# Patient Record
Sex: FEMALE | Race: WHITE | Employment: FULL TIME | URBAN - NONMETROPOLITAN AREA
[De-identification: names, ages, dates, MRNs, and addresses within clinical notes are randomized per-mention and may not be internally consistent; named-entity substitution may affect disease eponyms.]

---

## 2016-12-01 LAB
BUN BLDV-MCNC: NORMAL MG/DL
CALCIUM SERPL-MCNC: NORMAL MG/DL
CHLORIDE BLD-SCNC: NORMAL MMOL/L
CHOLESTEROL, TOTAL: 270 MG/DL
CHOLESTEROL/HDL RATIO: 5.1
CO2: NORMAL MMOL/L
CREAT SERPL-MCNC: NORMAL MG/DL
GFR CALCULATED: NORMAL
GLUCOSE BLD-MCNC: 182 MG/DL
HDLC SERPL-MCNC: 53 MG/DL (ref 35–70)
LDL CHOLESTEROL CALCULATED: 165.2 MG/DL (ref 0–160)
POTASSIUM SERPL-SCNC: NORMAL MMOL/L
SODIUM BLD-SCNC: NORMAL MMOL/L
TRIGL SERPL-MCNC: 259 MG/DL
VLDLC SERPL CALC-MCNC: 52 MG/DL

## 2016-12-07 LAB
AVERAGE GLUCOSE: NORMAL
HBA1C MFR BLD: 7.2 %

## 2017-07-13 ENCOUNTER — OFFICE VISIT (OUTPATIENT)
Dept: FAMILY MEDICINE CLINIC | Age: 52
End: 2017-07-13
Payer: COMMERCIAL

## 2017-07-13 VITALS — WEIGHT: 275 LBS | HEART RATE: 68 BPM | SYSTOLIC BLOOD PRESSURE: 110 MMHG | DIASTOLIC BLOOD PRESSURE: 80 MMHG

## 2017-07-13 DIAGNOSIS — L03.011 PARONYCHIA, RIGHT: ICD-10-CM

## 2017-07-13 DIAGNOSIS — L60.0 INGROWN TOENAIL: Primary | ICD-10-CM

## 2017-07-13 PROCEDURE — 99213 OFFICE O/P EST LOW 20 MIN: CPT | Performed by: FAMILY MEDICINE

## 2017-07-13 RX ORDER — CEPHALEXIN 500 MG/1
1000 CAPSULE ORAL 2 TIMES DAILY
Qty: 28 CAPSULE | Refills: 0 | Status: SHIPPED | OUTPATIENT
Start: 2017-07-13 | End: 2018-02-28 | Stop reason: ALTCHOICE

## 2017-07-13 RX ORDER — LISINOPRIL 20 MG/1
20 TABLET ORAL DAILY
COMMUNITY
End: 2017-09-14 | Stop reason: SDUPTHER

## 2017-09-14 RX ORDER — LISINOPRIL 20 MG/1
20 TABLET ORAL DAILY
Qty: 30 TABLET | Refills: 5 | Status: SHIPPED | OUTPATIENT
Start: 2017-09-14 | End: 2018-03-23 | Stop reason: SDUPTHER

## 2018-02-27 VITALS
HEART RATE: 82 BPM | WEIGHT: 272 LBS | BODY MASS INDEX: 45.32 KG/M2 | SYSTOLIC BLOOD PRESSURE: 132 MMHG | HEIGHT: 65 IN | DIASTOLIC BLOOD PRESSURE: 84 MMHG

## 2018-02-27 DIAGNOSIS — N95.0 POSTMENOPAUSAL BLEEDING: ICD-10-CM

## 2018-02-27 PROBLEM — I10 HYPERTENSION: Status: ACTIVE | Noted: 2018-02-27

## 2018-02-27 PROBLEM — E78.5 HYPERLIPIDEMIA: Status: ACTIVE | Noted: 2018-02-27

## 2018-02-28 ENCOUNTER — OFFICE VISIT (OUTPATIENT)
Dept: FAMILY MEDICINE CLINIC | Age: 53
End: 2018-02-28
Payer: COMMERCIAL

## 2018-02-28 ENCOUNTER — TELEPHONE (OUTPATIENT)
Dept: FAMILY MEDICINE CLINIC | Age: 53
End: 2018-02-28

## 2018-02-28 VITALS
HEIGHT: 64 IN | HEART RATE: 82 BPM | BODY MASS INDEX: 44.05 KG/M2 | WEIGHT: 258 LBS | SYSTOLIC BLOOD PRESSURE: 132 MMHG | DIASTOLIC BLOOD PRESSURE: 82 MMHG

## 2018-02-28 DIAGNOSIS — I10 ESSENTIAL HYPERTENSION: ICD-10-CM

## 2018-02-28 DIAGNOSIS — E11.65 TYPE 2 DIABETES MELLITUS WITH HYPERGLYCEMIA, WITHOUT LONG-TERM CURRENT USE OF INSULIN (HCC): Primary | ICD-10-CM

## 2018-02-28 DIAGNOSIS — R73.01 IMPAIRED FASTING GLUCOSE: ICD-10-CM

## 2018-02-28 LAB — HBA1C MFR BLD: 13.5 %

## 2018-02-28 PROCEDURE — 99215 OFFICE O/P EST HI 40 MIN: CPT | Performed by: FAMILY MEDICINE

## 2018-02-28 PROCEDURE — 83036 HEMOGLOBIN GLYCOSYLATED A1C: CPT | Performed by: FAMILY MEDICINE

## 2018-02-28 RX ORDER — LANCETS 30 GAUGE
EACH MISCELLANEOUS
Qty: 100 EACH | Refills: 3 | Status: SHIPPED | OUTPATIENT
Start: 2018-02-28

## 2018-02-28 ASSESSMENT — ENCOUNTER SYMPTOMS
CONSTIPATION: 0
STRIDOR: 0
WHEEZING: 0
DIARRHEA: 0
COUGH: 0
NAUSEA: 0
ABDOMINAL PAIN: 0
SHORTNESS OF BREATH: 0
ABDOMINAL DISTENTION: 0

## 2018-02-28 ASSESSMENT — PATIENT HEALTH QUESTIONNAIRE - PHQ9
SUM OF ALL RESPONSES TO PHQ QUESTIONS 1-9: 0
SUM OF ALL RESPONSES TO PHQ9 QUESTIONS 1 & 2: 0
2. FEELING DOWN, DEPRESSED OR HOPELESS: 0
1. LITTLE INTEREST OR PLEASURE IN DOING THINGS: 0

## 2018-02-28 NOTE — PATIENT INSTRUCTIONS
Start with one tab daily and then increase to 2 daily of the metformin XR in 2 weeks. Patient Education        Learning About Diabetes Food Guidelines  Your Care Instructions    Meal planning is important to manage diabetes. It helps keep your blood sugar at a target level (which you set with your doctor). You don't have to eat special foods. You can eat what your family eats, including sweets once in a while. But you do have to pay attention to how often you eat and how much you eat of certain foods. You may want to work with a dietitian or a certified diabetes educator (CDE) to help you plan meals and snacks. A dietitian or CDE can also help you lose weight if that is one of your goals. What should you know about eating carbs? Managing the amount of carbohydrate (carbs) you eat is an important part of healthy meals when you have diabetes. Carbohydrate is found in many foods. · Learn which foods have carbs. And learn the amounts of carbs in different foods. ¨ Bread, cereal, pasta, and rice have about 15 grams of carbs in a serving. A serving is 1 slice of bread (1 ounce), ½ cup of cooked cereal, or 1/3 cup of cooked pasta or rice. ¨ Fruits have 15 grams of carbs in a serving. A serving is 1 small fresh fruit, such as an apple or orange; ½ of a banana; ½ cup of cooked or canned fruit; ½ cup of fruit juice; 1 cup of melon or raspberries; or 2 tablespoons of dried fruit. ¨ Milk and no-sugar-added yogurt have 15 grams of carbs in a serving. A serving is 1 cup of milk or 2/3 cup of no-sugar-added yogurt. ¨ Starchy vegetables have 15 grams of carbs in a serving. A serving is ½ cup of mashed potatoes or sweet potato; 1 cup winter squash; ½ of a small baked potato; ½ cup of cooked beans; or ½ cup cooked corn or green peas. · Learn how much carbs to eat each day and at each meal. A dietitian or CDE can teach you how to keep track of the amount of carbs you eat. This is called carbohydrate counting.   · If you are answer your questions about meal planning. This health professional can also help you reach a healthy weight if that is one of your goals. What plan is right for you? Your dietitian or diabetes educator may suggest that you start with the plate format or carbohydrate counting. The plate format  The plate format is a simple way to help you manage how you eat. You plan meals by learning how much space each food should take on a plate. Using the plate format helps you spread carbohydrate throughout the day. It can make it easier to keep your blood sugar level within your target range. It also helps you see if you're eating healthy portion sizes. To use the plate format, you put non-starchy vegetables on half your plate. Add meat or meat substitutes on one-quarter of the plate. Put a grain or starchy vegetable (such as brown rice or a potato) on the final quarter of the plate. You can add a small piece of fruit and some low-fat or fat-free milk or yogurt, depending on your carbohydrate goal for each meal.  Here are some tips for using the plate format:  · Make sure that you are not using an oversized plate. A 9-inch plate is best. Many restaurants use larger plates. · Get used to using the plate format at home. Then you can use it when you eat out. · Write down your questions about using the plate format. Talk to your doctor, a dietitian, or a diabetes educator about your concerns. Carbohydrate counting  With carbohydrate counting, you plan meals based on the amount of carbohydrate in each food. Carbohydrate raises blood sugar higher and more quickly than any other nutrient. It is found in desserts, breads and cereals, and fruit. It's also found in starchy vegetables such as potatoes and corn, grains such as rice and pasta, and milk and yogurt. Spreading carbohydrate throughout the day helps keep your blood sugar levels within your target range.   Your daily amount depends on several things, including your weight,

## 2018-02-28 NOTE — TELEPHONE ENCOUNTER
I gave her a paper prescription/ order in her hand to take to the pharmacy as I could not get this to electronically send as it is in as DME. If the pharmacy has a specific brand name her insurance will cover we can try again to electronically send in.

## 2018-02-28 NOTE — PROGRESS NOTES
[address] [address] [address]  Dept: [phone]  Dept Fax: [phone]    Jacinto Valero is a 47 y.o. female who presents today for her medical conditions/complaints as noted below. Jacinto Valero is c/o of Blurred Vision (soes wear glasses but the past few weeks have been more blurry) and Fatigue (worn out and tired, ongoing for about 2-3 weeks now)      HPI:     HPI  Has been basically feeling worn down and vision is getting worse. Not sure if needed a check up, has been a bit. Also really thirsty and urinating a lot. Is getting a night to urinate, only once. Gums have bleeding for the last few weeks when she brushes her teeth and had a good check up then also. Occasional hot sweats but not changed. Occasionally slightly dizzy. Does not last long, better if she sits down and gets something to eat. No palpitations. No chest pain, no SOB, no palpitations. Has had weight loss and has not been trying but does not weigh herself    [place] out a lot, does not usually cook. Does not drink pop or sugared beverages, admits eats little fruits and beverages and likes potatoes.     BP Readings from Last 3 Encounters:   02/28/18 132/82   01/05/17 132/84   07/13/17 110/80          (goal 120/80)    Wt Readings from Last 3 Encounters:   02/28/18 258 lb (117 kg)   01/05/17 272 lb (123.4 kg)   07/13/17 275 lb (124.7 kg)        Past Medical History:   Diagnosis Date    Hyperlipidemia     Hypertension       Past Surgical History:   Procedure Laterality Date    ANKLE SURGERY      scope    CERVICAL POLYP REMOVAL  01/25/2017    CHOLECYSTECTOMY      DILATION AND CURETTAGE OF UTERUS  01/2017    due to menorrhagia    TUBAL LIGATION         Family History   Problem Relation Age of Onset    Arthritis Mother     Cancer Mother      endometrial       Social History   Substance Use Topics    Smoking status: Never Smoker    Smokeless tobacco: Never Used   [facility]

## 2018-03-02 ENCOUNTER — TELEPHONE (OUTPATIENT)
Dept: FAMILY MEDICINE CLINIC | Age: 53
End: 2018-03-02

## 2018-03-05 ENCOUNTER — TELEPHONE (OUTPATIENT)
Dept: FAMILY MEDICINE CLINIC | Age: 53
End: 2018-03-05

## 2018-03-06 ENCOUNTER — TELEPHONE (OUTPATIENT)
Dept: FAMILY MEDICINE CLINIC | Age: 53
End: 2018-03-06

## 2018-03-06 NOTE — TELEPHONE ENCOUNTER
[address] Transitions Initial Follow Up Call    Call within 2 business days of discharge: Yes    Patient: Germaine Overton Patient : 1965   MRN: [id number]  Reason for Admission: elevated blood sugars Discharge Date:   RARS: Gerory No Data Recorded     Spoke with: Shlomo Ho, stated that she is not doing bad but the medication is upsetting her stomach (janumet), is aware of coupons for this ect.     Facility: [facility]    Non-face-to-face services provided:  Scheduled appointment with PCP-Dr. Octavio Mendez    Follow Up  Future Appointments  Date Time Provider Paulino Olivares   3/14/2018 3:00 PM Micky Tello MD [facility]   2018 3:15 PM Micky Tello MD [address], LPN

## 2018-03-09 ENCOUNTER — TELEPHONE (OUTPATIENT)
Dept: FAMILY MEDICINE CLINIC | Age: 53
End: 2018-03-09

## 2018-03-09 NOTE — TELEPHONE ENCOUNTER
What does not feeling well mean? Is she taking the janumet 2 pills daily? Make sure trying to stay active and trying to watch diet and has the dietician follow up schedled. THis will take time to improve also.

## 2018-03-09 NOTE — TELEPHONE ENCOUNTER
Called and stated that her Blood sugars ae still running in the 300's,still not feeling welll and was wondering if there was anything else she should be doing? ?

## 2018-03-14 ENCOUNTER — OFFICE VISIT (OUTPATIENT)
Dept: FAMILY MEDICINE CLINIC | Age: 53
End: 2018-03-14
Payer: COMMERCIAL

## 2018-03-14 VITALS
SYSTOLIC BLOOD PRESSURE: 132 MMHG | WEIGHT: 256 LBS | DIASTOLIC BLOOD PRESSURE: 82 MMHG | HEART RATE: 86 BPM | BODY MASS INDEX: 43.94 KG/M2

## 2018-03-14 DIAGNOSIS — R53.83 FATIGUE, UNSPECIFIED TYPE: ICD-10-CM

## 2018-03-14 DIAGNOSIS — E11.65 TYPE 2 DIABETES MELLITUS WITH HYPERGLYCEMIA, WITHOUT LONG-TERM CURRENT USE OF INSULIN (HCC): ICD-10-CM

## 2018-03-14 PROCEDURE — 99213 OFFICE O/P EST LOW 20 MIN: CPT | Performed by: FAMILY MEDICINE

## 2018-03-14 PROCEDURE — 1111F DSCHRG MED/CURRENT MED MERGE: CPT | Performed by: FAMILY MEDICINE

## 2018-03-14 RX ORDER — GLIMEPIRIDE 4 MG/1
4 TABLET ORAL EVERY MORNING
Qty: 30 TABLET | Refills: 3 | Status: SHIPPED | OUTPATIENT
Start: 2018-03-14 | End: 2018-07-23 | Stop reason: DRUGHIGH

## 2018-03-14 NOTE — PATIENT INSTRUCTIONS
first main meal of the day. Follow your doctor's instructions. Take glimepiride with a full glass of water. Your blood sugar will need to be checked often, and you may need other blood tests at your doctor's office. Low blood sugar (hypoglycemia) can happen to everyone who has diabetes. Symptoms include headache, hunger, sweating, pale skin, irritability, dizziness, feeling shaky, or trouble concentrating. Always keep a source of sugar with you in case you have low blood sugar. Sugar sources include fruit juice, hard candy, crackers, raisins, and non-diet soda. Be sure your family and close friends know how to help you in an emergency. If you have severe hypoglycemia and cannot eat or drink, use a glucagon injection. Your doctor can prescribe a glucagon emergency injection kit and tell you how to use it. Check your blood sugar carefully during times of stress, travel, illness, surgery or medical emergency, vigorous exercise, or if you drink alcohol or skip meals. These things can affect your glucose levels and your dose needs may also change. Do not change your medication dose or schedule without your doctor's advice. Glimepiride is only part of a treatment program that may also include diet, exercise, weight control, blood sugar testing, and special medical care. Follow your doctor's instructions very closely. Store at room temperature away from moisture and heat. What happens if I miss a dose? Take the missed dose as soon as you remember. Skip the missed dose if it is almost time for your next scheduled dose. Do not  take extra medicine to make up the missed dose. What happens if I overdose? Seek emergency medical attention or call the Poison Help line at 1-979.133.5771. A glimepiride overdose can cause life-threatening hypoglycemia.   Symptoms of severe hypoglycemia include extreme weakness, confusion, tremors, sweating, fast heart rate, trouble speaking, nausea, vomiting, rapid breathing, fainting, and seizure (convulsions). What should I avoid while taking glimepiride? If you also take colesevelam, avoid taking it within 4 hours after you take glimepiride. Avoid drinking alcohol. It lowers blood sugar and may interfere with your diabetes treatment. Avoid exposure to sunlight or tanning beds. Glimepiride can make you sunburn more easily. Wear protective clothing and use sunscreen (SPF 30 or higher) when you are outdoors. What are the possible side effects of glimepiride? Get emergency medical help if you have signs of an allergic reaction: hives; difficulty breathing; swelling of your face, lips, tongue, or throat. Call your doctor at once if you have:  · pale or yellowed skin, dark colored urine, fever, confusion or weakness; or  · severe skin reaction --fever, sore throat, swelling in your face or tongue, burning in your eyes, skin pain, followed by a red or purple skin rash that spreads (especially in the face or upper body) and causes blistering and peeling. Common side effects may include:  · headache;  · dizziness, weakness;  · nausea; or  · flu symptoms. This is not a complete list of side effects and others may occur. Call your doctor for medical advice about side effects. You may report side effects to FDA at 4-918-FDA-4589. What other drugs will affect glimepiride? You may be more likely to have hypoglycemia (low blood sugar) if you take glimepiride with other drugs that can lower blood sugar, such as:  · exenatide (Byetta);  · probenecid (Benemid);  · aspirin or other salicylates (including Pepto Bismol);  · a blood thinner (warfarin, Coumadin, Jantoven);  · sulfa drugs (Bactrim, SMZ-TMP, and others);  · a monoamine oxidase inhibitor (MAOI); or  · insulin or other oral diabetes medications. This list is not complete, and many other medicines can increase or decrease the effects of glimepiride on lowering your blood sugar.  This includes prescription and over-the-counter medicines, vitamins, and herbal products. Tell your doctor about all medicines you use, and those you start or stop using during your treatment with glimepiride. Not all possible interactions are listed in this medication guide. Where can I get more information? Your pharmacist can provide more information about glimepiride. Remember, keep this and all other medicines out of the reach of children, never share your medicines with others, and use this medication only for the indication prescribed. Every effort has been made to ensure that the information provided by [address] is accurate, up-to-date, and complete, but no guarantee is made to that effect. Drug information contained herein may be time sensitive. [facility] information has been compiled for use by healthcare practitioners and consumers in the [place] and therefore [facility] does not warrant that uses outside of the [place] are appropriate, unless specifically indicated otherwise. [facility]'s drug information does not endorse drugs, diagnose patients or recommend therapy. [facility] drug information is an informational resource designed to assist licensed healthcare practitioners in caring for their patients and/or to serve consumers viewing this service as a supplement to, and not a substitute for, the expertise, skill, knowledge and judgment of healthcare practitioners. The absence of a warning for a given drug or drug combination in no way should be construed to indicate that the drug or drug combination is safe, effective or appropriate for any given patient. [facility] does not assume any responsibility for any aspect of healthcare administered with the aid of information [facility] provides. The information contained herein is not intended to cover all possible uses, directions, precautions, warnings, drug interactions, allergic reactions, or adverse effects.  If you have questions about the drugs you are taking, check with your doctor, nurse or pharmacist.  Copyright 9803-1395 Christian Purdy. Version: 10.03. Revision date: 12/2/2015. Care instructions adapted under license by [facility] ([facility]). If you have questions about a medical condition or this instruction, always ask your healthcare professional. [address] any warranty or liability for your use of this information.

## 2018-03-14 NOTE — PROGRESS NOTES
lightheaded even before starting the medication     History of Present illness - Follow up of Hospital diagnosis(es):admitted to the hospital for    Non face to face  following discharge, date last encounter closed (first attempt may have been earlier): 3/6/2018  2:40 PM 3/6/2018  2:40 PM    Call initiated 2 business days of discharge: Yes     Interval history/Current status: Blood sugars have still been in the 200-low 300 range at times    Feels alittle \"queasy\" at times. Worse after she takes the pill. A weird feeling. No diarrhea. No gas. Feels like this is getting better than when she started it. Feels really full. Physical Exam:  Physical Exam   Constitutional: She is oriented to person, place, and time. She appears well-developed and well-nourished. HENT:   Head: Normocephalic and atraumatic. Eyes: Conjunctivae and EOM are normal.   Neck: Normal range of motion. Neck supple. No JVD present. No thyromegaly present. Cardiovascular: Normal rate, regular rhythm and intact distal pulses. Exam reveals no gallop and no friction rub. No murmur heard. Pulmonary/Chest: Effort normal and breath sounds normal. No respiratory distress. Abdominal: Soft. Musculoskeletal: She exhibits no edema. Lymphadenopathy:     She has no cervical adenopathy. Neurological: She is alert and oriented to person, place, and time. Skin: Skin is warm. Nursing note and vitals reviewed. Assessment/Plan:  Laine Ashley was seen today for follow-up from hospital.    Diagnoses and all orders for this visit:    Uncontrolled type 2 diabetes mellitus without complication, without long-term current use of insulin ([address]  -     SD DISCHARGE MEDS RECONCILED W/ CURRENT OUTPATIENT MED LIST  -     glimepiride (AMARYL) 4 MG tablet; Take 1 tablet by mouth every morning  -     Discontinue: SITagliptin-metFORMIN (JANUMET XR)  MG TB24 per extended release tablet;  Take 2 tablets by mouth daily  -     Microalbumin, Ur; Future  -     Lipid Panel; Future  Discussed continuing on the metformin and will let us know if she does not improve with the GI tolerance- believes it is improving at this point. Will continue with the diet and exercise as well. Type 2 diabetes mellitus with hyperglycemia, without long-term current use of insulin (HCC)    Fatigue, unspecified type  -     TSH without Reflex;  Future        Medical Decision Making: low complexity

## 2018-03-15 ENCOUNTER — TELEPHONE (OUTPATIENT)
Dept: FAMILY MEDICINE CLINIC | Age: 53
End: 2018-03-15

## 2018-03-15 DIAGNOSIS — E11.9 TYPE 2 DIABETES MELLITUS WITHOUT COMPLICATION, WITHOUT LONG-TERM CURRENT USE OF INSULIN (HCC): Primary | ICD-10-CM

## 2018-03-15 RX ORDER — METFORMIN HYDROCHLORIDE 500 MG/1
1000 TABLET, EXTENDED RELEASE ORAL
Qty: 60 TABLET | Refills: 3 | Status: SHIPPED | OUTPATIENT
Start: 2018-03-15 | End: 2018-07-14 | Stop reason: SDUPTHER

## 2018-03-15 NOTE — TELEPHONE ENCOUNTER
Stop the janumet. Start the Metformin ER which I sent to [facility]. Watch the diet closely. Will see how she is doing with the sugars at the next appt and go from there.

## 2018-03-23 RX ORDER — LISINOPRIL 20 MG/1
TABLET ORAL
Qty: 30 TABLET | Refills: 5 | Status: SHIPPED | OUTPATIENT
Start: 2018-03-23 | End: 2018-09-24 | Stop reason: SDUPTHER

## 2018-04-16 LAB
CHOLESTEROL/HDL RATIO: 4.9 RATIO
CHOLESTEROL: 249 MG/DL
CREATININE, RANDOM: 136.7 MG/DL
HDL, DIRECT: 51 MG/DL
LDL CHOLESTEROL CALCULATED: 164.4 MG/DL
MICROALBUMIN UR-MCNC: 1.1 MG/DL
MICROALBUMIN/CREAT UR-RTO: 8 MCG/MG CR
TRIGL SERPL-MCNC: 168 MG/DL
TSH SERPL DL<=0.05 MIU/L-ACNC: 1.79 MIU/ML
VLDLC SERPL CALC-MCNC: 34 MG/DL

## 2018-04-23 ENCOUNTER — OFFICE VISIT (OUTPATIENT)
Dept: FAMILY MEDICINE CLINIC | Age: 53
End: 2018-04-23
Payer: COMMERCIAL

## 2018-04-23 VITALS
DIASTOLIC BLOOD PRESSURE: 80 MMHG | SYSTOLIC BLOOD PRESSURE: 122 MMHG | HEART RATE: 64 BPM | WEIGHT: 248 LBS | BODY MASS INDEX: 42.57 KG/M2

## 2018-04-23 DIAGNOSIS — I10 ESSENTIAL HYPERTENSION: Primary | ICD-10-CM

## 2018-04-23 DIAGNOSIS — E78.2 MIXED HYPERLIPIDEMIA: ICD-10-CM

## 2018-04-23 PROCEDURE — 99214 OFFICE O/P EST MOD 30 MIN: CPT | Performed by: FAMILY MEDICINE

## 2018-04-24 ASSESSMENT — ENCOUNTER SYMPTOMS
BLURRED VISION: 1
VISUAL CHANGE: 0

## 2018-06-06 ENCOUNTER — OFFICE VISIT (OUTPATIENT)
Dept: FAMILY MEDICINE CLINIC | Age: 53
End: 2018-06-06
Payer: COMMERCIAL

## 2018-06-06 VITALS
DIASTOLIC BLOOD PRESSURE: 70 MMHG | HEART RATE: 72 BPM | BODY MASS INDEX: 42.4 KG/M2 | SYSTOLIC BLOOD PRESSURE: 110 MMHG | WEIGHT: 247 LBS

## 2018-06-06 DIAGNOSIS — G62.9 NEUROPATHY: Primary | ICD-10-CM

## 2018-06-06 PROCEDURE — 99213 OFFICE O/P EST LOW 20 MIN: CPT | Performed by: NURSE PRACTITIONER

## 2018-06-06 RX ORDER — GABAPENTIN 100 MG/1
100 CAPSULE ORAL 3 TIMES DAILY
Qty: 90 CAPSULE | Refills: 0 | Status: SHIPPED | OUTPATIENT
Start: 2018-06-06 | End: 2019-02-05 | Stop reason: ALTCHOICE

## 2018-07-14 DIAGNOSIS — E11.9 TYPE 2 DIABETES MELLITUS WITHOUT COMPLICATION, WITHOUT LONG-TERM CURRENT USE OF INSULIN (HCC): ICD-10-CM

## 2018-07-14 RX ORDER — METFORMIN HYDROCHLORIDE 500 MG/1
TABLET, EXTENDED RELEASE ORAL
Qty: 60 TABLET | Refills: 3 | Status: SHIPPED | OUTPATIENT
Start: 2018-07-14 | End: 2018-11-12 | Stop reason: SDUPTHER

## 2018-07-23 ENCOUNTER — OFFICE VISIT (OUTPATIENT)
Dept: FAMILY MEDICINE CLINIC | Age: 53
End: 2018-07-23
Payer: COMMERCIAL

## 2018-07-23 VITALS
DIASTOLIC BLOOD PRESSURE: 82 MMHG | SYSTOLIC BLOOD PRESSURE: 124 MMHG | OXYGEN SATURATION: 98 % | BODY MASS INDEX: 42.78 KG/M2 | HEART RATE: 107 BPM | WEIGHT: 249.25 LBS

## 2018-07-23 DIAGNOSIS — I10 ESSENTIAL HYPERTENSION: Primary | ICD-10-CM

## 2018-07-23 DIAGNOSIS — Z12.39 SCREENING BREAST EXAMINATION: ICD-10-CM

## 2018-07-23 DIAGNOSIS — E11.9 TYPE 2 DIABETES MELLITUS WITHOUT COMPLICATION, WITHOUT LONG-TERM CURRENT USE OF INSULIN (HCC): ICD-10-CM

## 2018-07-23 DIAGNOSIS — F41.9 ANXIETY: ICD-10-CM

## 2018-07-23 DIAGNOSIS — E78.2 MIXED HYPERLIPIDEMIA: ICD-10-CM

## 2018-07-23 LAB — HBA1C MFR BLD: 6.4 %

## 2018-07-23 PROCEDURE — 99214 OFFICE O/P EST MOD 30 MIN: CPT | Performed by: FAMILY MEDICINE

## 2018-07-23 PROCEDURE — 83036 HEMOGLOBIN GLYCOSYLATED A1C: CPT | Performed by: FAMILY MEDICINE

## 2018-07-23 RX ORDER — FLUOXETINE HYDROCHLORIDE 20 MG/1
20 CAPSULE ORAL DAILY
Qty: 30 CAPSULE | Refills: 3 | Status: SHIPPED | OUTPATIENT
Start: 2018-07-23 | End: 2018-11-12 | Stop reason: SDUPTHER

## 2018-07-23 RX ORDER — GLIMEPIRIDE 2 MG/1
2 TABLET ORAL
Qty: 30 TABLET | Refills: 5 | Status: SHIPPED | OUTPATIENT
Start: 2018-07-23 | End: 2018-10-19 | Stop reason: SDUPTHER

## 2018-07-23 ASSESSMENT — ENCOUNTER SYMPTOMS
SHORTNESS OF BREATH: 0
ABDOMINAL PAIN: 0
COUGH: 0
DIARRHEA: 0
CONSTIPATION: 0
WHEEZING: 0

## 2018-07-29 PROBLEM — E11.9 TYPE 2 DIABETES MELLITUS WITHOUT COMPLICATION, WITHOUT LONG-TERM CURRENT USE OF INSULIN (HCC): Status: ACTIVE | Noted: 2018-07-29

## 2018-09-24 RX ORDER — LISINOPRIL 20 MG/1
TABLET ORAL
Qty: 30 TABLET | Refills: 5 | Status: SHIPPED | OUTPATIENT
Start: 2018-09-24 | End: 2019-03-27 | Stop reason: SDUPTHER

## 2018-10-18 ENCOUNTER — TELEPHONE (OUTPATIENT)
Dept: FAMILY MEDICINE CLINIC | Age: 53
End: 2018-10-18

## 2018-10-19 DIAGNOSIS — E11.9 TYPE 2 DIABETES MELLITUS WITHOUT COMPLICATION, WITHOUT LONG-TERM CURRENT USE OF INSULIN (HCC): ICD-10-CM

## 2018-10-19 RX ORDER — GLIMEPIRIDE 2 MG/1
1 TABLET ORAL
Qty: 30 TABLET | Refills: 5
Start: 2018-10-19 | End: 2018-10-25 | Stop reason: ALTCHOICE

## 2018-10-25 ENCOUNTER — OFFICE VISIT (OUTPATIENT)
Dept: FAMILY MEDICINE CLINIC | Age: 53
End: 2018-10-25
Payer: COMMERCIAL

## 2018-10-25 VITALS
BODY MASS INDEX: 43.43 KG/M2 | OXYGEN SATURATION: 97 % | HEART RATE: 75 BPM | TEMPERATURE: 96.1 F | SYSTOLIC BLOOD PRESSURE: 110 MMHG | DIASTOLIC BLOOD PRESSURE: 78 MMHG | WEIGHT: 253 LBS

## 2018-10-25 DIAGNOSIS — E16.2 HYPOGLYCEMIA: ICD-10-CM

## 2018-10-25 DIAGNOSIS — Z23 NEED FOR PROPHYLACTIC VACCINATION AND INOCULATION AGAINST INFLUENZA: ICD-10-CM

## 2018-10-25 DIAGNOSIS — E11.9 TYPE 2 DIABETES MELLITUS WITHOUT COMPLICATION, WITHOUT LONG-TERM CURRENT USE OF INSULIN (HCC): Primary | ICD-10-CM

## 2018-10-25 LAB — HBA1C MFR BLD: 6.2 %

## 2018-10-25 PROCEDURE — 90686 IIV4 VACC NO PRSV 0.5 ML IM: CPT | Performed by: FAMILY MEDICINE

## 2018-10-25 PROCEDURE — 99213 OFFICE O/P EST LOW 20 MIN: CPT | Performed by: FAMILY MEDICINE

## 2018-10-25 PROCEDURE — 83036 HEMOGLOBIN GLYCOSYLATED A1C: CPT | Performed by: FAMILY MEDICINE

## 2018-10-25 PROCEDURE — 90471 IMMUNIZATION ADMIN: CPT | Performed by: FAMILY MEDICINE

## 2018-10-25 ASSESSMENT — ENCOUNTER SYMPTOMS
BLOOD IN STOOL: 0
DIARRHEA: 0
SHORTNESS OF BREATH: 0
CONSTIPATION: 0
ABDOMINAL PAIN: 0

## 2018-10-25 NOTE — PROGRESS NOTES
patientinstructions. Discussed use, benefit, and side effects of prescribed medications. All patient questions answered. Pt voiced understanding. Reviewed health maintenance. Instructed to continue current medications, diet andexercise. Patient agreed with treatment plan. Follow up as directed.      Electronically signed by Jarad Platt MD on 10/25/2018

## 2018-11-12 ENCOUNTER — OFFICE VISIT (OUTPATIENT)
Dept: FAMILY MEDICINE CLINIC | Age: 53
End: 2018-11-12
Payer: COMMERCIAL

## 2018-11-12 VITALS
DIASTOLIC BLOOD PRESSURE: 80 MMHG | WEIGHT: 254 LBS | HEART RATE: 84 BPM | SYSTOLIC BLOOD PRESSURE: 130 MMHG | BODY MASS INDEX: 43.6 KG/M2

## 2018-11-12 DIAGNOSIS — E11.9 TYPE 2 DIABETES MELLITUS WITHOUT COMPLICATION, WITHOUT LONG-TERM CURRENT USE OF INSULIN (HCC): ICD-10-CM

## 2018-11-12 DIAGNOSIS — Z11.59 NEED FOR HEPATITIS C SCREENING TEST: ICD-10-CM

## 2018-11-12 DIAGNOSIS — I10 ESSENTIAL HYPERTENSION: ICD-10-CM

## 2018-11-12 DIAGNOSIS — R41.840 LACK OF CONCENTRATION: ICD-10-CM

## 2018-11-12 DIAGNOSIS — Z12.11 SCREEN FOR COLON CANCER: ICD-10-CM

## 2018-11-12 DIAGNOSIS — F41.9 ANXIETY: ICD-10-CM

## 2018-11-12 DIAGNOSIS — Z23 NEED FOR PROPHYLACTIC VACCINATION AND INOCULATION AGAINST VARICELLA: ICD-10-CM

## 2018-11-12 DIAGNOSIS — Z00.00 WELL ADULT EXAM: Primary | ICD-10-CM

## 2018-11-12 DIAGNOSIS — Z12.31 ENCOUNTER FOR SCREENING MAMMOGRAM FOR BREAST CANCER: ICD-10-CM

## 2018-11-12 DIAGNOSIS — E78.2 MIXED HYPERLIPIDEMIA: ICD-10-CM

## 2018-11-12 DIAGNOSIS — Z23 NEED FOR PROPHYLACTIC VACCINATION AGAINST STREPTOCOCCUS PNEUMONIAE (PNEUMOCOCCUS): ICD-10-CM

## 2018-11-12 PROCEDURE — 90732 PPSV23 VACC 2 YRS+ SUBQ/IM: CPT | Performed by: FAMILY MEDICINE

## 2018-11-12 PROCEDURE — 99396 PREV VISIT EST AGE 40-64: CPT | Performed by: FAMILY MEDICINE

## 2018-11-12 PROCEDURE — 90471 IMMUNIZATION ADMIN: CPT | Performed by: FAMILY MEDICINE

## 2018-11-12 ASSESSMENT — ENCOUNTER SYMPTOMS
ABDOMINAL PAIN: 0
COLOR CHANGE: 0

## 2018-11-12 NOTE — PROGRESS NOTES
Review of Systems   Constitutional: Negative for fatigue. Gastrointestinal: Negative for abdominal pain. Genitourinary: Negative for frequency, vaginal bleeding and vaginal discharge. Skin: Negative for color change. Psychiatric/Behavioral: Negative for sleep disturbance.

## 2018-11-13 RX ORDER — FLUOXETINE HYDROCHLORIDE 20 MG/1
CAPSULE ORAL
Qty: 30 CAPSULE | Refills: 3 | Status: SHIPPED | OUTPATIENT
Start: 2018-11-13 | End: 2019-03-11 | Stop reason: SDUPTHER

## 2018-11-13 RX ORDER — METFORMIN HYDROCHLORIDE 500 MG/1
TABLET, EXTENDED RELEASE ORAL
Qty: 60 TABLET | Refills: 3 | Status: SHIPPED | OUTPATIENT
Start: 2018-11-13 | End: 2019-03-11 | Stop reason: SDUPTHER

## 2019-01-09 ENCOUNTER — TELEPHONE (OUTPATIENT)
Dept: FAMILY MEDICINE CLINIC | Age: 54
End: 2019-01-09

## 2019-01-09 DIAGNOSIS — E11.9 TYPE 2 DIABETES MELLITUS WITHOUT COMPLICATION, WITHOUT LONG-TERM CURRENT USE OF INSULIN (HCC): Primary | ICD-10-CM

## 2019-02-05 ENCOUNTER — OFFICE VISIT (OUTPATIENT)
Dept: FAMILY MEDICINE CLINIC | Age: 54
End: 2019-02-05
Payer: COMMERCIAL

## 2019-02-05 ENCOUNTER — EVALUATION (OUTPATIENT)
Dept: PHYSICAL THERAPY | Facility: CLINIC | Age: 54
End: 2019-02-05
Payer: COMMERCIAL

## 2019-02-05 VITALS
DIASTOLIC BLOOD PRESSURE: 64 MMHG | HEART RATE: 68 BPM | SYSTOLIC BLOOD PRESSURE: 122 MMHG | BODY MASS INDEX: 44.29 KG/M2 | WEIGHT: 258 LBS

## 2019-02-05 DIAGNOSIS — E66.01 CLASS 3 SEVERE OBESITY DUE TO EXCESS CALORIES WITH SERIOUS COMORBIDITY AND BODY MASS INDEX (BMI) OF 40.0 TO 44.9 IN ADULT (HCC): ICD-10-CM

## 2019-02-05 DIAGNOSIS — Z98.890 HISTORY OF ARTHROSCOPIC PROCEDURE ON SHOULDER: ICD-10-CM

## 2019-02-05 DIAGNOSIS — I10 ESSENTIAL HYPERTENSION: ICD-10-CM

## 2019-02-05 DIAGNOSIS — E11.9 TYPE 2 DIABETES MELLITUS WITHOUT COMPLICATION, WITHOUT LONG-TERM CURRENT USE OF INSULIN (HCC): Primary | ICD-10-CM

## 2019-02-05 DIAGNOSIS — E78.2 MIXED HYPERLIPIDEMIA: ICD-10-CM

## 2019-02-05 DIAGNOSIS — M12.812 ROTATOR CUFF ARTHROPATHY OF LEFT SHOULDER: ICD-10-CM

## 2019-02-05 DIAGNOSIS — Z71.89 DIABETES EDUCATION, ENCOUNTER FOR: ICD-10-CM

## 2019-02-05 DIAGNOSIS — M25.512 ACUTE PAIN OF LEFT SHOULDER: ICD-10-CM

## 2019-02-05 DIAGNOSIS — Z51.89 AFTERCARE: Primary | ICD-10-CM

## 2019-02-05 LAB — HBA1C MFR BLD: 7.4 %

## 2019-02-05 PROCEDURE — 97162 PT EVAL MOD COMPLEX 30 MIN: CPT | Performed by: PHYSICAL THERAPIST

## 2019-02-05 PROCEDURE — 95251 CONT GLUC MNTR ANALYSIS I&R: CPT | Performed by: NURSE PRACTITIONER

## 2019-02-05 PROCEDURE — 97140 MANUAL THERAPY 1/> REGIONS: CPT | Performed by: PHYSICAL THERAPIST

## 2019-02-05 PROCEDURE — 99205 OFFICE O/P NEW HI 60 MIN: CPT | Performed by: NURSE PRACTITIONER

## 2019-02-05 PROCEDURE — 83036 HEMOGLOBIN GLYCOSYLATED A1C: CPT | Performed by: NURSE PRACTITIONER

## 2019-02-05 ASSESSMENT — ENCOUNTER SYMPTOMS
BLURRED VISION: 0
ABDOMINAL PAIN: 0
RESPIRATORY NEGATIVE: 1
SHORTNESS OF BREATH: 0
VISUAL CHANGE: 0
DIARRHEA: 0

## 2019-02-05 NOTE — LETTER
2019    Jennie Patel, [address] [place] [address] Agustin C[place]eda [address]    Patient: Abhishek Diamond   YOB: 1965   Date of Visit: 2019     Encounter Diagnosis     ICD-10-CM    1  Aftercare Z51 89    2  Acute pain of left shoulder M25 512    3  History of arthroscopic procedure on shoulder Z98 890    4  Rotator cuff arthropathy of left shoulder M12 812        Dear Dr Meenakshi Way:    Please review the attached Plan of Care from [facility] recent visit  Please verify that you agree therapy should continue by signing the attached document and sending it back to our office  If you have any questions or concerns, please don't hesitate to call  Sincerely,    Sharonda Grajeda PT      Referring Provider:      I certify that I have read the below Plan of Care and certify the need for these services furnished under this plan of treatment while under my care  Jennie Patel MD  [address] [place] [address] Agustin Castañeda [address]: [phone]          PT Evaluation     Today's date: 2019  Patient name: Abhishek Diamond  : 1965  MRN: [id number]  Referring provider: Kyra Sterling MD  Dx:   Encounter Diagnosis     ICD-10-CM    1  Aftercare Z51 89    2  Acute pain of left shoulder M25 512    3  History of arthroscopic procedure on shoulder Z98 890    4  Rotator cuff arthropathy of left shoulder M12 812      Assessment  Assessment details: Left shoulder arthroscopic surgery rehab  Impairments: abnormal or restricted ROM, activity intolerance, impaired physical strength and pain with function  Understanding of Dx/Px/POC: excellent  Goals  STG 2-4 weeks:   Increase UE strength by 3-6 lbs  Decrease pain by 1-2 levels on 1-10 pain scale  Increase UE PROM by 10-15 Degrees in all planes  Reduce C/O pain with lying on either side  Initiate HEP  LTG 6-8 weeks:   Increase UE strength 10-20 lbs  Demonstrate UE AROM WFL in all planes     Decrease pain to <2  Improve strength by 10-20 lbs  Return to lying on their right or left side with minimal difficulty  Improve sleep status limitations to none  D/C with HEP  Plan  Patient would benefit from: skilled physical therapy  Planned modality interventions: ultrasound and unattended electrical stimulation  Planned therapy interventions: joint mobilization, manual therapy, patient education, self care, strengthening, stretching, therapeutic activities, therapeutic exercise, therapeutic training, flexibility and coordination  Frequency: 3x week  Duration in weeks: 6  Treatment plan discussed with: patient      Subjective Evaluation    Pain  Quality: discomfort, pulling, sharp, radiating, tight, throbbing and knife-like  Relieving factors: medications, ice, change in position, relaxation, rest and support  Aggravating factors: overhead activity, sitting, keyboarding and lifting  Progression: improved    Treatments  Current treatment: physical therapy  Patient Goals  Patient goals for therapy: decreased pain, increased motion, return to work, return to [employer] activities, independence with ADLs/IADLs and increased strength        Objective    Date of onset:  12/15/2018    Date of Surgery:  01/31/2019    History of Present Episode: 2/5/2019  Shana Joyner states she was pulling on the covers in bed and went to adjust the covers  She pulled hard and slipped  She started to roll out of bed unto the floor  She put her left hand out quickly and stopped herself from falling  She felt immediate pain in her left shoulder  She went for surgery the end of January  Past Medical History:    2/5/2019  Shana Joyner reports some allergies / general mold  Hypertension  Hypothyroidism  Previous Level of Functional Ability:  2/5/2019  Shana Joyner states no limitations or issues  No pain levels  Inspection / Palpation:  Shoulder:  2/5/2019  Mesomorphic body type  No signs of infection  No signs of wounds    No signs of drainage  No signs of ecchymotic regions  No signs of erythremic regions  Moderate signs of muscle spasm  Moderate signs of muscle guarding  Moderate signs of tenderness reported to palpation  Mild to moderate signs of swelling  Positive signs of a surgery site  Current conditions appears consistent with recent surgery on her left shoulder  Chief Complaints:  2/5/2019  Pedro Encarnacion reports severe difficulty with bending her left shoulder  Pedro Encarnacion reports severe difficulty with movement of her left shoulder  Pedro Encarnacion reports severe difficulty with use of her left arm  Pedro Encarnacion reports severe difficulty with lifting / elevating her left arm  Pedro Encarnacion reports moderate difficulty with sleeping  Pedro Encarnacion reports severe difficulty with her strength and endurance  Pedro Encarnacion reports severe limitations with her left shoulder range of motion  Pedro Encarnacion reports severe difficulty lying on her left shoulder region  Patient sleeps currently in a recliner      SHOULDER PAIN Resting Palpation Moving Lifting Elevating   2/5/2019 Rt 0 0 0 0 0   2/5/2019 Lt  3-5 3-5 8-10 8-10 10+     SHOULDER PAIN Sleeping Throwing Pushing Pulling Twisting   2/5/2019 Rt 0 0 0 0 0   2/5/2019 Lt  3-5 NA NA 8-10 8-10     SHOULDER AROM Flexion Extension Abduction   2/5/2019 Rt 180° 65° 180°   2/5/2019 Lt 25° 10° 5°     SHOULDER AROM Hor Add Ext Rotation Int Rotation   2/5/2019 Rt 70° 95° 95°   2/5/2019 Lt 5° 5° 82°     SHLD MMT Flexion Extension Abduction   2/5/2019 Rt 0/10  28 lbs 0/10  29 lbs 0/10  27 lbs   2/5/2019 Lt 8/10  1 lbs 8/10  2 lbs 8/10  1 lbs     SHLD MMT Hor Add Ext Rotation Int Rotation   2/5/2019 Rt 0/10  31 lbs 0/10  30 lbs 0/10  32 lbs   2/5/2019 Lt 8/10  1 lbs 8/10  1 lbs 8/10  1 lbs     Precautions: Left Shoulder SX    Daily Treatment Log    Manual  2/5       MT, ROM 15       HEP        Exercise Log 2/5       [facility]        FW-Laila's        FW-Curls,Tri        Power Web        Digiflex        Finger Ladder Mando-BP,PD,Lats,Row        Trimax-BP        W/P-PNF,IR,ER,PU,PS,Throw-Top,Mid,Bot        W/P-OH        ME, PE                Modalities 2/5       MH&ES        US

## 2019-02-05 NOTE — PROGRESS NOTES
PT Evaluation     Today's date: 2019  Patient name: Maria R Ardon  : 1965  MRN: [id number]  Referring provider: Kirsty Perez MD  Dx:   Encounter Diagnosis     ICD-10-CM    1  Aftercare Z51 89    2  Acute pain of left shoulder M25 512    3  History of arthroscopic procedure on shoulder Z98 890    4  Rotator cuff arthropathy of left shoulder M12 812      Assessment  Assessment details: Left shoulder arthroscopic surgery rehab  Impairments: abnormal or restricted ROM, activity intolerance, impaired physical strength and pain with function  Understanding of Dx/Px/POC: excellent  Goals  STG 2-4 weeks:   Increase UE strength by 3-6 lbs  Decrease pain by 1-2 levels on 1-10 pain scale  Increase UE PROM by 10-15 Degrees in all planes  Reduce C/O pain with lying on either side  Initiate HEP  LTG 6-8 weeks:   Increase UE strength 10-20 lbs  Demonstrate UE AROM WFL in all planes  Decrease pain to <2  Improve strength by 10-20 lbs  Return to lying on their right or left side with minimal difficulty  Improve sleep status limitations to none  D/C with HEP      Plan  Patient would benefit from: skilled physical therapy  Planned modality interventions: ultrasound and unattended electrical stimulation  Planned therapy interventions: joint mobilization, manual therapy, patient education, self care, strengthening, stretching, therapeutic activities, therapeutic exercise, therapeutic training, flexibility and coordination  Frequency: 3x week  Duration in weeks: 6  Treatment plan discussed with: patient      Subjective Evaluation    Pain  Quality: discomfort, pulling, sharp, radiating, tight, throbbing and knife-like  Relieving factors: medications, ice, change in position, relaxation, rest and support  Aggravating factors: overhead activity, sitting, keyboarding and lifting  Progression: improved    Treatments  Current treatment: physical therapy  Patient Goals  Patient goals for therapy: decreased pain, increased motion, return to work, return to [employer] activities, independence with ADLs/IADLs and increased strength        Objective    Date of onset:  12/15/2018    Date of Surgery:  01/31/2019    History of Present Episode: 2/5/2019  Belkys Webbing states she was pulling on the covers in bed and went to adjust the covers  She pulled hard and slipped  She started to roll out of bed unto the floor  She put her left hand out quickly and stopped herself from falling  She felt immediate pain in her left shoulder  She went for surgery the end of January  Past Medical History:    2/5/2019  Belkys Hinton reports some allergies / general mold  Hypertension  Hypothyroidism  Previous Level of Functional Ability:  2/5/2019  Belkys Hinton states no limitations or issues  No pain levels  Inspection / Palpation:  Shoulder:  2/5/2019  Mesomorphic body type  No signs of infection  No signs of wounds  No signs of drainage  No signs of ecchymotic regions  No signs of erythremic regions  Moderate signs of muscle spasm  Moderate signs of muscle guarding  Moderate signs of tenderness reported to palpation  Mild to moderate signs of swelling  Positive signs of a surgery site  Current conditions appears consistent with recent surgery on her left shoulder  Chief Complaints:  2/5/2019  Belkys Hinton reports severe difficulty with bending her left shoulder  Belkys Hinton reports severe difficulty with movement of her left shoulder  Belkys Hinton reports severe difficulty with use of her left arm  Belkys Hinton reports severe difficulty with lifting / elevating her left arm  Belkys Hinton reports moderate difficulty with sleeping  Belkys Hinton reports severe difficulty with her strength and endurance  Belkys Hinton reports severe limitations with her left shoulder range of motion  Belkys Hinton reports severe difficulty lying on her left shoulder region  Patient sleeps currently in a recliner      SHOULDER PAIN Resting Palpation Moving Lifting Elevating   2/5/2019 Rt 0 0 0 0 0   2/5/2019 Lt  3-5 3-5 8-10 8-10 10+     SHOULDER PAIN Sleeping Throwing Pushing Pulling Twisting   2/5/2019 Rt 0 0 0 0 0   2/5/2019 Lt  3-5 NA NA 8-10 8-10     SHOULDER AROM Flexion Extension Abduction   2/5/2019 Rt 180° 65° 180°   2/5/2019 Lt 25° 10° 5°     SHOULDER AROM Hor Add Ext Rotation Int Rotation   2/5/2019 Rt 70° 95° 95°   2/5/2019 Lt 5° 5° 82°     SHLD MMT Flexion Extension Abduction   2/5/2019 Rt 0/10  28 lbs 0/10  29 lbs 0/10  27 lbs   2/5/2019 Lt 8/10  1 lbs 8/10  2 lbs 8/10  1 lbs     SHLD MMT Hor Add Ext Rotation Int Rotation   2/5/2019 Rt 0/10  31 lbs 0/10  30 lbs 0/10  32 lbs   2/5/2019 Lt 8/10  1 lbs 8/10  1 lbs 8/10  1 lbs     Precautions: Left Shoulder SX    Daily Treatment Log    Manual  2/5       MT, ROM 15       HEP        Exercise Log 2/5       UBC        FWC-Codman's        FWC-Curls,Tri        Power Web        Digiflex        Finger Ladder        Mando-BP,PD,Lats,Row        Trimax-BP        W/P-PNF,IR,ER,PU,PS,Throw-Top,Mid,Bot        W/P-OH        ME, PE                Modalities 2/5       &ES        US

## 2019-02-06 ENCOUNTER — OFFICE VISIT (OUTPATIENT)
Dept: PHYSICAL THERAPY | Facility: CLINIC | Age: 54
End: 2019-02-06
Payer: COMMERCIAL

## 2019-02-06 DIAGNOSIS — M12.812 ROTATOR CUFF ARTHROPATHY OF LEFT SHOULDER: ICD-10-CM

## 2019-02-06 DIAGNOSIS — M25.512 ACUTE PAIN OF LEFT SHOULDER: ICD-10-CM

## 2019-02-06 DIAGNOSIS — Z98.890 HISTORY OF ARTHROSCOPIC PROCEDURE ON SHOULDER: ICD-10-CM

## 2019-02-06 DIAGNOSIS — Z51.89 AFTERCARE: Primary | ICD-10-CM

## 2019-02-06 PROCEDURE — 97014 ELECTRIC STIMULATION THERAPY: CPT | Performed by: PHYSICAL THERAPIST

## 2019-02-06 PROCEDURE — 97110 THERAPEUTIC EXERCISES: CPT | Performed by: PHYSICAL THERAPIST

## 2019-02-06 PROCEDURE — 97140 MANUAL THERAPY 1/> REGIONS: CPT | Performed by: PHYSICAL THERAPIST

## 2019-02-06 NOTE — PROGRESS NOTES
Today's date: 2019  Patient name: Maria R Ardon  : 1965  MRN: [id number]  Referring provider: Kirsty Perez MD  Dx:   Encounter Diagnosis     ICD-10-CM    1  Aftercare Z51 89    2  Acute pain of left shoulder M25 512    3  History of arthroscopic procedure on shoulder Z98 890    4  Rotator cuff arthropathy of left shoulder M12 812      Subjective:  Linda Jacobsen states her left shoulder feels sore today but no major increase in pain levels  She tolerate her care well today  Objective: See treatment log below    Assessment: Tolerated treatment well  Patient exhibited good technique with therapeutic exercises and would benefit from continued PT    Plan: Continue per plan of care  Progress treatment as tolerated         Precautions: Left Shoulder SX    Daily Treatment Log    Manual        MT, ROM 15' 15'      HEP        Exercise Log       UBC  10'-ROM      FWC-Codman's        FWC-Curls,Tri        Power Web        Digiflex        Finger Ladder        Mando-BP,PD,Lats,Row        Trimax-BP        W/P-PNF,IR,ER,PU,PS,Throw-Top,Mid,Bot        W/P-OH  5'      ME, PE  15'              Modalities       MH&ES        US        CP&ES  20'

## 2019-02-08 ENCOUNTER — OFFICE VISIT (OUTPATIENT)
Dept: PHYSICAL THERAPY | Facility: CLINIC | Age: 54
End: 2019-02-08
Payer: COMMERCIAL

## 2019-02-08 DIAGNOSIS — Z98.890 HISTORY OF ARTHROSCOPIC PROCEDURE ON SHOULDER: ICD-10-CM

## 2019-02-08 DIAGNOSIS — M12.812 ROTATOR CUFF ARTHROPATHY OF LEFT SHOULDER: ICD-10-CM

## 2019-02-08 DIAGNOSIS — M25.512 ACUTE PAIN OF LEFT SHOULDER: Primary | ICD-10-CM

## 2019-02-08 DIAGNOSIS — Z51.89 AFTERCARE: ICD-10-CM

## 2019-02-08 PROCEDURE — 97140 MANUAL THERAPY 1/> REGIONS: CPT | Performed by: PHYSICAL THERAPIST

## 2019-02-08 PROCEDURE — 97110 THERAPEUTIC EXERCISES: CPT | Performed by: PHYSICAL THERAPIST

## 2019-02-08 NOTE — PROGRESS NOTES
Today's date: 2019  Patient name: Nadir Pool  : 1965  MRN: [id number]  Referring provider: Errol Rowe MD  Dx:   Encounter Diagnosis     ICD-10-CM    1  Acute pain of left shoulder M25 512    2  History of arthroscopic procedure on shoulder Z98 890    3  Rotator cuff arthropathy of left shoulder M12 812    4  Aftercare Z51 89      Subjective:  Abbey Rivera states her left shoulder feels a little better  Objective: See treatment log below    Assessment: Tolerated treatment well  Patient exhibited good technique with therapeutic exercises and would benefit from continued PT    Plan: Continue per plan of care  Progress treatment as tolerated         Precautions: Left Shoulder SX    Daily Treatment Log    Manual       MT, ROM 15' 15' 15'     HEP        Exercise Log      UBC  10'-ROM 10'-ROM     FWC-Codman's        FWC-Curls,Tri        Power Web        Digiflex        Finger Ladder        Mando-BP,PD,Lats,Row        Trimax-BP        W/P-PNF,IR,ER,PU,PS,Throw-Top,Mid,Bot        W/P-OH  5' 5'     ME, PE  13' 15'             Modalities      MH&ES        US        CP&ES  20'

## 2019-02-11 ENCOUNTER — TRANSCRIBE ORDERS (OUTPATIENT)
Dept: PHYSICAL THERAPY | Facility: CLINIC | Age: 54
End: 2019-02-11

## 2019-02-11 ENCOUNTER — OFFICE VISIT (OUTPATIENT)
Dept: PHYSICAL THERAPY | Facility: CLINIC | Age: 54
End: 2019-02-11
Payer: COMMERCIAL

## 2019-02-11 DIAGNOSIS — M12.812 ROTATOR CUFF ARTHROPATHY OF LEFT SHOULDER: ICD-10-CM

## 2019-02-11 DIAGNOSIS — M25.512 ACUTE PAIN OF LEFT SHOULDER: ICD-10-CM

## 2019-02-11 DIAGNOSIS — M25.512 ACUTE PAIN OF LEFT SHOULDER: Primary | ICD-10-CM

## 2019-02-11 DIAGNOSIS — Z51.89 AFTERCARE: Primary | ICD-10-CM

## 2019-02-11 DIAGNOSIS — Z98.890 HISTORY OF ARTHROSCOPIC PROCEDURE ON SHOULDER: ICD-10-CM

## 2019-02-11 DIAGNOSIS — Z51.89 AFTERCARE: ICD-10-CM

## 2019-02-11 PROCEDURE — 97110 THERAPEUTIC EXERCISES: CPT | Performed by: PHYSICAL THERAPIST

## 2019-02-11 PROCEDURE — 97140 MANUAL THERAPY 1/> REGIONS: CPT | Performed by: PHYSICAL THERAPIST

## 2019-02-11 NOTE — PROGRESS NOTES
Today's date: 2019  Patient name: Chava Feng  : 1965  MRN: [id number]  Referring provider: Shantal Cheney MD  Dx:   Encounter Diagnosis     ICD-10-CM    1  Acute pain of left shoulder M25 512    2  History of arthroscopic procedure on shoulder Z98 890    3  Rotator cuff arthropathy of left shoulder M12 812    4  Aftercare Z51 89      Subjective:  Karyle Malta states her left shoulder is still sore but she is getting better  Objective: See treatment log below    Assessment: Tolerated treatment well  Patient exhibited good technique with therapeutic exercises and would benefit from continued PT    Plan: Continue per plan of care  Progress treatment as tolerated         Precautions: Left Shoulder SX    Daily Treatment Log    Manual      MT, ROM 15' 15' 15' 15'    HEP        Exercise Log     UBC  10'-ROM 10'-ROM 10'-ROM    FWC-Codman's        FWC-Curls,Tri        Power Web        Digiflex        Finger Ladder        Mando-BP,PD,Lats,Row        Trimax-BP        W/P-PNF,IR,ER,PU,PS,Throw-Top,Mid,Bot        W/P-OH  5' 5' 10'    ME, PE  13' 15' 15'            Modalities     MH&ES    20'            CP&ES  20'

## 2019-02-13 ENCOUNTER — APPOINTMENT (OUTPATIENT)
Dept: PHYSICAL THERAPY | Facility: CLINIC | Age: 54
End: 2019-02-13
Payer: COMMERCIAL

## 2019-02-14 ENCOUNTER — OFFICE VISIT (OUTPATIENT)
Dept: PHYSICAL THERAPY | Facility: CLINIC | Age: 54
End: 2019-02-14
Payer: COMMERCIAL

## 2019-02-14 DIAGNOSIS — Z51.89 AFTERCARE: ICD-10-CM

## 2019-02-14 DIAGNOSIS — Z98.890 HISTORY OF ARTHROSCOPIC PROCEDURE ON SHOULDER: ICD-10-CM

## 2019-02-14 DIAGNOSIS — M12.812 ROTATOR CUFF ARTHROPATHY OF LEFT SHOULDER: ICD-10-CM

## 2019-02-14 DIAGNOSIS — M25.512 ACUTE PAIN OF LEFT SHOULDER: Primary | ICD-10-CM

## 2019-02-14 PROCEDURE — 97014 ELECTRIC STIMULATION THERAPY: CPT | Performed by: PHYSICAL THERAPIST

## 2019-02-14 PROCEDURE — 97140 MANUAL THERAPY 1/> REGIONS: CPT | Performed by: PHYSICAL THERAPIST

## 2019-02-14 PROCEDURE — 97110 THERAPEUTIC EXERCISES: CPT | Performed by: PHYSICAL THERAPIST

## 2019-02-14 NOTE — PROGRESS NOTES
Today's date: 2019  Patient name: Ramila Morales  : 1965  MRN: [id number]  Referring provider: Laila Valencia MD  Dx:   Encounter Diagnosis     ICD-10-CM    1  Acute pain of left shoulder M25 512    2  History of arthroscopic procedure on shoulder Z98 890    3  Rotator cuff arthropathy of left shoulder M12 812    4  Aftercare Z51 89      Subjective:  Emilia Ramirez states her left shoulder is feeling better and better  She is improving with range of motion  Objective: See treatment log below    Assessment: Tolerated treatment well  Patient exhibited good technique with therapeutic exercises and would benefit from continued PT    Plan: Continue per plan of care  Progress treatment as tolerated         Precautions: Left Shoulder SX    Daily Treatment Log    Manual     MT, ROM 15' 15' 15' 15' 15'   HEP        Exercise Log    UBC  10'-ROM 10'-ROM 10'-ROM 10'-ROM   FWC-Codman's        FWC-Curls,Tri        Power Web        Digiflex        Finger Ladder        Mando-BP,PD,Lats,Row        Trimax-BP        W/P-PNF,IR,ER,PU,PS,Throw-Top,Mid,Bot        W/P-OH  5' 5' 10' 10'   ME, PE  15' 15' 15' 15'           Modalities    MH&ES    20' 20'   US        CP&ES  20'

## 2019-02-15 ENCOUNTER — OFFICE VISIT (OUTPATIENT)
Dept: PHYSICAL THERAPY | Facility: CLINIC | Age: 54
End: 2019-02-15
Payer: COMMERCIAL

## 2019-02-15 DIAGNOSIS — M12.812 ROTATOR CUFF ARTHROPATHY OF LEFT SHOULDER: ICD-10-CM

## 2019-02-15 DIAGNOSIS — Z51.89 AFTERCARE: ICD-10-CM

## 2019-02-15 DIAGNOSIS — Z98.890 HISTORY OF ARTHROSCOPIC PROCEDURE ON SHOULDER: Primary | ICD-10-CM

## 2019-02-15 DIAGNOSIS — M25.512 ACUTE PAIN OF LEFT SHOULDER: ICD-10-CM

## 2019-02-15 PROCEDURE — 97140 MANUAL THERAPY 1/> REGIONS: CPT | Performed by: PHYSICAL THERAPIST

## 2019-02-15 PROCEDURE — 97014 ELECTRIC STIMULATION THERAPY: CPT | Performed by: PHYSICAL THERAPIST

## 2019-02-15 PROCEDURE — 97110 THERAPEUTIC EXERCISES: CPT | Performed by: PHYSICAL THERAPIST

## 2019-02-15 NOTE — PROGRESS NOTES
Today's date: 2/15/2019  Patient name: Efrem Berger  : 1965  MRN: [id number]  Referring provider: Hang Holder MD  Dx:   Encounter Diagnosis     ICD-10-CM    1  History of arthroscopic procedure on shoulder Z98 890    2  Rotator cuff arthropathy of left shoulder M12 812    3  Aftercare Z51 89    4  Acute pain of left shoulder M25 512      Subjective:  Xavier Roque states her left shoulder is feeling better  Objective: See treatment log below    Assessment: Tolerated treatment well  Patient exhibited good technique with therapeutic exercises and would benefit from continued PT    Plan: Continue per plan of care  Progress treatment as tolerated         Precautions: Left Shoulder SX    Daily Treatment Log    Manual  2/15       MT, ROM 15'       HEP        Exercise Log 2/15       UBC 10"-ROM       FWC-Codman's 30x-No ABD       FWC-Curls,Tri        Power Web Yellow-30x       Digiflex Yellow-30x       Finger Ladder        Mando-BP,PD,Lats,Row        Trimax-BP        W/P-PNF,IR,ER,PU,PS,Throw-Top,Mid,Bot        W/P-OH 10'       ME, PE 15'               Modalities 2/15       MH&ES 20'       US        CP&ES

## 2019-02-18 ENCOUNTER — OFFICE VISIT (OUTPATIENT)
Dept: PHYSICAL THERAPY | Facility: CLINIC | Age: 54
End: 2019-02-18
Payer: COMMERCIAL

## 2019-02-18 DIAGNOSIS — M25.512 ACUTE PAIN OF LEFT SHOULDER: ICD-10-CM

## 2019-02-18 DIAGNOSIS — M12.812 ROTATOR CUFF ARTHROPATHY OF LEFT SHOULDER: ICD-10-CM

## 2019-02-18 DIAGNOSIS — Z51.89 AFTERCARE: ICD-10-CM

## 2019-02-18 DIAGNOSIS — Z98.890 HISTORY OF ARTHROSCOPIC PROCEDURE ON SHOULDER: Primary | ICD-10-CM

## 2019-02-18 PROCEDURE — 97140 MANUAL THERAPY 1/> REGIONS: CPT | Performed by: PHYSICAL THERAPIST

## 2019-02-18 PROCEDURE — 97110 THERAPEUTIC EXERCISES: CPT | Performed by: PHYSICAL THERAPIST

## 2019-02-18 PROCEDURE — 97014 ELECTRIC STIMULATION THERAPY: CPT | Performed by: PHYSICAL THERAPIST

## 2019-02-18 NOTE — PROGRESS NOTES
Today's date: 2019  Patient name: Chava Feng  : 1965  MRN: [id number]  Referring provider: Shantal Cheney MD  Dx:   Encounter Diagnosis     ICD-10-CM    1  History of arthroscopic procedure on shoulder Z98 890    2  Rotator cuff arthropathy of left shoulder M12 812    3  Aftercare Z51 89    4  Acute pain of left shoulder M25 512      Subjective:  Karyle Malta states her left shoiudler is feeling better but was sore over the week end  She feels OK today  Objective: See treatment log below    Assessment: Tolerated treatment well  Patient exhibited good technique with therapeutic exercises and would benefit from continued PT    Plan: Continue per plan of care  Progress treatment as tolerated         Precautions: Left Shoulder SX    Daily Treatment Log    Manual  2/15 2/18      MT, ROM 15' 15'      HEP        Exercise Log 2/15 2/18      UBC 10"-ROM 10'ROM      FWC-Codman's 30x-No ABD 30x      FWC-Curls,Tri        Power Web Yellow-30x       Digiflex Yellow-30x       Finger Ladder        Mando-BP,PD,Lats,Row        Trimax-BP        W/P-PNF,IR,ER,PU,PS,Throw-Top,Mid,Bot        W/P-OH 10' 10'      ME, PE 13' 15'              Modalities 2/15 2/18      MH&ES 20' 20'      US        CP&ES

## 2019-02-20 ENCOUNTER — OFFICE VISIT (OUTPATIENT)
Dept: PHYSICAL THERAPY | Facility: CLINIC | Age: 54
End: 2019-02-20
Payer: COMMERCIAL

## 2019-02-20 DIAGNOSIS — Z98.890 HISTORY OF ARTHROSCOPIC PROCEDURE ON SHOULDER: Primary | ICD-10-CM

## 2019-02-20 DIAGNOSIS — M12.812 ROTATOR CUFF ARTHROPATHY OF LEFT SHOULDER: ICD-10-CM

## 2019-02-20 DIAGNOSIS — Z51.89 AFTERCARE: ICD-10-CM

## 2019-02-20 DIAGNOSIS — M25.512 ACUTE PAIN OF LEFT SHOULDER: ICD-10-CM

## 2019-02-20 PROCEDURE — 97110 THERAPEUTIC EXERCISES: CPT | Performed by: PHYSICAL THERAPIST

## 2019-02-20 PROCEDURE — 97035 APP MDLTY 1+ULTRASOUND EA 15: CPT | Performed by: PHYSICAL THERAPIST

## 2019-02-20 PROCEDURE — 97140 MANUAL THERAPY 1/> REGIONS: CPT | Performed by: PHYSICAL THERAPIST

## 2019-02-20 PROCEDURE — 97014 ELECTRIC STIMULATION THERAPY: CPT | Performed by: PHYSICAL THERAPIST

## 2019-02-20 NOTE — PROGRESS NOTES
Today's date: 2019  Patient name: Ramlia Morales  : 1965  MRN: [id number]  Referring provider: Laila Valencia MD  Dx:   Encounter Diagnosis     ICD-10-CM    1  History of arthroscopic procedure on shoulder Z98 890    2  Rotator cuff arthropathy of left shoulder M12 812    3  Aftercare Z51 89    4  Acute pain of left shoulder M25 512      Subjective:  Emilia Ramirez states her left shoulder is slowly feeling better  Objective: See treatment log below    Assessment: Tolerated treatment well  Patient exhibited good technique with therapeutic exercises and would benefit from continued PT    Plan: Continue per plan of care  Progress treatment as tolerated         Precautions: Left Shoulder SX    Daily Treatment Log    Manual  2/15 2/18 2/20     MT, ROM 15' 15' 15'     HEP        Exercise Log 2/15 2/18 2/20     UBC 10"-ROM 10'-ROM 10'-ROM     FWC-Codman's 30x-No ABD 30x 30x     FWC-Curls,Tri        Power Web Yellow-30x  Yellow-30x     Digiflex Yellow-30x  Yellow-30x     Finger Ladder        Mando-BP,PD,Lats,Row        Trimax-BP        W/P-PNF,IR,ER,PU,PS,Throw-Top,Mid,Bot        W/P-OH 10' 10' 10'     ME, PE 15' 15' 15'             Modalities 2/15 2/18 2/20     MH&ES 20' 20' 20'     US   10'     CP&ES

## 2019-02-21 ENCOUNTER — OFFICE VISIT (OUTPATIENT)
Dept: PHYSICAL THERAPY | Facility: CLINIC | Age: 54
End: 2019-02-21
Payer: COMMERCIAL

## 2019-02-21 DIAGNOSIS — Z51.89 AFTERCARE: ICD-10-CM

## 2019-02-21 DIAGNOSIS — M25.512 ACUTE PAIN OF LEFT SHOULDER: ICD-10-CM

## 2019-02-21 DIAGNOSIS — Z98.890 HISTORY OF ARTHROSCOPIC PROCEDURE ON SHOULDER: Primary | ICD-10-CM

## 2019-02-21 DIAGNOSIS — M12.812 ROTATOR CUFF ARTHROPATHY OF LEFT SHOULDER: ICD-10-CM

## 2019-02-21 PROCEDURE — 97035 APP MDLTY 1+ULTRASOUND EA 15: CPT | Performed by: PHYSICAL THERAPIST

## 2019-02-21 PROCEDURE — 97014 ELECTRIC STIMULATION THERAPY: CPT | Performed by: PHYSICAL THERAPIST

## 2019-02-21 PROCEDURE — 97110 THERAPEUTIC EXERCISES: CPT | Performed by: PHYSICAL THERAPIST

## 2019-02-21 PROCEDURE — 97140 MANUAL THERAPY 1/> REGIONS: CPT | Performed by: PHYSICAL THERAPIST

## 2019-02-21 NOTE — PROGRESS NOTES
Today's date: 2019  Patient name: Tico Moyer  : 1965  MRN: [id number]  Referring provider: Yo Lemon MD  Dx:   Encounter Diagnosis     ICD-10-CM    1  History of arthroscopic procedure on shoulder Z98 890    2  Rotator cuff arthropathy of left shoulder M12 812    3  Aftercare Z51 89    4  Acute pain of left shoulder M25 512      Subjective:  Noah Camargo states her left shoulder is slowly feeling better  Patient did state that if she moves her left shoulder "wrong" she can get a sharp pain with her left shoulder region  She is usually pain free  Patient still cannot sleep in her own bed and uses a recliner  She tried the bed three days ago and that lasted about two hours and than moved to the recliner  Objective: See treatment log below    Assessment: Tolerated treatment well  Patient exhibited good technique with therapeutic exercises and would benefit from continued PT    Plan: Continue per plan of care  Progress treatment as tolerated         Precautions: Left Shoulder SX    Daily Treatment Log    Manual  2/15 2/18 2/20 2/21    MT, ROM 15' 15' 15' 15'    HEP        Exercise Log 2/15 2/18 2/20 2/21    UBC 10"-ROM 10'-ROM 10'-ROM 10'-ROM    FWC-Codman's 30x-No ABD 30x 30x 30x    FWC-Curls,Tri        Power Web Yellow-30x  Yellow-30x Yellow-30x    Digiflex Yellow-30x  Yellow-30x Yellow-30x    Finger Ladder        Mando-BP,PD,Lats,Row        Trimax-BP        W/P-PNF,IR,ER,PU,PS,Throw-Top,Mid,Bot        W/P-OH 10' 10' 10' 10'    ME, PE 15' 15' 15' 15'            Modalities 2/15 2/18 2/20 2/21    MH&ES 20' 20' 20' '    US   10' 10'    CP&ES

## 2019-02-25 ENCOUNTER — APPOINTMENT (OUTPATIENT)
Dept: PHYSICAL THERAPY | Facility: CLINIC | Age: 54
End: 2019-02-25
Payer: COMMERCIAL

## 2019-02-26 ENCOUNTER — OFFICE VISIT (OUTPATIENT)
Dept: PHYSICAL THERAPY | Facility: CLINIC | Age: 54
End: 2019-02-26
Payer: COMMERCIAL

## 2019-02-26 DIAGNOSIS — Z51.89 AFTERCARE: Primary | ICD-10-CM

## 2019-02-26 DIAGNOSIS — M25.512 ACUTE PAIN OF LEFT SHOULDER: ICD-10-CM

## 2019-02-26 DIAGNOSIS — M12.812 ROTATOR CUFF ARTHROPATHY OF LEFT SHOULDER: ICD-10-CM

## 2019-02-26 DIAGNOSIS — Z98.890 HISTORY OF ARTHROSCOPIC PROCEDURE ON SHOULDER: ICD-10-CM

## 2019-02-26 PROCEDURE — 97110 THERAPEUTIC EXERCISES: CPT | Performed by: PHYSICAL THERAPIST

## 2019-02-26 PROCEDURE — 97035 APP MDLTY 1+ULTRASOUND EA 15: CPT | Performed by: PHYSICAL THERAPIST

## 2019-02-26 PROCEDURE — 97014 ELECTRIC STIMULATION THERAPY: CPT | Performed by: PHYSICAL THERAPIST

## 2019-02-26 PROCEDURE — 97140 MANUAL THERAPY 1/> REGIONS: CPT | Performed by: PHYSICAL THERAPIST

## 2019-02-26 NOTE — PROGRESS NOTES
Today's date: 2019  Patient name: Chava Feng  : 1965  MRN: [id number]  Referring provider: Shantal Cheney MD  Dx:   Encounter Diagnosis     ICD-10-CM    1  Aftercare Z51 89    2  Acute pain of left shoulder M25 512    3  Rotator cuff arthropathy of left shoulder M12 812    4  History of arthroscopic procedure on shoulder Z98 890      Subjective:  Karyle Malta states her left shoulder is feeling OK  She saw the doctor and is not allowed to abduct her left shoulder  Gentle ROM is OK but no active Abduction  Objective: See treatment log below    Assessment: Tolerated treatment well  Patient exhibited good technique with therapeutic exercises and would benefit from continued PT    Plan: Continue per plan of care  Progress treatment as tolerated         Precautions: Left Shoulder SX    Daily Treatment Log    Manual  2/15 2/18 2/20 2/21 2/26   MT, ROM 15' 15' 15' 15' 15'   HEP        Exercise Log 2/15 2/18 2/20 2/21 2/26   UBC 10"-ROM 10'-ROM 10'-ROM 10'-ROM 10'-ROM   FWC-Codman's 30x-No ABD 0#-30x 0#-30x 0#-30x 0#-30x   FWC-Curls,Tri        Power Web Yellow-30x  Yellow-30x Yellow-30x Yellow-30x   Digiflex Yellow-30x  Yellow-30x Yellow-30x Yellow-30x   Finger Ladder        Mando-BP,PD,Lats,Row        Trimax-BP        W/P-PNF,IR,ER,PU,PS,Throw-Top,Mid,Bot        W/P-OH 10' 10' 10' 10' 10'   ME, PE 15' 15' 15' 15' 15'           Modalities 2/15 2/18 2/20 2/21 2/26   MH&ES 20' 20' 20' 20' 20'   US    10' 10'   CP&ES

## 2019-02-27 ENCOUNTER — APPOINTMENT (OUTPATIENT)
Dept: PHYSICAL THERAPY | Facility: CLINIC | Age: 54
End: 2019-02-27
Payer: COMMERCIAL

## 2019-02-28 ENCOUNTER — OFFICE VISIT (OUTPATIENT)
Dept: PHYSICAL THERAPY | Facility: CLINIC | Age: 54
End: 2019-02-28
Payer: COMMERCIAL

## 2019-02-28 DIAGNOSIS — M12.812 ROTATOR CUFF ARTHROPATHY OF LEFT SHOULDER: ICD-10-CM

## 2019-02-28 DIAGNOSIS — M25.512 ACUTE PAIN OF LEFT SHOULDER: ICD-10-CM

## 2019-02-28 DIAGNOSIS — Z51.89 AFTERCARE: Primary | ICD-10-CM

## 2019-02-28 DIAGNOSIS — Z98.890 HISTORY OF ARTHROSCOPIC PROCEDURE ON SHOULDER: ICD-10-CM

## 2019-02-28 PROCEDURE — 97110 THERAPEUTIC EXERCISES: CPT | Performed by: PHYSICAL THERAPIST

## 2019-02-28 PROCEDURE — 97014 ELECTRIC STIMULATION THERAPY: CPT | Performed by: PHYSICAL THERAPIST

## 2019-02-28 PROCEDURE — 97140 MANUAL THERAPY 1/> REGIONS: CPT | Performed by: PHYSICAL THERAPIST

## 2019-02-28 NOTE — PROGRESS NOTES
Today's date: 2019  Patient name: Chava Feng  : 1965  MRN: [id number]  Referring provider: Shantal Cheney MD  Dx:   Encounter Diagnosis     ICD-10-CM    1  Aftercare Z51 89    2  Acute pain of left shoulder M25 512    3  Rotator cuff arthropathy of left shoulder M12 812    4  History of arthroscopic procedure on shoulder Z98 890      Subjective:  Karyle Malta states her left shoulder feels a little better today  Objective: See treatment log below    Assessment: Tolerated treatment well  Patient exhibited good technique with therapeutic exercises and would benefit from continued PT    Plan: Continue per plan of care  Progress treatment as tolerated         Precautions: Left Shoulder SX    Daily Treatment Log    Manual     MT, ROM 15' 15' 15' 15' 15'   HEP        Exercise Log    UBC 10"-ROM 10'-ROM 10'-ROM 10'-ROM 10'-ROM   FWC-Codman's 30x-No ABD 0#-30x 0#-30x 0#-30x 0#-30x   FWC-Curls,Tri        Power Web Yellow-30x  Yellow-30x Yellow-30x Yellow-30x   Digiflex Yellow-30x  Yellow-30x Yellow-30x Yellow-30x   Finger Ladder        Mando-BP,PD,Lats,Row        Trimax-BP        W/P-PNF,IR,ER,PU,PS,Throw-Top,Mid,Bot        W/P-OH 10' 10' 10' 10' 10'   ME, PE 15' 15' 15' 15' 15'           Modalities    MH&ES 20' 20' 20' 20' 20'   US    10' 10'   CP&ES

## 2019-03-04 ENCOUNTER — APPOINTMENT (OUTPATIENT)
Dept: PHYSICAL THERAPY | Facility: CLINIC | Age: 54
End: 2019-03-04
Payer: COMMERCIAL

## 2019-03-05 ENCOUNTER — TRANSCRIBE ORDERS (OUTPATIENT)
Dept: PHYSICAL THERAPY | Facility: CLINIC | Age: 54
End: 2019-03-05

## 2019-03-05 ENCOUNTER — OFFICE VISIT (OUTPATIENT)
Dept: FAMILY MEDICINE CLINIC | Age: 54
End: 2019-03-05
Payer: COMMERCIAL

## 2019-03-05 ENCOUNTER — OFFICE VISIT (OUTPATIENT)
Dept: PHYSICAL THERAPY | Facility: CLINIC | Age: 54
End: 2019-03-05
Payer: COMMERCIAL

## 2019-03-05 VITALS
BODY MASS INDEX: 41.83 KG/M2 | WEIGHT: 243.7 LBS | DIASTOLIC BLOOD PRESSURE: 86 MMHG | SYSTOLIC BLOOD PRESSURE: 130 MMHG | RESPIRATION RATE: 16 BRPM | HEART RATE: 82 BPM

## 2019-03-05 DIAGNOSIS — M12.812 ROTATOR CUFF ARTHROPATHY OF LEFT SHOULDER: ICD-10-CM

## 2019-03-05 DIAGNOSIS — Z51.89 AFTERCARE: Primary | ICD-10-CM

## 2019-03-05 DIAGNOSIS — Z71.89 DIABETES EDUCATION, ENCOUNTER FOR: ICD-10-CM

## 2019-03-05 DIAGNOSIS — Z98.890 HISTORY OF ARTHROSCOPIC PROCEDURE ON SHOULDER: ICD-10-CM

## 2019-03-05 DIAGNOSIS — I10 ESSENTIAL HYPERTENSION: ICD-10-CM

## 2019-03-05 DIAGNOSIS — M25.512 ACUTE PAIN OF LEFT SHOULDER: ICD-10-CM

## 2019-03-05 DIAGNOSIS — E78.2 MIXED HYPERLIPIDEMIA: ICD-10-CM

## 2019-03-05 DIAGNOSIS — E11.9 TYPE 2 DIABETES MELLITUS WITHOUT COMPLICATION, WITHOUT LONG-TERM CURRENT USE OF INSULIN (HCC): Primary | ICD-10-CM

## 2019-03-05 PROCEDURE — 99214 OFFICE O/P EST MOD 30 MIN: CPT | Performed by: NURSE PRACTITIONER

## 2019-03-05 PROCEDURE — 97035 APP MDLTY 1+ULTRASOUND EA 15: CPT | Performed by: PHYSICAL THERAPIST

## 2019-03-05 PROCEDURE — 97014 ELECTRIC STIMULATION THERAPY: CPT | Performed by: PHYSICAL THERAPIST

## 2019-03-05 PROCEDURE — 97140 MANUAL THERAPY 1/> REGIONS: CPT | Performed by: PHYSICAL THERAPIST

## 2019-03-05 PROCEDURE — 97110 THERAPEUTIC EXERCISES: CPT | Performed by: PHYSICAL THERAPIST

## 2019-03-05 PROCEDURE — 97164 PT RE-EVAL EST PLAN CARE: CPT | Performed by: PHYSICAL THERAPIST

## 2019-03-05 PROCEDURE — 95251 CONT GLUC MNTR ANALYSIS I&R: CPT | Performed by: NURSE PRACTITIONER

## 2019-03-05 ASSESSMENT — ENCOUNTER SYMPTOMS
RESPIRATORY NEGATIVE: 1
SHORTNESS OF BREATH: 0
BLURRED VISION: 0
VISUAL CHANGE: 0
DIARRHEA: 0
ABDOMINAL PAIN: 0

## 2019-03-05 NOTE — PROGRESS NOTES
PT Re-Evaluation     Today's date: 3/5/2019  Patient name: Ramila Morales  : 1965  MRN: [id number]  Referring provider: Laila Valencia MD  Dx:   Encounter Diagnosis     ICD-10-CM    1  Aftercare Z51 89    2  Acute pain of left shoulder M25 512    3  Rotator cuff arthropathy of left shoulder M12 812    4  History of arthroscopic procedure on shoulder Z98 890      Assessment  Assessment details: Patient states she still feels weak  Patient states she still is not abducting or elevating her left shoulder  Impairments: abnormal or restricted ROM, abnormal movement, activity intolerance, impaired physical strength, pain with function, safety issue and scapular dyskinesis  Understanding of Dx/Px/POC: excellent  Goals  STG 2-4 weeks:   Increase UE strength by 3-6 lbs  Decrease pain by 1-2 levels on 1-10 pain scale  Increase UE PROM by 10-15 Degrees in all planes  Reduce C/O pain with lying on either side  Initiate HEP  LTG 6-8 weeks:   Increase UE strength 10-20 lbs  Demonstrate UE AROM WFL in all planes  Decrease pain to <2  Improve strength by 10-20 lbs  Return to lying on their right or left side with minimal difficulty  Improve sleep status limitations to none  D/C with HEP      Plan  Patient would benefit from: skilled physical therapy  Planned modality interventions: ultrasound and unattended electrical stimulation  Planned therapy interventions: joint mobilization, manual therapy, patient education, postural training, self care, strengthening, stretching, therapeutic activities, therapeutic exercise, flexibility, coordination and home exercise program  Frequency: 3x week  Duration in weeks: 6  Treatment plan discussed with: patient      Subjective Evaluation    Pain  Quality: discomfort, pulling, sharp, radiating, tight and squeezing  Relieving factors: heat, medications, change in position, ice, relaxation, rest and support  Aggravating factors: lifting and overhead activity  Progression: improved    Treatments  Current treatment: physical therapy  Patient Goals  Patient goals for therapy: decreased pain, increased motion, return to work, return to [employer] activities, independence with ADLs/IADLs and increased strength        Objective     Date of onset:  12/15/2018    Date of Surgery:  01/31/2019    History of Present Episode: 2/5/2019  Spencer states she was pulling on the covers in bed and went to adjust the covers  She pulled hard and slipped  She started to roll out of bed unto the floor  She put her left hand out quickly and stopped herself from falling  She felt immediate pain in her left shoulder  She went for surgery the end of January  Past Medical History:    2/5/2019  Spencer reports some allergies / general mold  Hypertension  Hypothyroidism  Previous Level of Functional Ability:  2/5/2019  Spencer states no limitations or issues  No pain levels  Inspection / Palpation:  Shoulder:  2/5/2019  Mesomorphic body type  No signs of infection  No signs of wounds  No signs of drainage  No signs of ecchymotic regions  No signs of erythremic regions  Moderate signs of muscle spasm  Moderate signs of muscle guarding  Moderate signs of tenderness reported to palpation  Mild to moderate signs of swelling  Positive signs of a surgery site  Current conditions appears consistent with recent surgery on her left shoulder  Chief Complaints:  2/5/2019  Spencer reports severe difficulty with bending her left shoulder  Spencer reports severe difficulty with movement of her left shoulder  Spencer reports severe difficulty with use of her left arm  Spencer reports severe difficulty with lifting / elevating her left arm  Spenecr reports moderate difficulty with sleeping  Spencer reports severe difficulty with her strength and endurance  Spencer reports severe limitations with her left shoulder range of motion    Spencer reports severe difficulty lying on her left shoulder region  Patient sleeps currently in a recliner      SHOULDER PAIN Resting Palpation Moving Lifting Elevating   2/5/2019 Rt 0 0 0 0 0   2/5/2019 Lt  3-5 3-5 8-10 8-10 10+   3/5/19 Lt 1-3 1-3 4-6 4-6 5-9     SHOULDER PAIN Sleeping Throwing Pushing Pulling Twisting   2/5/2019 Rt 0 0 0 0 0   2/5/2019 Lt  3-5 NA NA 8-10 8-10   3/5/19 Lt 1-3 NA 3-6 4-6 4-6     SHOULDER AROM Flexion Extension Abduction   2/5/2019 Rt 180° 65° 180°   2/5/2019 Lt 25° 10° 5°   3/5/19 Lt 125° 32° 35°     SHOULDER AROM Hor Add Ext Rotation Int Rotation   2/5/2019 Rt 70° 95° 95°   2/5/2019 Lt 5° 5° 82°   3/5/19 Lt 35° 26° 90°     SHLD MMT Flexion Extension Abduction   2/5/2019 Rt 0/10  28 lbs 0/10  29 lbs 0/10  27 lbs   2/5/2019 Lt 8/10  1 lbs 8/10  2 lbs 8/10  1 lbs   NT   3/5/19 Lt 4/10  6 lbs 4/10  7 lbs 8/10  1 lbs  NT     SHLD MMT Hor Add Ext Rotation Int Rotation   2/5/2019 Rt 0/10  31 lbs 0/10  30 lbs 0/10  32 lbs   2/5/2019 Lt 8/10  1 lbs 8/10  1 lbs  NT 8/10  1 lbs   3/5/19 Lt 4/10  6 lbs 4/10  1 lbs  NT 4/10  6 lbs     Precautions: Left Shoulder SX

## 2019-03-05 NOTE — LETTER
2019    Rowan Pineda, [address] [address] [place] [address]    Patient: Alicia Camara   YOB: 1965   Date of Visit: 3/5/2019     Encounter Diagnosis     ICD-10-CM    1  Aftercare Z51 89    2  Acute pain of left shoulder M25 512    3  Rotator cuff arthropathy of left shoulder M12 812    4  History of arthroscopic procedure on shoulder Z98 890        Dear Dr Simental Skill:    Please review the attached Plan of Care from [facility] recent visit  Please verify that you agree therapy should continue by signing the attached document and sending it back to our office  If you have any questions or concerns, please don't hesitate to call  Sincerely,    Hoa Chung, PT      Referring Provider:      I certify that I have read the below Plan of Care and certify the need for these services furnished under this plan of treatment while under my care  Rowan Pineda, [address] [address] [place] [address] [facility] [address] [phone]          Today's date: 3/5/2019  Patient name: Alicia Camara  : 1965  MRN: [id number]  Referring provider: Allie Leigh MD  Dx:   Encounter Diagnosis     ICD-10-CM    1  Aftercare Z51 89    2  Acute pain of left shoulder M25 512    3  Rotator cuff arthropathy of left shoulder M12 812    4  History of arthroscopic procedure on shoulder Z98 890      Subjective:  Gilmer Panda states her left shoulder is slowly feeling better  Her left shoulder is still very stiff and limited in movement  She feels her strength is also very low / limited  Objective: See treatment log below    Assessment: Tolerated treatment well  Patient exhibited good technique with therapeutic exercises and would benefit from continued PT    Plan: Continue per plan of care  Progress treatment as tolerated         Precautions: Left Shoulder SX    Daily Treatment Log    Manual  2/28 3/5      MT, ROM 15' 15'      HEP        Exercise Log  3/5      UBC 10'-ROM 10'-ROM      FWC-Codman's 30x-No ABD 30x-No ABD      FWC-Curls,Tri        Power Web Yellow-30x       Digiflex Yellow-30x       Finger Ladder        Mando-BP,PD,Lats,Row        Trimax-BP        W/P-PNF,IR,ER,PU,PS,Throw-Top,Mid,Bot  2 5#-30x  No ABD      W/P-OH 10' 10'      ME, PE 15' 15'              Modalities 2/28 3/5      MH&ES 20' 20'      US  10'      CP&ES            PT Re-Evaluation     Today's date: 3/5/2019  Patient name: Nolberto Lares  : 1965  MRN: [id number]  Referring provider: Caitlin Callejas MD  Dx:   Encounter Diagnosis     ICD-10-CM    1  Aftercare Z51 89    2  Acute pain of left shoulder M25 512    3  Rotator cuff arthropathy of left shoulder M12 812    4  History of arthroscopic procedure on shoulder Z98 890      Assessment  Assessment details: Patient states she still feels weak  Patient states she still is not abducting or elevating her left shoulder  Impairments: abnormal or restricted ROM, abnormal movement, activity intolerance, impaired physical strength, pain with function, safety issue and scapular dyskinesis  Understanding of Dx/Px/POC: excellent  Goals  STG 2-4 weeks:   Increase UE strength by 3-6 lbs  Decrease pain by 1-2 levels on 1-10 pain scale  Increase UE PROM by 10-15 Degrees in all planes  Reduce C/O pain with lying on either side  Initiate HEP  LTG 6-8 weeks:   Increase UE strength 10-20 lbs  Demonstrate UE AROM WFL in all planes  Decrease pain to <2  Improve strength by 10-20 lbs  Return to lying on their right or left side with minimal difficulty  Improve sleep status limitations to none  D/C with HEP      Plan  Patient would benefit from: skilled physical therapy  Planned modality interventions: ultrasound and unattended electrical stimulation  Planned therapy interventions: joint mobilization, manual therapy, patient education, postural training, self care, strengthening, stretching, therapeutic activities, therapeutic exercise, flexibility, coordination and home exercise program  Frequency: 3x week  Duration in weeks: 6  Treatment plan discussed with: patient      Subjective Evaluation    Pain  Quality: discomfort, pulling, sharp, radiating, tight and squeezing  Relieving factors: heat, medications, change in position, ice, relaxation, rest and support  Aggravating factors: lifting and overhead activity  Progression: improved    Treatments  Current treatment: physical therapy  Patient Goals  Patient goals for therapy: decreased pain, increased motion, return to work, return to [employer] activities, independence with ADLs/IADLs and increased strength        Objective     Date of onset:  12/15/2018    Date of Surgery:  01/31/2019    History of Present Episode: 2/5/2019  Keara Benitez states she was pulling on the covers in bed and went to adjust the covers  She pulled hard and slipped  She started to roll out of bed unto the floor  She put her left hand out quickly and stopped herself from falling  She felt immediate pain in her left shoulder  She went for surgery the end of January  Past Medical History:    2/5/2019  Keara Benitez reports some allergies / general mold  Hypertension  Hypothyroidism  Previous Level of Functional Ability:  2/5/2019  Keara Benitez states no limitations or issues  No pain levels  Inspection / Palpation:  Shoulder:  2/5/2019  Mesomorphic body type  No signs of infection  No signs of wounds  No signs of drainage  No signs of ecchymotic regions  No signs of erythremic regions  Moderate signs of muscle spasm  Moderate signs of muscle guarding  Moderate signs of tenderness reported to palpation  Mild to moderate signs of swelling  Positive signs of a surgery site  Current conditions appears consistent with recent surgery on her left shoulder  Chief Complaints:  2/5/2019  Keara Benitez reports severe difficulty with bending her left shoulder    Keara Benitez reports severe difficulty with movement of her left shoulder  Jakob Back reports severe difficulty with use of her left arm  Jakob Back reports severe difficulty with lifting / elevating her left arm  Jakob Back reports moderate difficulty with sleeping  Jakob Back reports severe difficulty with her strength and endurance  Jakob Back reports severe limitations with her left shoulder range of motion  Jakob Back reports severe difficulty lying on her left shoulder region  Patient sleeps currently in a recliner      SHOULDER PAIN Resting Palpation Moving Lifting Elevating   2/5/2019 Rt 0 0 0 0 0   2/5/2019 Lt  3-5 3-5 8-10 8-10 10+   3/5/19 Lt 1-3 1-3 4-6 4-6 5-9     SHOULDER PAIN Sleeping Throwing Pushing Pulling Twisting   2/5/2019 Rt 0 0 0 0 0   2/5/2019 Lt  3-5 NA NA 8-10 8-10   3/5/19 Lt 1-3 NA 3-6 4-6 4-6     SHOULDER AROM Flexion Extension Abduction   2/5/2019 Rt 180° 65° 180°   2/5/2019 Lt 25° 10° 5°   3/5/19 Lt 125° 32° 35°     SHOULDER AROM Hor Add Ext Rotation Int Rotation   2/5/2019 Rt 70° 95° 95°   2/5/2019 Lt 5° 5° 82°   3/5/19 Lt 35° 26° 90°     SHLD MMT Flexion Extension Abduction   2/5/2019 Rt 0/10  28 lbs 0/10  29 lbs 0/10  27 lbs   2/5/2019 Lt 8/10  1 lbs 8/10  2 lbs 8/10  1 lbs   NT   3/5/19 Lt 4/10  6 lbs 4/10  7 lbs 8/10  1 lbs  NT     SHLD MMT Hor Add Ext Rotation Int Rotation   2/5/2019 Rt 0/10  31 lbs 0/10  30 lbs 0/10  32 lbs   2/5/2019 Lt 8/10  1 lbs 8/10  1 lbs  NT 8/10  1 lbs   3/5/19 Lt 4/10  6 lbs 4/10  1 lbs  NT 4/10  6 lbs     Precautions: Left Shoulder SX

## 2019-03-05 NOTE — PROGRESS NOTES
Today's date: 3/5/2019  Patient name: Rosmery Proctor  : 1965  MRN: [id number]  Referring provider: Tino Duran MD  Dx:   Encounter Diagnosis     ICD-10-CM    1  Aftercare Z51 89    2  Acute pain of left shoulder M25 512    3  Rotator cuff arthropathy of left shoulder M12 812    4  History of arthroscopic procedure on shoulder Z98 890      Subjective:  Javier James states her left shoulder is slowly feeling better  Her left shoulder is still very stiff and limited in movement  She feels her strength is also very low / limited  Objective: See treatment log below    Assessment: Tolerated treatment well  Patient exhibited good technique with therapeutic exercises and would benefit from continued PT    Plan: Continue per plan of care  Progress treatment as tolerated         Precautions: Left Shoulder SX    Daily Treatment Log    Manual  2/28 3/5      MT, ROM 15' 15'      HEP        Exercise Log 2/28 3/5      UBC 10'-ROM 10'-ROM      FWC-Codman's 30x-No ABD 30x-No ABD      FWC-Curls,Tri        Power Web Yellow-30x       Digiflex Yellow-30x       Finger Ladder        Mando-BP,PD,Lats,Row        Trimax-BP        W/P-PNF,IR,ER,PU,PS,Throw-Top,Mid,Bot  2 5#-30x  No ABD      W/P-OH 10' 10'      ME, PE 13' 15'              Modalities 2/28 3/5      MH&ES 20' 20'      US  10'      CP&ES

## 2019-03-06 ENCOUNTER — OFFICE VISIT (OUTPATIENT)
Dept: PHYSICAL THERAPY | Facility: CLINIC | Age: 54
End: 2019-03-06
Payer: COMMERCIAL

## 2019-03-06 DIAGNOSIS — Z98.890 HISTORY OF ARTHROSCOPIC PROCEDURE ON SHOULDER: ICD-10-CM

## 2019-03-06 DIAGNOSIS — Z51.89 AFTERCARE: Primary | ICD-10-CM

## 2019-03-06 DIAGNOSIS — M25.512 ACUTE PAIN OF LEFT SHOULDER: ICD-10-CM

## 2019-03-06 DIAGNOSIS — M12.812 ROTATOR CUFF ARTHROPATHY OF LEFT SHOULDER: ICD-10-CM

## 2019-03-06 PROCEDURE — 97140 MANUAL THERAPY 1/> REGIONS: CPT | Performed by: PHYSICAL THERAPIST

## 2019-03-06 PROCEDURE — 97110 THERAPEUTIC EXERCISES: CPT | Performed by: PHYSICAL THERAPIST

## 2019-03-06 PROCEDURE — 97035 APP MDLTY 1+ULTRASOUND EA 15: CPT | Performed by: PHYSICAL THERAPIST

## 2019-03-06 PROCEDURE — 97014 ELECTRIC STIMULATION THERAPY: CPT | Performed by: PHYSICAL THERAPIST

## 2019-03-06 NOTE — PROGRESS NOTES
Today's date: 3/6/2019  Patient name: Amol Woods  : 1965  MRN: [id number]  Referring provider: Gui Fountain MD  Dx:   Encounter Diagnosis     ICD-10-CM    1  Aftercare Z51 89    2  Acute pain of left shoulder M25 512    3  Rotator cuff arthropathy of left shoulder M12 812    4  History of arthroscopic procedure on shoulder Z98 890      Subjective:  Unknown Flirt states her left shoulder is sore today  She thinks she may have used it too much the past two days  Objective: See treatment log below    Assessment: Tolerated treatment well  Patient exhibited good technique with therapeutic exercises and would benefit from continued PT    Plan: Continue per plan of care  Progress treatment as tolerated         Precautions: Left Shoulder SX    Daily Treatment Log    Manual  2/28 3/5 3/6     MT, ROM 15' 15' 15'     HEP        Exercise Log 2/28 3/5 3/6     UBC 10'-ROM 10'-ROM 10'-ROM     FWC-Codman's 30x-No ABD 30x-No ABD 30x-No ABD     FWC-Curls,Tri        Power Web Yellow-30x       Digiflex Yellow-30x       Finger Ladder        Mando-BP,PD,Lats,Row        Trimax-BP        W/P-PNF,IR,ER,PU,PS,Throw-Top,Mid,Bot  2 5#-30x  No ABD 2 5#-30x No ABD     W/P-OH 10' 10' 10'     ME, PE 15' 15' 15'             Modalities 2/28 3/5 3/6     MH&ES 20' 20' 20'     US  10' 10'     CP&ES

## 2019-03-07 ENCOUNTER — APPOINTMENT (OUTPATIENT)
Dept: PHYSICAL THERAPY | Facility: CLINIC | Age: 54
End: 2019-03-07
Payer: COMMERCIAL

## 2019-03-11 ENCOUNTER — APPOINTMENT (OUTPATIENT)
Dept: PHYSICAL THERAPY | Facility: CLINIC | Age: 54
End: 2019-03-11
Payer: COMMERCIAL

## 2019-03-11 DIAGNOSIS — F41.9 ANXIETY: ICD-10-CM

## 2019-03-11 DIAGNOSIS — E11.9 TYPE 2 DIABETES MELLITUS WITHOUT COMPLICATION, WITHOUT LONG-TERM CURRENT USE OF INSULIN (HCC): ICD-10-CM

## 2019-03-12 ENCOUNTER — OFFICE VISIT (OUTPATIENT)
Dept: PHYSICAL THERAPY | Facility: CLINIC | Age: 54
End: 2019-03-12
Payer: COMMERCIAL

## 2019-03-12 DIAGNOSIS — M12.812 ROTATOR CUFF ARTHROPATHY OF LEFT SHOULDER: ICD-10-CM

## 2019-03-12 DIAGNOSIS — M25.512 ACUTE PAIN OF LEFT SHOULDER: ICD-10-CM

## 2019-03-12 DIAGNOSIS — Z98.890 HISTORY OF ARTHROSCOPIC PROCEDURE ON SHOULDER: ICD-10-CM

## 2019-03-12 DIAGNOSIS — Z51.89 AFTERCARE: Primary | ICD-10-CM

## 2019-03-12 PROCEDURE — 97014 ELECTRIC STIMULATION THERAPY: CPT | Performed by: PHYSICAL THERAPIST

## 2019-03-12 PROCEDURE — 97035 APP MDLTY 1+ULTRASOUND EA 15: CPT | Performed by: PHYSICAL THERAPIST

## 2019-03-12 PROCEDURE — 97110 THERAPEUTIC EXERCISES: CPT | Performed by: PHYSICAL THERAPIST

## 2019-03-12 PROCEDURE — 97140 MANUAL THERAPY 1/> REGIONS: CPT | Performed by: PHYSICAL THERAPIST

## 2019-03-12 NOTE — PROGRESS NOTES
Today's date: 3/12/2019  Patient name: Ramila Morales  : 1965  MRN: [id number]  Referring provider: Laila Valencia MD  Dx:   Encounter Diagnosis     ICD-10-CM    1  Aftercare Z51 89    2  Acute pain of left shoulder M25 512    3  Rotator cuff arthropathy of left shoulder M12 812    4  History of arthroscopic procedure on shoulder Z98 890      Subjective:  Emilia Ramirez states her left shoulder is feeling better  Patient thinks her superficial stitch has finally come out but she has a small hole now in her arm and it is tender and red  Objective: See treatment log below    Assessment: Tolerated treatment well  Patient exhibited good technique with therapeutic exercises and would benefit from continued PT    Plan: Continue per plan of care  Progress treatment as tolerated         Precautions: Left Shoulder SX    Daily Treatment Log  Manual  2/28 3/5 3/6 3/12    MT, ROM 15' 15' 15' 15'    HEP        Exercise Log 2/28 3/5 3/6 3/12    UBC 10'-ROM 10'-ROM 10'-ROM 10'-ROM    FWC-Codman's 30x-No ABD 30x-No ABD 30x-No ABD 30x-No Abd    FWC-Curls,Tri        Power Web Yellow-30x       Digiflex Yellow-30x       Finger Ladder        Mando-BP,PD,Lats,Row        Trimax-BP        W/P-PNF,IR,ER,PU,PS,Throw-Top,Mid,Bot  2 5#-30x  No ABD 2 5#-30x No ABD 2 5#-30x No ABD    W/P-OH 10' 10' 10' 10'    ME, PE 15' 15' 15' 15'            Modalities 2/28 3/5 3/6 3/12    MH&ES 20' 20' 20' 20'    US  10' 10' 10'    CP&ES

## 2019-03-13 ENCOUNTER — OFFICE VISIT (OUTPATIENT)
Dept: PHYSICAL THERAPY | Facility: CLINIC | Age: 54
End: 2019-03-13
Payer: COMMERCIAL

## 2019-03-13 DIAGNOSIS — Z51.89 AFTERCARE: Primary | ICD-10-CM

## 2019-03-13 DIAGNOSIS — M25.512 ACUTE PAIN OF LEFT SHOULDER: ICD-10-CM

## 2019-03-13 DIAGNOSIS — Z98.890 HISTORY OF ARTHROSCOPIC PROCEDURE ON SHOULDER: ICD-10-CM

## 2019-03-13 DIAGNOSIS — M12.812 ROTATOR CUFF ARTHROPATHY OF LEFT SHOULDER: ICD-10-CM

## 2019-03-13 DIAGNOSIS — E11.9 TYPE 2 DIABETES MELLITUS WITHOUT COMPLICATION, WITHOUT LONG-TERM CURRENT USE OF INSULIN (HCC): ICD-10-CM

## 2019-03-13 DIAGNOSIS — F41.9 ANXIETY: ICD-10-CM

## 2019-03-13 PROCEDURE — 97110 THERAPEUTIC EXERCISES: CPT | Performed by: PHYSICAL THERAPIST

## 2019-03-13 PROCEDURE — 97140 MANUAL THERAPY 1/> REGIONS: CPT | Performed by: PHYSICAL THERAPIST

## 2019-03-13 PROCEDURE — 97014 ELECTRIC STIMULATION THERAPY: CPT | Performed by: PHYSICAL THERAPIST

## 2019-03-13 PROCEDURE — 97035 APP MDLTY 1+ULTRASOUND EA 15: CPT | Performed by: PHYSICAL THERAPIST

## 2019-03-13 RX ORDER — FLUOXETINE HYDROCHLORIDE 20 MG/1
CAPSULE ORAL
Qty: 30 CAPSULE | Refills: 3 | Status: SHIPPED | OUTPATIENT
Start: 2019-03-13 | End: 2019-03-13 | Stop reason: SDUPTHER

## 2019-03-13 RX ORDER — METFORMIN HYDROCHLORIDE 500 MG/1
1000 TABLET, EXTENDED RELEASE ORAL
Qty: 180 TABLET | Refills: 3 | Status: SHIPPED | OUTPATIENT
Start: 2019-03-13 | End: 2020-04-15

## 2019-03-13 RX ORDER — FLUOXETINE HYDROCHLORIDE 20 MG/1
20 CAPSULE ORAL DAILY
Qty: 90 CAPSULE | Refills: 3 | Status: SHIPPED | OUTPATIENT
Start: 2019-03-13 | End: 2020-03-30

## 2019-03-13 RX ORDER — METFORMIN HYDROCHLORIDE 500 MG/1
TABLET, EXTENDED RELEASE ORAL
Qty: 60 TABLET | Refills: 3 | Status: SHIPPED | OUTPATIENT
Start: 2019-03-13 | End: 2019-03-13 | Stop reason: SDUPTHER

## 2019-03-13 NOTE — PROGRESS NOTES
Today's date: 3/13/2019  Patient name: Shari Viveros  : 1965  MRN: [id number]  Referring provider: Wolfgang Hernandez MD  Dx:   Encounter Diagnosis     ICD-10-CM    1  Aftercare Z51 89    2  Acute pain of left shoulder M25 512    3  Rotator cuff arthropathy of left shoulder M12 812    4  History of arthroscopic procedure on shoulder Z98 890      Subjective:  Etta Allen states her left shoulder is slowly feeling better  She is still tight and is limited in motion but she is gaining range of motion  Objective: See treatment log below    Assessment: Tolerated treatment well  Patient exhibited good technique with therapeutic exercises and would benefit from continued PT    Plan: Continue per plan of care  Progress treatment as tolerated         Precautions: Left Shoulder SX    Daily Treatment Log  Manual  2/28 3/5 3/6 3/12 3/13   MT, ROM 15' 15' 15' 15' 15'   HEP        Exercise Log 2/28 3/5 3/6 3/12 3/13   UBC 10'-ROM 10'-ROM 10'-ROM 10'-ROM 10'-ROM   FWC-Codman's 30x-No ABD 30x-No ABD 30x-No ABD 30x-No Abd 30x-No Abd   FWC-Curls,Tri        Power Web Yellow-30x       Digiflex Yellow-30x       Finger Ladder        Mando-BP,PD,Lats,Row        Trimax-BP        W/P-PNF,IR,ER,PU,PS,Throw-Top,Mid,Bot  2 5#-30x  No ABD 2 5#-30x No ABD 2 5#-30x No ABD 2 5#-30x No Abd   W/P-OH 10' 10' 10' 10' 10'   ME, PE 15' 15' 15' 15' 15'           Modalities 2/28 3/5 3/6 3/12 3/13   MH&ES 20' 20' 20' 20' 20'   US  10' 10' 10' 10'   CP&ES

## 2019-03-15 ENCOUNTER — OFFICE VISIT (OUTPATIENT)
Dept: PHYSICAL THERAPY | Facility: CLINIC | Age: 54
End: 2019-03-15
Payer: COMMERCIAL

## 2019-03-15 DIAGNOSIS — M25.512 ACUTE PAIN OF LEFT SHOULDER: ICD-10-CM

## 2019-03-15 DIAGNOSIS — M12.812 ROTATOR CUFF ARTHROPATHY OF LEFT SHOULDER: ICD-10-CM

## 2019-03-15 DIAGNOSIS — Z51.89 AFTERCARE: Primary | ICD-10-CM

## 2019-03-15 DIAGNOSIS — Z98.890 HISTORY OF ARTHROSCOPIC PROCEDURE ON SHOULDER: ICD-10-CM

## 2019-03-15 PROCEDURE — 97140 MANUAL THERAPY 1/> REGIONS: CPT | Performed by: PHYSICAL THERAPIST

## 2019-03-15 PROCEDURE — 97110 THERAPEUTIC EXERCISES: CPT | Performed by: PHYSICAL THERAPIST

## 2019-03-15 PROCEDURE — 97014 ELECTRIC STIMULATION THERAPY: CPT | Performed by: PHYSICAL THERAPIST

## 2019-03-15 PROCEDURE — 97035 APP MDLTY 1+ULTRASOUND EA 15: CPT | Performed by: PHYSICAL THERAPIST

## 2019-03-15 NOTE — PROGRESS NOTES
Today's date: 3/15/2019  Patient name: Gracy Starkey  : 1965  MRN: [id number]  Referring provider: Kelsey Simpson MD  Dx:   Encounter Diagnosis     ICD-10-CM    1  Aftercare Z51 89    2  Acute pain of left shoulder M25 512    3  Rotator cuff arthropathy of left shoulder M12 812    4  History of arthroscopic procedure on shoulder Z98 890      Subjective:  Kailey Elias states her left shoulder is slowly feeling better  Still hs some days worse than others but in general she is progressing  Objective: See treatment log below    Assessment: Tolerated treatment well  Patient exhibited good technique with therapeutic exercises and would benefit from continued PT    Plan: Continue per plan of care  Progress treatment as tolerated         Precautions: Left Shoulder SX    Daily Treatment Log  Manual  3/15       MT, ROM 15'       HEP        Exercise Log 3/15       UBC 10'-ROM       FWC-Codman's 30x-No ABD       FWC-Curls,Tri        Power Web        Digiflex        Finger Ladder        Mando-BP,PD,Lats,Row        Trimax-BP        W/P-PNF,IR,ER,PU,PS,Throw-Top,Mid,Bot 2 5#-30x No Abd       W/P-OH 10'       ME, PE 15'               Modalities 3/15       MH&ES 20'       US 10'       CP&ES

## 2019-03-19 ENCOUNTER — OFFICE VISIT (OUTPATIENT)
Dept: PHYSICAL THERAPY | Facility: CLINIC | Age: 54
End: 2019-03-19
Payer: COMMERCIAL

## 2019-03-19 DIAGNOSIS — M12.812 ROTATOR CUFF ARTHROPATHY OF LEFT SHOULDER: ICD-10-CM

## 2019-03-19 DIAGNOSIS — Z98.890 HISTORY OF ARTHROSCOPIC PROCEDURE ON SHOULDER: ICD-10-CM

## 2019-03-19 DIAGNOSIS — Z51.89 AFTERCARE: Primary | ICD-10-CM

## 2019-03-19 DIAGNOSIS — M25.512 ACUTE PAIN OF LEFT SHOULDER: ICD-10-CM

## 2019-03-19 PROCEDURE — 97110 THERAPEUTIC EXERCISES: CPT | Performed by: PHYSICAL THERAPIST

## 2019-03-19 PROCEDURE — 97014 ELECTRIC STIMULATION THERAPY: CPT | Performed by: PHYSICAL THERAPIST

## 2019-03-19 PROCEDURE — 97035 APP MDLTY 1+ULTRASOUND EA 15: CPT | Performed by: PHYSICAL THERAPIST

## 2019-03-19 PROCEDURE — 97140 MANUAL THERAPY 1/> REGIONS: CPT | Performed by: PHYSICAL THERAPIST

## 2019-03-19 NOTE — PROGRESS NOTES
Today's date: 3/19/2019  Patient name: Nadir Pool  : 1965  MRN: [id number]  Referring provider: Errol Rowe MD  Dx:   Encounter Diagnosis     ICD-10-CM    1  Aftercare Z51 89    2  Acute pain of left shoulder M25 512    3  Rotator cuff arthropathy of left shoulder M12 812    4  History of arthroscopic procedure on shoulder Z98 890      Subjective:  Abbey Rivera states her left shoulder is feeling better  Objective: See treatment log below    Assessment: Tolerated treatment well  Patient exhibited good technique with therapeutic exercises and would benefit from continued PT    Plan: Continue per plan of care  Progress treatment as tolerated         Precautions: Left Shoulder SX    Daily Treatment Log  Manual  3/15 3/19      MT, ROM 15' 15'      HEP        Exercise Log 3/15 3/19      UBC 10'-ROM 10'-ROM      FWC-Codman's 30x-No ABD 30x No Abd      FWC-Curls,Tri        Power Web        Digiflex        Finger Ladder        Mando-BP,PD,Lats,Row        Trimax-BP        W/P-PNF,IR,ER,PU,PS,Throw-Top,Mid,Bot 2 5#-30x No Abd 2 5# - 30x No Abd      W/P-OH 10' 10'      ME, PE 15' 15'              Modalities 3/15 3/19      MH&ES 20' 20'      US 10' 10'      CP&ES

## 2019-03-20 ENCOUNTER — OFFICE VISIT (OUTPATIENT)
Dept: PHYSICAL THERAPY | Facility: CLINIC | Age: 54
End: 2019-03-20
Payer: COMMERCIAL

## 2019-03-20 DIAGNOSIS — M25.512 ACUTE PAIN OF LEFT SHOULDER: ICD-10-CM

## 2019-03-20 DIAGNOSIS — Z51.89 AFTERCARE: Primary | ICD-10-CM

## 2019-03-20 DIAGNOSIS — M12.812 ROTATOR CUFF ARTHROPATHY OF LEFT SHOULDER: ICD-10-CM

## 2019-03-20 DIAGNOSIS — Z98.890 HISTORY OF ARTHROSCOPIC PROCEDURE ON SHOULDER: ICD-10-CM

## 2019-03-20 PROCEDURE — 97014 ELECTRIC STIMULATION THERAPY: CPT

## 2019-03-20 PROCEDURE — 97110 THERAPEUTIC EXERCISES: CPT

## 2019-03-20 PROCEDURE — 97140 MANUAL THERAPY 1/> REGIONS: CPT

## 2019-03-20 NOTE — PROGRESS NOTES
Today's date: 3/20/2019  Patient name: Inder Dunn  : 1965  MRN: [id number]  Referring provider: Marcelina Gan MD  Dx:   Encounter Diagnosis     ICD-10-CM    1  Aftercare Z51 89    2  Acute pain of left shoulder M25 512    3  Rotator cuff arthropathy of left shoulder M12 812    4  History of arthroscopic procedure on shoulder Z98 890      Subjective:  No new c/o pain today  Objective: See treatment log below    Assessment: Tolerated treatment well  Patient exhibited good technique with therapeutic exercises and would benefit from continued PT to increase L shoulder strength/ROM and endurance  Plan: Continue per plan of care  Progress treatment as tolerated         Manual  3/15 3/19 3/20     MT, ROM 15' 15' 30'     HEP        Exercise Log 3/15 3/19 3/20     UBC 10'-ROM 10'-ROM 10'     FWC-Codman's 30x-No ABD 30x No Abd 30x No ABD     FWC-Curls,Tri        Power Web        Digiflex        Finger Ladder        Mando-BP,PD,Lats,Row        Trimax-BP        W/P-PNF,IR,ER,PU,PS,Throw-Top,Mid,Bot 2 5#-30x No Abd 2 5# - 30x No Abd 2 5# 30x  No ABD     W/P-OH 10' 10' 10'     ME, PE 15' 15' 15'             Modalities 3/15 3/19 3/20     MH&ES 20' 20' 20'     US 10' 10' NT     CP&ES

## 2019-03-21 ENCOUNTER — APPOINTMENT (OUTPATIENT)
Dept: PHYSICAL THERAPY | Facility: CLINIC | Age: 54
End: 2019-03-21
Payer: COMMERCIAL

## 2019-03-23 ENCOUNTER — OFFICE VISIT (OUTPATIENT)
Dept: URGENT CARE | Facility: CLINIC | Age: 54
End: 2019-03-23
Payer: COMMERCIAL

## 2019-03-23 VITALS
SYSTOLIC BLOOD PRESSURE: 134 MMHG | WEIGHT: 178 LBS | TEMPERATURE: 98.6 F | HEIGHT: 64 IN | DIASTOLIC BLOOD PRESSURE: 83 MMHG | OXYGEN SATURATION: 97 % | HEART RATE: 81 BPM | RESPIRATION RATE: 16 BRPM | BODY MASS INDEX: 30.39 KG/M2

## 2019-03-23 DIAGNOSIS — N39.0 URINARY TRACT INFECTION WITHOUT HEMATURIA, SITE UNSPECIFIED: Primary | ICD-10-CM

## 2019-03-23 LAB
SL AMB  POCT GLUCOSE, UA: ABNORMAL
SL AMB LEUKOCYTE ESTERASE,UA: ABNORMAL
SL AMB POCT BILIRUBIN,UA: ABNORMAL
SL AMB POCT BLOOD,UA: ABNORMAL
SL AMB POCT CLARITY,UA: ABNORMAL
SL AMB POCT COLOR,UA: YELLOW
SL AMB POCT KETONES,UA: ABNORMAL
SL AMB POCT NITRITE,UA: ABNORMAL
SL AMB POCT PH,UA: 5
SL AMB POCT SPECIFIC GRAVITY,UA: 1
SL AMB POCT URINE PROTEIN: ABNORMAL
SL AMB POCT UROBILINOGEN: ABNORMAL

## 2019-03-23 PROCEDURE — G0382 LEV 3 HOSP TYPE B ED VISIT: HCPCS | Performed by: PHYSICIAN ASSISTANT

## 2019-03-23 PROCEDURE — 87086 URINE CULTURE/COLONY COUNT: CPT | Performed by: PHYSICIAN ASSISTANT

## 2019-03-23 PROCEDURE — 81002 URINALYSIS NONAUTO W/O SCOPE: CPT | Performed by: PHYSICIAN ASSISTANT

## 2019-03-23 RX ORDER — CIPROFLOXACIN 500 MG/1
500 TABLET, FILM COATED ORAL EVERY 12 HOURS SCHEDULED
Qty: 10 TABLET | Refills: 0 | Status: SHIPPED | OUTPATIENT
Start: 2019-03-23 | End: 2019-03-28

## 2019-03-23 RX ORDER — LEVOTHYROXINE SODIUM 0.2 MG/1
225 TABLET ORAL
COMMUNITY

## 2019-03-23 RX ORDER — MONTELUKAST SODIUM 10 MG/1
TABLET ORAL
COMMUNITY

## 2019-03-23 RX ORDER — CHLORTHALIDONE 25 MG/1
25 TABLET ORAL
COMMUNITY

## 2019-03-23 NOTE — PROGRESS NOTES
[facility]        NAME: Rosmery Proctor is a 46 y o  female  : 1965    MRN: [id number]  DATE: 2019  TIME: 12:26 PM    Assessment and Plan   Urinary tract infection without hematuria, site unspecified [N39 0]  1  Urinary tract infection without hematuria, site unspecified  POCT urine dip    ciprofloxacin (CIPRO) 500 mg tablet    Urine culture         Patient Instructions     Take medicine as prescribed  Follow up with PCP in 3-5 days  Proceed to  ER if symptoms worsen  Chief Complaint     Chief Complaint   Patient presents with    Possible UTI     symptoms began 2 days ago         History of Present Illness       Urinary Tract Infection    This is a new problem  The current episode started yesterday  The problem occurs every urination  The problem has been gradually worsening  The pain is moderate  There has been no fever  Associated symptoms include frequency and urgency  Pertinent negatives include no chills, discharge, flank pain, hematuria, hesitancy, nausea, possible pregnancy, sweats or vomiting  She has tried increased fluids for the symptoms  The treatment provided no relief  There is no history of catheterization, kidney stones, recurrent UTIs, a single kidney, urinary stasis or a urological procedure  Review of Systems   Review of Systems   Constitutional: Negative for chills  Gastrointestinal: Negative for nausea and vomiting  Genitourinary: Positive for frequency and urgency  Negative for flank pain, hematuria and hesitancy           Current Medications       Current Outpatient Medications:     chlorthalidone 25 mg tablet, Take 25 mg by mouth, Disp: , Rfl:     levothyroxine 200 mcg tablet, Take 200 mcg by mouth, Disp: , Rfl:     montelukast (SINGULAIR) 10 mg tablet, Take by mouth, Disp: , Rfl:     ciprofloxacin (CIPRO) 500 mg tablet, Take 1 tablet (500 mg total) by mouth every 12 (twelve) hours for 5 days, Disp: 10 tablet, Rfl: 0    Current Allergies Allergies as of 03/23/2019 - Reviewed 03/23/2019   Allergen Reaction Noted    Molds & smuts  02/05/2019    Sulfa antibiotics Rash 03/23/2019            The following portions of the patient's history were reviewed and updated as appropriate: allergies, current medications, past family history, past medical history, past social history, past surgical history and problem list      Past Medical History:   Diagnosis Date    Hypertension     Hypothyroid        Past Surgical History:   Procedure Laterality Date    ENDOMETRIAL ABLATION      FOOT SURGERY Bilateral     KNEE SURGERY Left     ROTATOR CUFF REPAIR Left        No family history on file  Medications have been verified  Objective   /83   Pulse 81   Temp 98 6 °F (37 °C) (Tympanic)   Resp 16   Ht 5' 4" (1 626 m)   Wt 80 7 kg (178 lb)   SpO2 97%   BMI 30 55 kg/m²        Physical Exam     Physical Exam   Constitutional: She appears well-developed and well-nourished  Cardiovascular: Normal rate, regular rhythm and normal heart sounds  Exam reveals no gallop and no friction rub  No murmur heard  Pulmonary/Chest: Effort normal and breath sounds normal  No stridor  No respiratory distress  She has no wheezes  She has no rales  Abdominal: Soft  Bowel sounds are normal  She exhibits no distension and no mass  There is tenderness in the suprapubic area  There is no rigidity, no rebound, no guarding and no CVA tenderness

## 2019-03-25 ENCOUNTER — OFFICE VISIT (OUTPATIENT)
Dept: PHYSICAL THERAPY | Facility: CLINIC | Age: 54
End: 2019-03-25
Payer: COMMERCIAL

## 2019-03-25 DIAGNOSIS — Z51.89 AFTERCARE: Primary | ICD-10-CM

## 2019-03-25 DIAGNOSIS — Z98.890 HISTORY OF ARTHROSCOPIC PROCEDURE ON SHOULDER: ICD-10-CM

## 2019-03-25 DIAGNOSIS — M25.512 ACUTE PAIN OF LEFT SHOULDER: ICD-10-CM

## 2019-03-25 DIAGNOSIS — M12.812 ROTATOR CUFF ARTHROPATHY OF LEFT SHOULDER: ICD-10-CM

## 2019-03-25 LAB — BACTERIA UR CULT: NORMAL

## 2019-03-25 PROCEDURE — 97110 THERAPEUTIC EXERCISES: CPT | Performed by: PHYSICAL THERAPIST

## 2019-03-25 PROCEDURE — 97014 ELECTRIC STIMULATION THERAPY: CPT | Performed by: PHYSICAL THERAPIST

## 2019-03-25 PROCEDURE — 97140 MANUAL THERAPY 1/> REGIONS: CPT | Performed by: PHYSICAL THERAPIST

## 2019-03-25 PROCEDURE — 97035 APP MDLTY 1+ULTRASOUND EA 15: CPT | Performed by: PHYSICAL THERAPIST

## 2019-03-25 NOTE — PROGRESS NOTES
Today's date: 3/25/2019  Patient name: Maria R Lua  : 1965  MRN: [id number]  Referring provider: Hola Reyes MD  Dx:   Encounter Diagnosis     ICD-10-CM    1  Aftercare Z51 89    2  Acute pain of left shoulder M25 512    3  Rotator cuff arthropathy of left shoulder M12 812    4  History of arthroscopic procedure on shoulder Z98 890      Subjective:  Jospsonal [place] states her left shoulder is slowly feeling better  She is having pressure from work to not receive therapy but I supported her on her needs and hopefully her work situation will improve  Objective: See treatment log below    Assessment: Tolerated treatment well  Patient exhibited good technique with therapeutic exercises and would benefit from continued PT    Plan: Continue per plan of care  Progress treatment as tolerated         Manual  3/15 3/19 3/20 3/25    MT, ROM 15' 15' 30' 15'    HEP        Exercise Log 3/15 3/19 3/20 3/25    UBC 10'-ROM 10'-ROM 10' 10'    FWC-Codman's 30x-No ABD 30x No Abd 30x No ABD 30x-No abd    FWC-Curls,Tri        Power Web        Digiflex        Finger Ladder        Mando-BP,PD,Lats,Row        Trimax-BP        W/P-PNF,IR,ER,PU,PS,Throw-Top,Mid,Bot 2 5#-30x No Abd 2 5# - 30x No Abd 2 5# 30x  No ABD 2 5#-30x No abd    W/P-OH 10' 10' 10' 00'    ME, PE 15' 15' 15' 15'            Modalities 3/15 3/19 3/20 3/25    MH&ES 20' 20' 20' 20'    US 10' 10' NT 10'    CP&ES

## 2019-03-27 ENCOUNTER — OFFICE VISIT (OUTPATIENT)
Dept: PHYSICAL THERAPY | Facility: CLINIC | Age: 54
End: 2019-03-27
Payer: COMMERCIAL

## 2019-03-27 DIAGNOSIS — M12.812 ROTATOR CUFF ARTHROPATHY OF LEFT SHOULDER: ICD-10-CM

## 2019-03-27 DIAGNOSIS — M25.512 ACUTE PAIN OF LEFT SHOULDER: ICD-10-CM

## 2019-03-27 DIAGNOSIS — Z98.890 HISTORY OF ARTHROSCOPIC PROCEDURE ON SHOULDER: ICD-10-CM

## 2019-03-27 DIAGNOSIS — Z51.89 AFTERCARE: Primary | ICD-10-CM

## 2019-03-27 PROCEDURE — 97110 THERAPEUTIC EXERCISES: CPT | Performed by: PHYSICAL THERAPIST

## 2019-03-27 PROCEDURE — 97014 ELECTRIC STIMULATION THERAPY: CPT | Performed by: PHYSICAL THERAPIST

## 2019-03-27 PROCEDURE — 97140 MANUAL THERAPY 1/> REGIONS: CPT | Performed by: PHYSICAL THERAPIST

## 2019-03-27 RX ORDER — LISINOPRIL 20 MG/1
TABLET ORAL
Qty: 30 TABLET | Refills: 5 | Status: SHIPPED | OUTPATIENT
Start: 2019-03-27 | End: 2019-09-25 | Stop reason: SDUPTHER

## 2019-03-27 NOTE — PROGRESS NOTES
Today's date: 3/27/2019  Patient name: Shari Viveros  : 1965  MRN: [id number]  Referring provider: Wolfgang Hernandez MD  Dx:   Encounter Diagnosis     ICD-10-CM    1  Aftercare Z51 89    2  Acute pain of left shoulder M25 512    3  Rotator cuff arthropathy of left shoulder M12 812    4  History of arthroscopic procedure on shoulder Z98 890      Subjective:  Etta Allen states her left shoulder is  Little more sore today  She is still careful to not lift / abduct her shoulder  Objective: See treatment log below    Assessment: Tolerated treatment well  Patient exhibited good technique with therapeutic exercises and would benefit from continued PT    Plan: Continue per plan of care  Progress treatment as tolerated         Manual  3/15 3/19 3/20 3/25 3/27   MT, ROM 15' 15' 30' 15' 15'   HEP        Exercise Log 3/15 3/19 3/20 3/25 3/27   UBC 10'-ROM 10'-ROM 10' 10'    FWC-Codman's 30x-No ABD 30x No Abd 30x No ABD 30x-No abd 30x-30x-No abd   FWC-Curls,Tri        Power Web        Digiflex        Finger Ladder        Mando-BP,PD,Lats,Row        Trimax-BP        W/P-PNF,IR,ER,PU,PS,Throw-Top,Mid,Bot 2 5#-30x No Abd 2 5# - 30x No Abd 2 5# 30x  No ABD 2 5#-30x No abd 2 5#-30x No abd   W/P-OH 10' 10' 10' 10' 10'   ME, PE 15' 15' 15' 15' 15'           Modalities 3/15 3/19 3/20 3/25 3/27   MH&ES 20' 20' 20' 20' 20'   US 10' 10' NT 10'    CP&ES

## 2019-03-28 ENCOUNTER — APPOINTMENT (OUTPATIENT)
Dept: PHYSICAL THERAPY | Facility: CLINIC | Age: 54
End: 2019-03-28
Payer: COMMERCIAL

## 2019-04-01 ENCOUNTER — OFFICE VISIT (OUTPATIENT)
Dept: PHYSICAL THERAPY | Facility: CLINIC | Age: 54
End: 2019-04-01
Payer: COMMERCIAL

## 2019-04-01 DIAGNOSIS — M12.812 ROTATOR CUFF ARTHROPATHY OF LEFT SHOULDER: ICD-10-CM

## 2019-04-01 DIAGNOSIS — M25.512 ACUTE PAIN OF LEFT SHOULDER: ICD-10-CM

## 2019-04-01 DIAGNOSIS — Z98.890 HISTORY OF ARTHROSCOPIC PROCEDURE ON SHOULDER: ICD-10-CM

## 2019-04-01 DIAGNOSIS — Z51.89 AFTERCARE: Primary | ICD-10-CM

## 2019-04-01 LAB
AGE FOR GFR: 54
ALT SERPL-CCNC: 24 UNITS/L (ref 9–52)
ANION GAP SERPL CALCULATED.3IONS-SCNC: 10 MMOL/L
AST SERPL-CCNC: 20 UNITS/L (ref 14–36)
BUN BLDV-MCNC: 11 MG/DL (ref 7–17)
CALCIUM SERPL-MCNC: 9.5 MG/DL (ref 8.4–10.2)
CHLORIDE BLD-SCNC: 105 MMOL/L (ref 98–120)
CHOLESTEROL/HDL RATIO: 4.2 RATIO (ref 0–4.5)
CHOLESTEROL: 225 MG/DL (ref 50–200)
CO2: 30 MMOL/L (ref 22–31)
CREAT SERPL-MCNC: 0.6 MG/DL (ref 0.5–1)
EGFR BF: 126 ML/MIN/1.73 M2
EGFR BM: 170 ML/MIN/1.73 M2
EGFR WF: 104 ML/MIN/1.73 M2
EGFR WM: 140 ML/MIN/1.73 M2
GLUCOSE: 108 MG/DL (ref 65–105)
HDL, DIRECT: 54 MG/DL (ref 36–68)
HEPATITIS C IGG: NORMAL
LDL CHOLESTEROL CALCULATED: 150.2 MG/DL (ref 0–160)
POTASSIUM SERPL-SCNC: 4.2 MMOL/L (ref 3.6–5)
SIGNAL/CUTOFF: NORMAL
SODIUM BLD-SCNC: 141 MMOL/L (ref 135–145)
TRIGL SERPL-MCNC: 104 MG/DL (ref 10–250)
VLDLC SERPL CALC-MCNC: 21 MG/DL (ref 0–40)

## 2019-04-01 PROCEDURE — 97164 PT RE-EVAL EST PLAN CARE: CPT | Performed by: PHYSICAL THERAPIST

## 2019-04-01 PROCEDURE — 97140 MANUAL THERAPY 1/> REGIONS: CPT | Performed by: PHYSICAL THERAPIST

## 2019-04-01 PROCEDURE — 97110 THERAPEUTIC EXERCISES: CPT | Performed by: PHYSICAL THERAPIST

## 2019-04-01 PROCEDURE — 97014 ELECTRIC STIMULATION THERAPY: CPT | Performed by: PHYSICAL THERAPIST

## 2019-04-02 ENCOUNTER — APPOINTMENT (OUTPATIENT)
Dept: PHYSICAL THERAPY | Facility: CLINIC | Age: 54
End: 2019-04-02
Payer: COMMERCIAL

## 2019-04-03 ENCOUNTER — APPOINTMENT (OUTPATIENT)
Dept: PHYSICAL THERAPY | Facility: CLINIC | Age: 54
End: 2019-04-03
Payer: COMMERCIAL

## 2019-04-08 ENCOUNTER — TRANSCRIBE ORDERS (OUTPATIENT)
Dept: PHYSICAL THERAPY | Facility: CLINIC | Age: 54
End: 2019-04-08

## 2019-04-08 DIAGNOSIS — M12.812 ROTATOR CUFF ARTHROPATHY OF LEFT SHOULDER: ICD-10-CM

## 2019-04-08 DIAGNOSIS — Z98.890 HISTORY OF ARTHROSCOPIC PROCEDURE ON SHOULDER: ICD-10-CM

## 2019-04-08 DIAGNOSIS — Z51.89 AFTERCARE: Primary | ICD-10-CM

## 2019-04-08 DIAGNOSIS — M25.512 ACUTE PAIN OF LEFT SHOULDER: ICD-10-CM

## 2019-04-10 ENCOUNTER — OFFICE VISIT (OUTPATIENT)
Dept: FAMILY MEDICINE CLINIC | Age: 54
End: 2019-04-10
Payer: COMMERCIAL

## 2019-04-10 VITALS
WEIGHT: 236 LBS | BODY MASS INDEX: 40.51 KG/M2 | HEART RATE: 87 BPM | DIASTOLIC BLOOD PRESSURE: 84 MMHG | OXYGEN SATURATION: 96 % | SYSTOLIC BLOOD PRESSURE: 118 MMHG

## 2019-04-10 DIAGNOSIS — I10 ESSENTIAL HYPERTENSION: ICD-10-CM

## 2019-04-10 DIAGNOSIS — E11.9 TYPE 2 DIABETES MELLITUS WITHOUT COMPLICATION, WITHOUT LONG-TERM CURRENT USE OF INSULIN (HCC): ICD-10-CM

## 2019-04-10 DIAGNOSIS — E78.2 MIXED HYPERLIPIDEMIA: Primary | ICD-10-CM

## 2019-04-10 PROCEDURE — 99214 OFFICE O/P EST MOD 30 MIN: CPT | Performed by: FAMILY MEDICINE

## 2019-04-10 RX ORDER — ATORVASTATIN CALCIUM 20 MG/1
20 TABLET, FILM COATED ORAL DAILY
Qty: 30 TABLET | Refills: 5 | Status: SHIPPED | OUTPATIENT
Start: 2019-04-10 | End: 2019-10-09 | Stop reason: SDUPTHER

## 2019-04-10 ASSESSMENT — PATIENT HEALTH QUESTIONNAIRE - PHQ9
1. LITTLE INTEREST OR PLEASURE IN DOING THINGS: 0
SUM OF ALL RESPONSES TO PHQ9 QUESTIONS 1 & 2: 0
SUM OF ALL RESPONSES TO PHQ QUESTIONS 1-9: 0
2. FEELING DOWN, DEPRESSED OR HOPELESS: 0
SUM OF ALL RESPONSES TO PHQ QUESTIONS 1-9: 0

## 2019-04-10 ASSESSMENT — ENCOUNTER SYMPTOMS
SHORTNESS OF BREATH: 0
COUGH: 0
WHEEZING: 0
CONSTIPATION: 0
DIARRHEA: 0
ABDOMINAL PAIN: 0

## 2019-04-10 NOTE — PROGRESS NOTES
Review of Systems   Respiratory: Negative for cough, shortness of breath and wheezing. Cardiovascular: Negative for chest pain, palpitations and leg swelling. Gastrointestinal: Negative for abdominal pain, constipation and diarrhea. Endocrine: Negative for polydipsia. Genitourinary: Negative for frequency and urgency. Neurological: Negative for dizziness and headaches. Denied wanting the tetanus shot.

## 2019-04-10 NOTE — PROGRESS NOTES
[address] [place] [address] [facility] [place]  [address] [place] [address]  Dept: [phone]  Dept [facility]:[phone]    Dmitriy Dhillon is a 46 y.o. female who presents today for her medical conditions/complaints as notedbelow. Dmitriy Dhillon is c/o of Diabetes (had labs done on 4/1)      HPI:     HPI  Has been walking outside with the dog in the morning. Is usually walking about 1.5 miles. Does take the metformin in morning both tabs, does usually have 1 episode of diarrhea daily in the morning usually. Has been watching her diet. Did meet with Priscila Gagnon diabetic educator and this has been helpful. Has been watching her diet more regularly. BS has been just above 100 most days. No issues with the BP medications. Tolerating the lisinopril fine, no CP, no SOB, no N/V. No upset stomach. Has not been on cholesterol meds as of yet.   Did have her HgA1c 2 months ago and this was much improved, not yet at goal but almost at 7.4      BP Readings from Last 3 Encounters:   04/10/19 118/84   03/05/19 130/86   02/05/19 122/64          (goal 120/80)    Wt Readings from Last 3 Encounters:   04/10/19 236 lb (107 kg)   03/05/19 243 lb 11.2 oz (110.5 kg)   02/05/19 258 lb (117 kg)         Past Medical History:   Diagnosis Date    History of bulimia     in 20 and 30's    Hyperlipidemia     Hypertension     Type 2 diabetes mellitus without complication, without long-term current use of insulin ([facility] [address] 7/29/2018      Past Surgical History:   Procedure Laterality Date    ANKLE SURGERY      scope    CERVICAL POLYP REMOVAL  01/25/2017    CHOLECYSTECTOMY      DILATION AND CURETTAGE OF UTERUS  01/2017    due to menorrhagia    TUBAL LIGATION         Family History   Problem Relation Age of Onset    Arthritis Mother     Cancer Mother         endometrial       Social History     Tobacco Use    Smoking status: Never Smoker    Smokeless tobacco: Never Used   Substance Use Topics    Alcohol use: No      Current Outpatient Medications   Medication Sig Dispense Refill    atorvastatin (LIPITOR) 20 MG tablet Take 1 tablet by mouth daily 30 tablet 5    lisinopril (PRINIVIL;ZESTRIL) 20 MG tablet TAKE 1 TABLET BY MOUTH ONCE DAILY 30 tablet 5    FLUoxetine (PROZAC) 20 MG capsule Take 1 capsule by mouth daily 90 capsule 3    metFORMIN (GLUCOPHAGE-XR) 500 MG extended release tablet Take 2 tablets by mouth daily (with breakfast) 180 tablet 3    glucose blood VI test strips (ASCENSIA AUTODISC VI;ONE TOUCH ULTRA TEST VI) strip 1 each by In Vitro route daily As needed. 100 each 3    Lancets MISC Check sugars daily 100 each 3     No current facility-administered medications for this visit. No Known Allergies    Health Maintenance   Topic Date Due    HIV screen  12/31/1979    Hepatitis B Vaccine (1 of 3 - Risk 3-dose series) 12/31/1983    DTaP/Tdap/Td vaccine (1 - Tdap) 12/31/1983    Shingles Vaccine (1 of 2) 12/31/2014    Colon cancer screen colonoscopy  12/31/2014    Breast cancer screen  11/12/2015    Diabetic microalbuminuria test  04/16/2019    Diabetic foot exam  11/12/2019    Cervical cancer screen  01/25/2020    A1C test (Diabetic or Prediabetic)  02/05/2020    Diabetic retinal exam  02/05/2020    Lipid screen  04/01/2020    Potassium monitoring  04/01/2020    Creatinine monitoring  04/01/2020    Flu vaccine  Completed    Pneumococcal 0-64 years Vaccine  Completed    Hepatitis C screen  Completed       Subjective:      Review of Systems    Objective:     /84   Pulse 87   Wt 236 lb (107 kg)   SpO2 96%   BMI 40.51 kg/m²     Physical Exam   Constitutional: She is oriented to person, place, and time. She appears well-developed and well-nourished. HENT:   Head: Normocephalic and atraumatic. Eyes: Conjunctivae and EOM are normal.   Neck: Normal range of motion. Neck supple. No JVD present. No thyromegaly present.    Cardiovascular: Normal rate, regular rhythm and intact distal Discussed use, benefit, and side effects of prescribed medications. All patient questions answered. Pt voiced understanding. Reviewed health maintenance. Instructed to continue current medications, diet andexercise. Patient agreed with treatment plan. Follow up as directed.      Electronically signed by Rogerio Medeiros MD on 4/10/2019

## 2019-05-14 ENCOUNTER — TRANSCRIBE ORDERS (OUTPATIENT)
Dept: PHYSICAL THERAPY | Facility: CLINIC | Age: 54
End: 2019-05-14

## 2019-05-14 DIAGNOSIS — M25.512 ACUTE PAIN OF LEFT SHOULDER: ICD-10-CM

## 2019-05-14 DIAGNOSIS — Z51.89 AFTER CARE: Primary | ICD-10-CM

## 2019-05-14 DIAGNOSIS — M12.812 ROTATOR CUFF ARTHROPATHY OF LEFT SHOULDER: ICD-10-CM

## 2019-06-04 ENCOUNTER — OFFICE VISIT (OUTPATIENT)
Dept: FAMILY MEDICINE CLINIC | Age: 54
End: 2019-06-04
Payer: COMMERCIAL

## 2019-06-04 VITALS
WEIGHT: 219 LBS | SYSTOLIC BLOOD PRESSURE: 112 MMHG | RESPIRATION RATE: 12 BRPM | DIASTOLIC BLOOD PRESSURE: 60 MMHG | HEART RATE: 72 BPM | BODY MASS INDEX: 37.59 KG/M2

## 2019-06-04 DIAGNOSIS — I10 ESSENTIAL HYPERTENSION: ICD-10-CM

## 2019-06-04 DIAGNOSIS — Z71.89 DIABETES EDUCATION, ENCOUNTER FOR: ICD-10-CM

## 2019-06-04 DIAGNOSIS — E78.2 MIXED HYPERLIPIDEMIA: ICD-10-CM

## 2019-06-04 DIAGNOSIS — E66.01 CLASS 2 SEVERE OBESITY DUE TO EXCESS CALORIES WITH SERIOUS COMORBIDITY AND BODY MASS INDEX (BMI) OF 37.0 TO 37.9 IN ADULT (HCC): ICD-10-CM

## 2019-06-04 DIAGNOSIS — E11.9 TYPE 2 DIABETES MELLITUS WITHOUT COMPLICATION, WITHOUT LONG-TERM CURRENT USE OF INSULIN (HCC): Primary | ICD-10-CM

## 2019-06-04 LAB — HBA1C MFR BLD: 5.8 %

## 2019-06-04 PROCEDURE — 83036 HEMOGLOBIN GLYCOSYLATED A1C: CPT | Performed by: NURSE PRACTITIONER

## 2019-06-04 PROCEDURE — 99214 OFFICE O/P EST MOD 30 MIN: CPT | Performed by: NURSE PRACTITIONER

## 2019-06-04 ASSESSMENT — ENCOUNTER SYMPTOMS
SHORTNESS OF BREATH: 0
ABDOMINAL PAIN: 0
RESPIRATORY NEGATIVE: 1
DIARRHEA: 0
BLURRED VISION: 0

## 2019-06-04 NOTE — PROGRESS NOTES
[address] [facility] INTERNAL MED  [address] [facility]  [phone]        HISTORY:    Krishna Miller presents today for evaluation and management of:  Chief Complaint   Patient presents with    Diabetes       Diabetes   She presents for her follow-up diabetic visit. She has type 2 diabetes mellitus. No MedicAlert identification noted. Her disease course has been improving. There are no hypoglycemic associated symptoms. Pertinent negatives for hypoglycemia include no confusion, dizziness, headaches, seizures or tremors. Associated symptoms include fatigue and weight loss (related to diet). Pertinent negatives for diabetes include no blurred vision, no chest pain, no foot paresthesias, no foot ulcerations, no polydipsia, no polyphagia and no polyuria. There are no hypoglycemic complications. Symptoms are stable. Pertinent negatives for diabetic complications include no CVA, heart disease, nephropathy or peripheral neuropathy. Risk factors for coronary artery disease include diabetes mellitus, dyslipidemia, family history, hypertension and obesity. Current diabetic treatment includes oral agent (monotherapy). She is compliant with treatment all of the time. Her weight is decreasing steadily. She is following a diabetic diet. Meal planning includes carbohydrate counting (20 grams per day ). She has not had a previous visit with a dietitian. She participates in exercise daily (walking 45 minutes). She monitors blood glucose at home 1-2 x per day. Blood glucose monitoring compliance is good. Her home blood glucose trend is decreasing steadily. Her breakfast blood glucose is taken between 8-9 am. Her breakfast blood glucose range is generally 110-130 mg/dl. An ACE inhibitor/angiotensin II receptor blocker is being taken. She does not see a podiatrist.Eye exam is not current (appointment rebecca on june 25). She is here for follow up diabetic education.  She has cut out all MISC Check sugars daily 100 each 3     No current facility-administered medications for this visit. Review ofSymptoms:  Review of Systems   Constitutional: Positive for fatigue and weight loss (related to diet). Negative for unexpected weight change. Eyes: Negative for blurred vision and visual disturbance. Respiratory: Negative. Negative for shortness of breath. Cardiovascular: Negative for chest pain and leg swelling. Gastrointestinal: Negative for abdominal pain and diarrhea. Endocrine: Negative for polydipsia, polyphagia and polyuria. Genitourinary: Negative. Musculoskeletal: Negative. Skin: Negative for rash and wound. Neurological: Negative for dizziness, tremors, seizures and headaches. Psychiatric/Behavioral: Negative. Negative for confusion and decreased concentration. Theremainder of a complete 14-point review of systems is negative. Vital Signs: /60 (Site: Left Upper Arm, Position: Sitting)   Pulse 72   Resp 12   Wt 219 lb (99.3 kg)   BMI 37.59 kg/m²      Wt Readings from Last 3 Encounters:   06/04/19 219 lb (99.3 kg)   04/10/19 236 lb (107 kg)   03/05/19 243 lb 11.2 oz (110.5 kg)     Body mass index is 37.59 kg/m².   LABS:  Hemoglobin A1C   Date Value Ref Range Status   06/04/2019 5.8 % Final   02/05/2019 7.4 % Final     Lab Results   Component Value Date    LABMICR 1.1 04/16/2018     Lab Results   Component Value Date     04/01/2019    K 4.2 04/01/2019     04/01/2019    CO2 30 04/01/2019    BUN 11 04/01/2019    CREATININE 0.6 04/01/2019    GLUCOSE 108 (H) 04/01/2019    CALCIUM 9.5 04/01/2019    PROT 6.7 03/04/2018    LABALBU 3.8 03/04/2018    BILITOT neg 03/04/2018    ALKPHOS 68 03/04/2018    AST 20 04/01/2019    ALT 24 04/01/2019    AGRATIO 1.3 03/04/2018    GLOB 2.9 03/04/2018     Lab Results   Component Value Date    CHOL 225 (H) 04/01/2019    CHOL 249 (H) 04/16/2018    CHOL 270 12/01/2016     Lab Results   Component Value Date    TRIG 104 04/01/2019    TRIG 168 04/16/2018    TRIG 259 12/01/2016     Lab Results   Component Value Date    HDL 53 12/01/2016     Lab Results   Component Value Date    LDLCALC 150.2 04/01/2019    LDLCALC 164.4 (H) 04/16/2018    LDLCALC 165.2 (A) 12/01/2016     Lab Results   Component Value Date    VLDL 21 04/01/2019    VLDL 34 04/16/2018    VLDL 52 12/01/2016     Lab Results   Component Value Date    CHOLHDLRATIO 4.2 04/01/2019    CHOLHDLRATIO 4.9 (H) 04/16/2018    CHOLHDLRATIO 5.1 12/01/2016           Physical Exam   Constitutional: She is oriented to person, place, and time. She appears well-developed. Eyes: Pupils are equal, round, and reactive to light. Cardiovascular: Normal rate, regular rhythm, normal heart sounds and intact distal pulses. Pulmonary/Chest: Effort normal and breath sounds normal.   Abdominal: Soft. Bowel sounds are normal.   Musculoskeletal: She exhibits no edema (to lower extremites ). Neurological: She is alert and oriented to person, place, and time. Skin: Skin is warm and dry. Negative for open/nonhealing wounds. Negative for lipohypertrophy. Psychiatric: She has a normal mood and affect. ASSESSMENT/PLAN:     Diagnosis Orders   1. Type 2 diabetes mellitus without complication, without long-term current use of insulin (HCC)  POCT glycosylated hemoglobin (Hb A1C)    Microalbumin, Ur   2. Diabetes education, encounter for     3. Class 2 severe obesity due to excess calories with serious comorbidity and body mass index (BMI) of 37.0 to 37.9 in adult ([facility]     4. BMI 37.0-37.9, adult     5. Mixed hyperlipidemia     6. Essential hypertension       Orders Placed This Encounter   Procedures    Microalbumin, Ur    POCT glycosylated hemoglobin (Hb A1C)     No orders of the defined types were placed in this encounter. Requested Prescriptions      No prescriptions requested or ordered in this encounter       1.  Type 2 diabetes mellitus without complication, without long-term current use of insulin ([facility] [address]  2. Diabetes education, encounter for    - POCT glycosylated hemoglobin (Hb A1C)  - Microalbumin, Ur; Future  - Stable  HbA1C goal is less than 7% and blood sugars are improving.  - Encouraged patient to check BS 1 times per day. Fasting blood glucose goal is 70-130mg/dl and postprandial blood sugar goal is less than 180 mg/dl. -Diabetic foot exam reviewed and is normal and is current?: Yes.   -Diabetic retinal exam reviewed and is current?:No she has an appointment scheduled on June 25th.  - Labs reviewed includes: POCT A! C today was 5.8% significant improvement from previous results, crt 0.6 GFR>60. Repeat labs ordered Yes due in now microalbumin only.   -We discussed in great detail dietary modifications they can make to better improve their blood sugars. --Blood sugar report reviewed and scanned into media tab. -encouraged more fruits and vegetables  -continue to work hard at a well balanced diet but do not stress about eating carbs occasionally . Discussed signs and symptoms of hyper/hypoglycemia and how to treat. Encouraged 150 minutes of physical activity per week. Ernie Earl plans to engage in walking for 45 minutes a day 5 times a week. Follow a low carbohydrate diet consuming 30 grams of carbohydrates at breakfast, lunch and dinner with two separate snacks [id number] grams of carbohydrates. Encouraged at least 7 hours of sleep. The patient was informed of the goals of diabetes management. This can only be accomplished by watching their diet and exercise levels. We certainly use medicines to help attain these goals. The consequences of not controlling blood sugars were discussed. These include blindness, heart disease, stroke, kidney disease, and possibly need for dialysis. They were told to be careful with their foot care as diabetics often have nerve damage, infections and risk for limb amutations . They also need a dilated eye exam yearly.  We discussed the issues of diet, exercise, medication, complication avoidance, reviewed the signs and symptoms of diabetes, hypoglycemic episodes, significance of HbA1C.         3. Class 2 severe obesity due to excess calories with serious comorbidity and body mass index (BMI) of 37.0 to 37.9 in adult ([facility] [address]  4. BMI 37.0-37.9, adult  Reduce calories and increase physical activity to achieve a slow and steady weight loss to improve blood pressure, cholesterol and diabetes. 5. Mixed hyperlipidemia  stable, lipid panel reviewed,  Trig 104continue current medications. Diet and exercise      6. Essential hypertension   stable, continue current medications. Diet and exercise Seek emergent care if chest pain develops. Answered all patient questions. Agrees to follow plan of care and to follow up in 3 months, sooner if needed. Call office if unexplained blood sugars less than 70 occur or above 400. Call office or access [email or web address] with any further questions or concerns. Be sure to bring glucometer/food log at next appointment. Patient was seen with total face to face time of 30 minutes. More than 50%  of this visit was counseling and education regarding her diabetes.     Electronically signed by IRENE Childress CNP on 6/4/2019 at 2:40 PM      (Please note that portions of this note were completed with a voice-recognition program. Efforts were made to edit the dictation but occasionally words are mis-transcribed.)

## 2019-07-18 ENCOUNTER — OFFICE VISIT (OUTPATIENT)
Dept: FAMILY MEDICINE CLINIC | Age: 54
End: 2019-07-18
Payer: COMMERCIAL

## 2019-07-18 VITALS
SYSTOLIC BLOOD PRESSURE: 118 MMHG | WEIGHT: 207.1 LBS | HEART RATE: 75 BPM | TEMPERATURE: 96.7 F | BODY MASS INDEX: 35.55 KG/M2 | DIASTOLIC BLOOD PRESSURE: 78 MMHG

## 2019-07-18 DIAGNOSIS — J02.9 ACUTE VIRAL PHARYNGITIS: Primary | ICD-10-CM

## 2019-07-18 PROCEDURE — 99213 OFFICE O/P EST LOW 20 MIN: CPT | Performed by: NURSE PRACTITIONER

## 2019-07-18 ASSESSMENT — ENCOUNTER SYMPTOMS
NAUSEA: 1
SINUS PRESSURE: 0
ABDOMINAL PAIN: 0
SORE THROAT: 1
RHINORRHEA: 0
COUGH: 0
SHORTNESS OF BREATH: 0
WHEEZING: 0
VOICE CHANGE: 1

## 2019-08-20 LAB
CREATININE, RANDOM: 19.1 MG/DL (ref 20–370)
MICROALBUMIN UR-MCNC: <0.6 MG/DL (ref 0–1.7)

## 2019-09-03 ENCOUNTER — OFFICE VISIT (OUTPATIENT)
Dept: DIABETES SERVICES | Age: 54
End: 2019-09-03
Payer: COMMERCIAL

## 2019-09-03 VITALS
RESPIRATION RATE: 12 BRPM | BODY MASS INDEX: 33.99 KG/M2 | SYSTOLIC BLOOD PRESSURE: 112 MMHG | DIASTOLIC BLOOD PRESSURE: 60 MMHG | HEART RATE: 76 BPM | WEIGHT: 198 LBS

## 2019-09-03 DIAGNOSIS — E11.9 TYPE 2 DIABETES MELLITUS WITHOUT COMPLICATION, WITHOUT LONG-TERM CURRENT USE OF INSULIN (HCC): Primary | ICD-10-CM

## 2019-09-03 DIAGNOSIS — Z71.89 DIABETES EDUCATION, ENCOUNTER FOR: ICD-10-CM

## 2019-09-03 DIAGNOSIS — I10 ESSENTIAL HYPERTENSION: ICD-10-CM

## 2019-09-03 DIAGNOSIS — Z86.59 HISTORY OF BULIMIA: ICD-10-CM

## 2019-09-03 DIAGNOSIS — E78.2 MIXED HYPERLIPIDEMIA: ICD-10-CM

## 2019-09-03 PROCEDURE — 99214 OFFICE O/P EST MOD 30 MIN: CPT | Performed by: NURSE PRACTITIONER

## 2019-09-03 ASSESSMENT — ENCOUNTER SYMPTOMS
DIARRHEA: 0
BLURRED VISION: 0
ABDOMINAL PAIN: 0
RESPIRATORY NEGATIVE: 1
VISUAL CHANGE: 0
SHORTNESS OF BREATH: 0

## 2019-09-03 NOTE — Clinical Note
Regarding her history of eating disorder she states she is having some thoughts again and discussed a referral for counseling. Who would you recommend. I also asked her to address this again at her next nida with you.

## 2019-09-03 NOTE — PROGRESS NOTES
[address] [facility] INTERNAL MED  [address] [facility], [address]  [facility] [id number] [phone]        HISTORY:    Pearl Clark presents today for evaluation and management of:  Chief Complaint   Patient presents with    Diabetes       Diabetes   She presents for her follow-up diabetic visit. She has type 2 diabetes mellitus. Her disease course has been improving. There are no hypoglycemic associated symptoms. Pertinent negatives for hypoglycemia include no confusion, dizziness, headaches, seizures or tremors. Associated symptoms include fatigue and weight loss (do to diet). Pertinent negatives for diabetes include no blurred vision, no chest pain, no foot paresthesias, no foot ulcerations, no polydipsia, no polyphagia, no polyuria, no visual change and no weakness. There are no hypoglycemic complications. There are no diabetic complications. Risk factors for coronary artery disease include diabetes mellitus, dyslipidemia, family history, hypertension, obesity and stress. Current diabetic treatment includes oral agent (monotherapy). She is compliant with treatment all of the time. Her weight is decreasing steadily. She is following a diabetic diet. Meal planning includes carbohydrate counting. She has not had a previous visit with a dietitian. She participates in exercise daily (walks 45 minutes). She monitors blood glucose at home 3-4 x per week. Her breakfast blood glucose is taken between 7-8 am. An ACE inhibitor/angiotensin II receptor blocker is being taken. She does not see a podiatrist.Eye exam is current. She does have an extensive history of bulimia when she was younger. She states she is having some thoughts returning. She is constenlty thinking about food and nervous she will start gaining weight back.      She is counting carbs and calories     Current Diabetic Medications  Metformin 1000 mg BID    DKA episodes: 0      High cholesterol-  Takes lipitor and denies any adverse effects with its use. Watches diet and exercise. Hypertension-  Takes lisinopril and denies any adverse effects with their use. Watches diet and exercise. Denies any chest pain, dizziness or edema. Follows with cardiology:No.    Obesity- Working on weight loss. In the past month and a half she has lost about 9 lbs         Past Medical History:   Diagnosis Date    History of bulimia     in 20 and 30's    Hyperlipidemia     Hypertension     Type 2 diabetes mellitus without complication, without long-term current use of insulin ([facility] 7/29/2018     Family History   Problem Relation Age of Onset    Arthritis Mother     Cancer Mother         endometrial     Social History     Tobacco Use    Smoking status: Never Smoker    Smokeless tobacco: Never Used   Substance Use Topics    Alcohol use: No    Drug use: Not on file     No Known Allergies    MEDICATIONS:  Current Outpatient Medications   Medication Sig Dispense Refill    atorvastatin (LIPITOR) 20 MG tablet Take 1 tablet by mouth daily 30 tablet 5    lisinopril (PRINIVIL;ZESTRIL) 20 MG tablet TAKE 1 TABLET BY MOUTH ONCE DAILY 30 tablet 5    FLUoxetine (PROZAC) 20 MG capsule Take 1 capsule by mouth daily 90 capsule 3    metFORMIN (GLUCOPHAGE-XR) 500 MG extended release tablet Take 2 tablets by mouth daily (with breakfast) 180 tablet 3    glucose blood VI test strips (ASCENSIA AUTODISC VI;ONE TOUCH ULTRA TEST VI) strip 1 each by In Vitro route daily As needed. 100 each 3    Lancets MISC Check sugars daily 100 each 3     No current facility-administered medications for this visit. Review ofSymptoms:  Review of Systems   Constitutional: Positive for fatigue and weight loss (do to diet). Negative for unexpected weight change. Eyes: Negative for blurred vision and visual disturbance. Respiratory: Negative. Negative for shortness of breath. Cardiovascular: Negative for chest pain and leg swelling.    Gastrointestinal: Negative for abdominal pain and diarrhea. Endocrine: Negative for polydipsia, polyphagia and polyuria. Genitourinary: Negative. Musculoskeletal: Negative. Skin: Negative for rash and wound. Neurological: Negative for dizziness, tremors, seizures, weakness and headaches. Psychiatric/Behavioral: Negative. Negative for confusion and decreased concentration. Theremainder of a complete 14-point review of systems is negative. Vital Signs: /60   Pulse 76   Resp 12   Wt 198 lb (89.8 kg)   BMI 33.99 kg/m²      Wt Readings from Last 3 Encounters:   09/03/19 198 lb (89.8 kg)   07/18/19 207 lb 1.6 oz (93.9 kg)   06/04/19 219 lb (99.3 kg)     Body mass index is 33.99 kg/m².   LABS:  Hemoglobin A1C   Date Value Ref Range Status   06/04/2019 5.8 % Final   02/05/2019 7.4 % Final     Lab Results   Component Value Date    LABMICR <0.6 08/20/2019     Lab Results   Component Value Date     04/01/2019    K 4.2 04/01/2019     04/01/2019    CO2 30 04/01/2019    BUN 11 04/01/2019    CREATININE 0.6 04/01/2019    GLUCOSE 108 (H) 04/01/2019    CALCIUM 9.5 04/01/2019    PROT 6.7 03/04/2018    LABALBU 3.8 03/04/2018    BILITOT neg 03/04/2018    ALKPHOS 68 03/04/2018    AST 20 04/01/2019    ALT 24 04/01/2019    AGRATIO 1.3 03/04/2018    GLOB 2.9 03/04/2018     Lab Results   Component Value Date    CHOL 225 (H) 04/01/2019    CHOL 249 (H) 04/16/2018    CHOL 270 12/01/2016     Lab Results   Component Value Date    TRIG 104 04/01/2019    TRIG 168 04/16/2018    TRIG 259 12/01/2016     Lab Results   Component Value Date    HDL 53 12/01/2016     Lab Results   Component Value Date    LDLCALC 150.2 04/01/2019    LDLCALC 164.4 (H) 04/16/2018    LDLCALC 165.2 (A) 12/01/2016     Lab Results   Component Value Date    VLDL 21 04/01/2019    VLDL 34 04/16/2018    VLDL 52 12/01/2016     Lab Results   Component Value Date    CHOLHDLRATIO 4.2 04/01/2019    CHOLHDLRATIO 4.9 (H) 04/16/2018    CHOLHDLRATIO 5.1 12/01/2016           Physical Exam Efforts were made to edit the dictation but occasionally words are mis-transcribed.)

## 2019-09-25 RX ORDER — LISINOPRIL 20 MG/1
TABLET ORAL
Qty: 90 TABLET | Refills: 1 | Status: SHIPPED | OUTPATIENT
Start: 2019-09-25 | End: 2020-03-03 | Stop reason: SDUPTHER

## 2019-10-08 DIAGNOSIS — E78.2 MIXED HYPERLIPIDEMIA: ICD-10-CM

## 2019-10-09 ENCOUNTER — OFFICE VISIT (OUTPATIENT)
Dept: FAMILY MEDICINE CLINIC | Age: 54
End: 2019-10-09
Payer: COMMERCIAL

## 2019-10-09 VITALS
BODY MASS INDEX: 33.03 KG/M2 | SYSTOLIC BLOOD PRESSURE: 122 MMHG | WEIGHT: 192.4 LBS | OXYGEN SATURATION: 98 % | DIASTOLIC BLOOD PRESSURE: 74 MMHG | HEART RATE: 75 BPM

## 2019-10-09 DIAGNOSIS — I10 ESSENTIAL HYPERTENSION: ICD-10-CM

## 2019-10-09 DIAGNOSIS — E78.2 MIXED HYPERLIPIDEMIA: ICD-10-CM

## 2019-10-09 DIAGNOSIS — E11.9 TYPE 2 DIABETES MELLITUS WITHOUT COMPLICATION, WITHOUT LONG-TERM CURRENT USE OF INSULIN (HCC): Primary | ICD-10-CM

## 2019-10-09 PROBLEM — M25.559 PAIN IN JOINT, PELVIC REGION AND THIGH: Status: ACTIVE | Noted: 2019-10-09

## 2019-10-09 PROBLEM — M76.899 ENTHESOPATHY OF HIP REGION: Status: ACTIVE | Noted: 2019-10-09

## 2019-10-09 PROBLEM — M16.10 PRIMARY LOCALIZED OSTEOARTHRITIS OF PELVIC REGION AND THIGH: Status: ACTIVE | Noted: 2019-10-09

## 2019-10-09 LAB — HBA1C MFR BLD: 5.6 %

## 2019-10-09 PROCEDURE — 99214 OFFICE O/P EST MOD 30 MIN: CPT | Performed by: FAMILY MEDICINE

## 2019-10-09 PROCEDURE — 83036 HEMOGLOBIN GLYCOSYLATED A1C: CPT | Performed by: FAMILY MEDICINE

## 2019-10-09 RX ORDER — ATORVASTATIN CALCIUM 20 MG/1
20 TABLET, FILM COATED ORAL DAILY
Qty: 90 TABLET | Refills: 3 | Status: SHIPPED | OUTPATIENT
Start: 2019-10-09 | End: 2020-10-08

## 2019-10-09 RX ORDER — ATORVASTATIN CALCIUM 20 MG/1
TABLET, FILM COATED ORAL
Qty: 30 TABLET | Refills: 5 | OUTPATIENT
Start: 2019-10-09

## 2019-10-09 ASSESSMENT — ENCOUNTER SYMPTOMS
SHORTNESS OF BREATH: 0
DIARRHEA: 0
ABDOMINAL PAIN: 0
CONSTIPATION: 0
COUGH: 0
WHEEZING: 0

## 2019-11-04 ENCOUNTER — PROCEDURE VISIT (OUTPATIENT)
Dept: FAMILY MEDICINE CLINIC | Age: 54
End: 2019-11-04
Payer: COMMERCIAL

## 2019-11-04 VITALS
WEIGHT: 192.7 LBS | OXYGEN SATURATION: 96 % | DIASTOLIC BLOOD PRESSURE: 82 MMHG | SYSTOLIC BLOOD PRESSURE: 124 MMHG | HEART RATE: 63 BPM | BODY MASS INDEX: 33.08 KG/M2

## 2019-11-04 DIAGNOSIS — L72.0 EPIDERMAL CYST OF FACE: Primary | ICD-10-CM

## 2019-11-04 PROCEDURE — 11442 EXC FACE-MM B9+MARG 1.1-2 CM: CPT | Performed by: FAMILY MEDICINE

## 2019-11-11 ENCOUNTER — NURSE ONLY (OUTPATIENT)
Dept: FAMILY MEDICINE CLINIC | Age: 54
End: 2019-11-11
Payer: COMMERCIAL

## 2019-11-11 DIAGNOSIS — Z23 NEED FOR INFLUENZA VACCINATION: Primary | ICD-10-CM

## 2019-11-11 PROCEDURE — 90471 IMMUNIZATION ADMIN: CPT | Performed by: FAMILY MEDICINE

## 2019-11-11 PROCEDURE — 90686 IIV4 VACC NO PRSV 0.5 ML IM: CPT | Performed by: FAMILY MEDICINE

## 2019-12-03 ENCOUNTER — OFFICE VISIT (OUTPATIENT)
Dept: DIABETES SERVICES | Age: 54
End: 2019-12-03
Payer: COMMERCIAL

## 2019-12-03 VITALS
DIASTOLIC BLOOD PRESSURE: 62 MMHG | SYSTOLIC BLOOD PRESSURE: 112 MMHG | WEIGHT: 189 LBS | BODY MASS INDEX: 32.44 KG/M2 | RESPIRATION RATE: 16 BRPM | HEART RATE: 62 BPM

## 2019-12-03 DIAGNOSIS — E66.09 CLASS 1 OBESITY DUE TO EXCESS CALORIES WITH SERIOUS COMORBIDITY AND BODY MASS INDEX (BMI) OF 32.0 TO 32.9 IN ADULT: ICD-10-CM

## 2019-12-03 DIAGNOSIS — I10 ESSENTIAL HYPERTENSION: ICD-10-CM

## 2019-12-03 DIAGNOSIS — Z86.59 HISTORY OF BULIMIA: ICD-10-CM

## 2019-12-03 DIAGNOSIS — Z71.89 DIABETES EDUCATION, ENCOUNTER FOR: ICD-10-CM

## 2019-12-03 DIAGNOSIS — E11.9 TYPE 2 DIABETES MELLITUS WITHOUT COMPLICATION, WITHOUT LONG-TERM CURRENT USE OF INSULIN (HCC): Primary | ICD-10-CM

## 2019-12-03 DIAGNOSIS — E78.2 MIXED HYPERLIPIDEMIA: ICD-10-CM

## 2019-12-03 PROCEDURE — 99214 OFFICE O/P EST MOD 30 MIN: CPT | Performed by: NURSE PRACTITIONER

## 2019-12-03 ASSESSMENT — ENCOUNTER SYMPTOMS
VISUAL CHANGE: 0
BLURRED VISION: 0

## 2020-02-03 LAB
ALT SERPL-CCNC: 27 UNITS/L (ref 9–52)
ANION GAP SERPL CALCULATED.3IONS-SCNC: 8.9 MMOL/L
AST SERPL-CCNC: 26 UNITS/L (ref 14–36)
BUN BLDV-MCNC: 20 MG/DL (ref 7–17)
CALCIUM SERPL-MCNC: 9.6 MG/DL (ref 8.4–10.2)
CHLORIDE BLD-SCNC: 103 MMOL/L (ref 98–120)
CHOLESTEROL/HDL RATIO: 2.53 RATIO (ref 0–4.5)
CHOLESTEROL: 185 MG/DL (ref 50–200)
CO2: 27 MMOL/L (ref 22–31)
CREAT SERPL-MCNC: 0.7 MG/DL (ref 0.5–1)
CREATININE, RANDOM URINE: 57.4 MG/DL (ref 20–370)
GFR CALCULATED: > 60
GLUCOSE: 112 MG/DL (ref 65–105)
HDLC SERPL-MCNC: 73 MG/DL (ref 36–68)
LDL CHOLESTEROL CALCULATED: 98.4 MG/DL (ref 0–160)
MICROALBUMIN UR-MCNC: 1.6 MG/DL (ref 0–1.7)
POTASSIUM SERPL-SCNC: 4.7 MMOL/L (ref 3.6–5)
SODIUM BLD-SCNC: 139 MMOL/L (ref 135–145)
TRIGL SERPL-MCNC: 68 MG/DL (ref 10–250)
VLDLC SERPL CALC-MCNC: 14 MG/DL (ref 0–40)

## 2020-02-05 ENCOUNTER — OFFICE VISIT (OUTPATIENT)
Dept: FAMILY MEDICINE CLINIC | Age: 55
End: 2020-02-05
Payer: COMMERCIAL

## 2020-02-05 VITALS
HEIGHT: 64 IN | DIASTOLIC BLOOD PRESSURE: 78 MMHG | OXYGEN SATURATION: 100 % | BODY MASS INDEX: 30.56 KG/M2 | HEART RATE: 84 BPM | SYSTOLIC BLOOD PRESSURE: 132 MMHG | WEIGHT: 179 LBS

## 2020-02-05 PROCEDURE — 99213 OFFICE O/P EST LOW 20 MIN: CPT | Performed by: FAMILY MEDICINE

## 2020-02-05 ASSESSMENT — PATIENT HEALTH QUESTIONNAIRE - PHQ9
1. LITTLE INTEREST OR PLEASURE IN DOING THINGS: 0
SUM OF ALL RESPONSES TO PHQ QUESTIONS 1-9: 0
SUM OF ALL RESPONSES TO PHQ QUESTIONS 1-9: 0
SUM OF ALL RESPONSES TO PHQ9 QUESTIONS 1 & 2: 0
2. FEELING DOWN, DEPRESSED OR HOPELESS: 0

## 2020-02-05 NOTE — PROGRESS NOTES
[address] [address] [address]  Dept: [phone]  Dept [facility]:[phone]    Janel Leonard is a 53 y.o. female who presents today for her medical conditions/complaints as notedbelow. Janel Leonard is c/o of Constipation (only has very small bowel movements, last normal BM was 2 weeks ago) and Abdominal Pain (lower abdominal for the past week)      HPI:     HPI    Has not been eating much at all, working on losing weight. Usually eating only about 700-100 calories per day. Will eat a few eggs, yogurt or oatmeal. Fish or chicken for supper. No fruits or veggies at all. Is not really getting any fiber at all in her diet. The pain she has been having is in the lower abd, missael when she strains. No BM for the last 2 weeks. No nausea or vomiting. No blood in her stool at all. Did have some trouble in the past when she was on a restrictive diet with constipation but not otherwise. Has a history of the rectocele.      BP Readings from Last 3 Encounters:   02/05/20 132/78   12/03/19 112/62   11/04/19 124/82          (goal 120/80)    Wt Readings from Last 3 Encounters:   02/05/20 179 lb (81.2 kg)   12/03/19 189 lb (85.7 kg)   11/04/19 192 lb 11.2 oz (87.4 kg)        Past Medical History:   Diagnosis Date    History of bulimia     in 20 and 30's    Hyperlipidemia     Hypertension     Type 2 diabetes mellitus without complication, without long-term current use of insulin ([facility] [address] 7/29/2018      Past Surgical History:   Procedure Laterality Date    ANKLE SURGERY      scope    CERVICAL POLYP REMOVAL  01/25/2017    CHOLECYSTECTOMY      DILATION AND CURETTAGE OF UTERUS  01/2017    due to menorrhagia    TUBAL LIGATION         Family History   Problem Relation Age of Onset    Arthritis Mother     Cancer Mother         endometrial       Social History     Tobacco Use    Smoking status: Never Smoker    Smokeless tobacco: Never Used   Substance 02/03/2020     02/03/2020    CREATININE 0.7 02/03/2020    BUN 20 (H) 02/03/2020    CO2 27 02/03/2020    TSH 1.79 04/16/2018    LABA1C 5.6 10/09/2019    LABA1C 5.8 06/04/2019    LABA1C 7.4 02/05/2019    LABMICR 1.6 02/03/2020       Return for As scheduled. Patient given educational materials - see patientinstructions. Discussed use, benefit, and side effects of prescribed medications. All patient questions answered. Pt voiced understanding. Reviewed health maintenance. Instructed to continue current medications, diet andexercise. Patient agreed with treatment plan. Follow up as directed.      Electronically signed by Verónica Sandhu MD on 2/9/2020

## 2020-03-03 ENCOUNTER — OFFICE VISIT (OUTPATIENT)
Dept: DIABETES SERVICES | Age: 55
End: 2020-03-03
Payer: COMMERCIAL

## 2020-03-03 VITALS — BODY MASS INDEX: 30.81 KG/M2 | WEIGHT: 179.6 LBS

## 2020-03-03 LAB — HBA1C MFR BLD: 5.7 %

## 2020-03-03 PROCEDURE — 99214 OFFICE O/P EST MOD 30 MIN: CPT | Performed by: NURSE PRACTITIONER

## 2020-03-03 PROCEDURE — 83036 HEMOGLOBIN GLYCOSYLATED A1C: CPT | Performed by: NURSE PRACTITIONER

## 2020-03-03 RX ORDER — LISINOPRIL 20 MG/1
TABLET ORAL
Qty: 90 TABLET | Refills: 1
Start: 2020-03-03 | End: 2020-03-11 | Stop reason: DRUGHIGH

## 2020-03-03 ASSESSMENT — ENCOUNTER SYMPTOMS
VISUAL CHANGE: 0
BLURRED VISION: 0

## 2020-03-03 NOTE — PROGRESS NOTES
(PROZAC) 20 MG capsule Take 1 capsule by mouth daily 90 capsule 3    metFORMIN (GLUCOPHAGE-XR) 500 MG extended release tablet Take 2 tablets by mouth daily (with breakfast) 180 tablet 3    glucose blood VI test strips (ASCENSIA AUTODISC VI;ONE TOUCH ULTRA TEST VI) strip 1 each by In Vitro route daily As needed. 100 each 3    Lancets MISC Check sugars daily 100 each 3     No current facility-administered medications for this visit. Review ofSymptoms:  Review of Systems   Constitutional: Positive for fatigue and weight loss (do to diet). Negative for unexpected weight change. Eyes: Negative for blurred vision and visual disturbance. Respiratory: Negative. Negative for shortness of breath. Cardiovascular: Negative for chest pain and leg swelling. Gastrointestinal: Negative for abdominal pain and diarrhea. Endocrine: Negative for polydipsia, polyphagia and polyuria. Genitourinary: Negative. Musculoskeletal: Negative. Skin: Negative for rash and wound. Neurological: Negative for dizziness, tremors, seizures, weakness and headaches. Psychiatric/Behavioral: Negative. Negative for confusion and decreased concentration. Theremainder of a complete 14-point review of systems is negative. Vital Signs: Wt 179 lb 9.6 oz (81.5 kg)   BMI 30.81 kg/m²      Wt Readings from Last 3 Encounters:   03/03/20 179 lb 9.6 oz (81.5 kg)   02/05/20 179 lb (81.2 kg)   12/03/19 189 lb (85.7 kg)     Body mass index is 30.81 kg/m².   LABS:  Hemoglobin A1C   Date Value Ref Range Status   03/03/2020 5.7 % Final   10/09/2019 5.6 % Final     Lab Results   Component Value Date    LABMICR 1.6 02/03/2020     Lab Results   Component Value Date     02/03/2020    K 4.7 02/03/2020     02/03/2020    CO2 27 02/03/2020    BUN 20 (H) 02/03/2020    CREATININE 0.7 02/03/2020    GLUCOSE 112 (H) 02/03/2020    CALCIUM 9.6 02/03/2020    PROT 6.7 03/04/2018    LABALBU 3.8 03/04/2018    BILITOT neg 03/04/2018 Placed This Encounter   Procedures    POCT glycosylated hemoglobin (Hb A1C)     Orders Placed This Encounter   Medications    lisinopril (PRINIVIL;ZESTRIL) 20 MG tablet     Sig: TAKE 1/2 TABLET BY MOUTH ONCE DAILY     Dispense:  90 tablet     Refill:  1     Requested Prescriptions     Signed Prescriptions Disp Refills    lisinopril (PRINIVIL;ZESTRIL) 20 MG tablet 90 tablet 1     Sig: TAKE 1/2 TABLET BY MOUTH ONCE DAILY       1. Type 2 diabetes mellitus without complication, without long-term current use of insulin ([facility]  2. Diabetes education, encounter for    - POCT glycosylated hemoglobin (Hb A1C)    - Stable  HbA1C goal is less than 7% and blood sugars show no change. - Encouraged patient to check BS 1 times per day. Fasting blood glucose goal is 70-130mg/dl and postprandial blood sugar goal is less than 180 mg/dl. -Diabetic foot exam reviewed and is normal and is current?: Yes.   -Diabetic retinal exam reviewed and is current? :Yes showing No diabetic retinopathy.  - Labs reviewed includes: Most recent A1c 5.7% kidney function within normal limits. Repeat labs due 6 months.   -We discussed in great detail dietary modifications they can make to better improve their blood sugars. -follow up diabetes education completed, all questions answered.  -No medication adjustments will be made at this time continue to work on diet low carb, would recommend patient not consume less than 1200 luz elena/day however patient has a history of eating disorder and is currently eating 600 luz elena/day patient is agreeable to alternate from 600 luz elena/day to 800 luz elena/day and on days she is exercising she will at least have 1000 luz elena/day.  -Would also recommend once daily protein/meal replacement drink. Discussed signs and symptoms of hyper/hypoglycemia and how to treat. Encouraged 150 minutes of physical activity per week.  Follow a low carbohydrate diet consuming 45 grams of carbohydrates at breakfast, lunch and dinner with two separate program. Efforts were made to edit the dictation but occasionally words are mis-transcribed.)

## 2020-03-04 ASSESSMENT — ENCOUNTER SYMPTOMS
DIARRHEA: 0
SHORTNESS OF BREATH: 0
ABDOMINAL PAIN: 0
RESPIRATORY NEGATIVE: 1

## 2020-03-11 RX ORDER — LISINOPRIL 5 MG/1
5 TABLET ORAL DAILY
Qty: 30 TABLET | Refills: 3 | Status: SHIPPED | OUTPATIENT
Start: 2020-03-11 | End: 2020-07-23

## 2020-03-30 RX ORDER — FLUOXETINE HYDROCHLORIDE 20 MG/1
CAPSULE ORAL
Qty: 90 CAPSULE | Refills: 3 | Status: SHIPPED | OUTPATIENT
Start: 2020-03-30 | End: 2021-02-17 | Stop reason: DRUGHIGH

## 2020-04-15 RX ORDER — METFORMIN HYDROCHLORIDE 500 MG/1
500 TABLET, EXTENDED RELEASE ORAL
Qty: 180 TABLET | Refills: 3 | Status: SHIPPED | OUTPATIENT
Start: 2020-04-15 | End: 2021-03-15

## 2020-04-29 ENCOUNTER — PATIENT MESSAGE (OUTPATIENT)
Dept: FAMILY MEDICINE CLINIC | Age: 55
End: 2020-04-29

## 2020-07-07 ENCOUNTER — OFFICE VISIT (OUTPATIENT)
Dept: DIABETES SERVICES | Age: 55
End: 2020-07-07
Payer: COMMERCIAL

## 2020-07-07 VITALS
BODY MASS INDEX: 29.68 KG/M2 | HEART RATE: 72 BPM | RESPIRATION RATE: 16 BRPM | SYSTOLIC BLOOD PRESSURE: 112 MMHG | WEIGHT: 173 LBS | DIASTOLIC BLOOD PRESSURE: 80 MMHG

## 2020-07-07 LAB — HBA1C MFR BLD: 5.6 %

## 2020-07-07 PROCEDURE — 99214 OFFICE O/P EST MOD 30 MIN: CPT | Performed by: NURSE PRACTITIONER

## 2020-07-07 PROCEDURE — 83036 HEMOGLOBIN GLYCOSYLATED A1C: CPT | Performed by: NURSE PRACTITIONER

## 2020-07-07 ASSESSMENT — ENCOUNTER SYMPTOMS
RESPIRATORY NEGATIVE: 1
ABDOMINAL PAIN: 0
DIARRHEA: 0
VISUAL CHANGE: 0
SHORTNESS OF BREATH: 0
BLURRED VISION: 0

## 2020-07-07 NOTE — PROGRESS NOTES
[facility] [address]  [address] [facility], [address]  [facility] [id number] [phone]        HISTORY:    Tyler Greer presents today for evaluation and management of:  Chief Complaint   Patient presents with    Diabetes       Diabetes   She presents for her follow-up diabetic visit. She has type 2 diabetes mellitus. Her disease course has been improving. There are no hypoglycemic associated symptoms. Pertinent negatives for hypoglycemia include no confusion, dizziness, headaches, seizures or tremors. Associated symptoms include fatigue and weight loss (do to diet). Pertinent negatives for diabetes include no blurred vision, no chest pain, no foot paresthesias, no foot ulcerations, no polydipsia, no polyphagia, no polyuria, no visual change and no weakness. There are no hypoglycemic complications. There are no diabetic complications. Risk factors for coronary artery disease include diabetes mellitus, dyslipidemia, family history, hypertension, obesity and stress. Current diabetic treatment includes oral agent (monotherapy). She is compliant with treatment all of the time. Her weight is decreasing steadily. She is following a diabetic diet. Meal planning includes carbohydrate counting and calorie counting (500 luz elena per day). She has not had a previous visit with a dietitian. She participates in exercise daily (walks 45 minutes). She monitors blood glucose at home 3-4 x per week. There is no change in her home blood glucose trend. Her overall blood glucose range is  mg/dl. An ACE inhibitor/angiotensin II receptor blocker is being taken. She does not see a podiatrist.Eye exam is current.        Interval History:  12/3/19  She is frustrated that she is not losing anymore weight but at this time she skipping meals and maintaining low carbs.   Breakfast eggs and plain yogurt  Lunch skipped      3/4/2020  Patient is here for follow-up diabetes management she has been working on her diet although she does have a history of eating disorder she is now following with a counselor to ensure thoughts about food do not become an issue. Patient does states she will pick one food and stick with it for a while currently she is eating lots of eggs. Her weight is trending down blood sugars are stable no hypoglycemia patient has started cardio drumming 1-2 times per week     7/7/2020  She is here for follow up dm. She is not counting calories anymore but eat relatively the same foods everyday, protein shakes and fish/chicken with veggies. She still struggles with thinking about food and weight loss all the time. She sees a counselor and they are working through these thoughts. Current Diabetic Medications  Metformin 1000 mg BID     DKA episodes: 0        High cholesterol-  Takes lipitor and denies any adverse effects with its use.  Watches diet and exercise.      Hypertension-  Takes lisinopril and denies any adverse effects with their use. Watches diet and exercise. Denies any chest pain,or edema. She states she occasionally gets lightheaded or dizzy and wondering if she needs to decrease her blood pressure medication Follows with cardiology:No.     Obesity- Working on weight loss.  Her stated ideal weight is 135 lbs  She does have a history of bulimia. She does states she is mentally preoccupied with her weight loss. She is seeing a counselor.   Weight is trending down    Past Medical History:   Diagnosis Date    History of bulimia     in 20 and 28's    Hyperlipidemia     Hypertension     Type 2 diabetes mellitus without complication, without long-term current use of insulin [facility] 7/29/2018     Family History   Problem Relation Age of Onset    Arthritis Mother     Cancer Mother         endometrial     Social History     Tobacco Use    Smoking status: Never Smoker    Smokeless tobacco: Never Used   Substance Use Topics    Alcohol use: No    Drug use: Not on file     No Known Allergies    MEDICATIONS:  Current Outpatient Medications   Medication Sig Dispense Refill    metFORMIN (GLUCOPHAGE-XR) 500 MG extended release tablet Take 1 tablet by mouth daily (with breakfast) 180 tablet 3    FLUoxetine (PROZAC) 20 MG capsule Take 1 capsule by mouth once daily 90 capsule 3    lisinopril (PRINIVIL;ZESTRIL) 5 MG tablet Take 1 tablet by mouth daily 30 tablet 3    atorvastatin (LIPITOR) 20 MG tablet Take 1 tablet by mouth daily 90 tablet 3    glucose blood VI test strips (ASCENSIA AUTODISC VI;ONE TOUCH ULTRA TEST VI) strip 1 each by In Vitro route daily As needed. 100 each 3    Lancets MISC Check sugars daily 100 each 3     No current facility-administered medications for this visit. Review ofSymptoms:  Review of Systems   Constitutional: Positive for fatigue and weight loss (do to diet). Negative for unexpected weight change. Eyes: Negative for blurred vision and visual disturbance. Respiratory: Negative. Negative for shortness of breath. Cardiovascular: Negative for chest pain and leg swelling. Gastrointestinal: Negative for abdominal pain and diarrhea. Endocrine: Negative for polydipsia, polyphagia and polyuria. Genitourinary: Negative. Musculoskeletal: Negative. Skin: Negative for rash and wound. Neurological: Negative for dizziness, tremors, seizures, weakness and headaches. Psychiatric/Behavioral: Negative. Negative for confusion and decreased concentration. Theremainder of a complete 14-point review of systems is negative. Vital Signs: /80   Pulse 72   Resp 16   Wt 173 lb (78.5 kg)   BMI 29.68 kg/m²      Wt Readings from Last 3 Encounters:   07/07/20 173 lb (78.5 kg)   03/03/20 179 lb 9.6 oz (81.5 kg)   02/05/20 179 lb (81.2 kg)     Body mass index is 29.68 kg/m².   LABS:  Hemoglobin A1C   Date Value Ref Range Status   07/07/2020 5.6 % Final   03/03/2020 5.7 % Final     Lab Results   Component Value Date LABMICR 1.6 02/03/2020     Lab Results   Component Value Date     02/03/2020    K 4.7 02/03/2020     02/03/2020    CO2 27 02/03/2020    BUN 20 (H) 02/03/2020    CREATININE 0.7 02/03/2020    GLUCOSE 112 (H) 02/03/2020    CALCIUM 9.6 02/03/2020    PROT 6.7 03/04/2018    LABALBU 3.8 03/04/2018    BILITOT neg 03/04/2018    ALKPHOS 68 03/04/2018    AST 26 02/03/2020    ALT 27 02/03/2020    LABGLOM > 60.0 02/03/2020    AGRATIO 1.3 03/04/2018    GLOB 2.9 03/04/2018     Lab Results   Component Value Date    CHOL 185 02/03/2020    CHOL 225 (H) 04/01/2019    CHOL 249 (H) 04/16/2018     Lab Results   Component Value Date    TRIG 68 02/03/2020    TRIG 104 04/01/2019    TRIG 168 04/16/2018     Lab Results   Component Value Date    HDL 73 (H) 02/03/2020    HDL 53 12/01/2016     Lab Results   Component Value Date    LDLCALC 98.4 02/03/2020    LDLCALC 150.2 04/01/2019    LDLCALC 164.4 (H) 04/16/2018     Lab Results   Component Value Date    VLDL 14 02/03/2020    VLDL 21 04/01/2019    VLDL 34 04/16/2018     Lab Results   Component Value Date    CHOLHDLRATIO 2.53 02/03/2020    CHOLHDLRATIO 4.2 04/01/2019    CHOLHDLRATIO 4.9 (H) 04/16/2018           Physical Exam  Constitutional:       Appearance: She is well-developed. Eyes:      Pupils: Pupils are equal, round, and reactive to light. Cardiovascular:      Rate and Rhythm: Normal rate and regular rhythm. Heart sounds: Normal heart sounds. Pulmonary:      Effort: Pulmonary effort is normal.      Breath sounds: Normal breath sounds. Abdominal:      General: Bowel sounds are normal.      Palpations: Abdomen is soft. Skin:     General: Skin is warm and dry. Comments: Negative for open/nonhealing wounds. Negative for lipohypertrophy. Neurological:      Mental Status: She is alert and oriented to person, place, and time. ASSESSMENT/PLAN:     Diagnosis Orders   1.  Type 2 diabetes mellitus without complication, without long-term current use of insulin (HCC)  POCT glycosylated hemoglobin (Hb A1C)   2. Diabetes education, encounter for     3. Mixed hyperlipidemia     4. Essential hypertension     5. History of bulimia       Orders Placed This Encounter   Procedures    POCT glycosylated hemoglobin (Hb A1C)     No orders of the defined types were placed in this encounter. Requested Prescriptions      No prescriptions requested or ordered in this encounter       1. Type 2 diabetes mellitus without complication, without long-term current use of insulin ([facility] [address]  2. Diabetes education, encounter for  - Stable  HbA1C goal is less than 7% and blood sugars are improving.  - Encouraged patient to check BS 2 times per week. Fasting blood glucose goal is 70-130mg/dl and postprandial blood sugar goal is less than 180 mg/dl. -Diabetic foot exam up-to-date: Yes  -Diabetic retinal exam up-to-date: Yes  - Labs reviewed includes: a1c today improved to 5.6%. Repeat labs due in 3 months.   -We discussed in great detail dietary modifications they can make to better improve their blood sugars. -follow up diabetes education completed, all questions answered. .focus on feeling good and not worry so much about low calorie diet. Alternate high/low calorie days. Continue exercise. Recommend not less than 1200 luz elena per day and ideal weight maintain or not less than 150 lbs. Discussed signs and symptoms of hyper/hypoglycemia and how to treat. Encouraged 150 minutes of physical activity per week. Follow a low carbohydrate diet consuming 45 grams of carbohydrates at breakfast, lunch and dinner with two separate snacks [id number] grams of carbohydrates. Encouraged at least 7 hours of sleep. The patient was informed of the goals of diabetes management. This can only be accomplished by watching their diet and exercise levels. We certainly use medicines to help attain these goals. The consequences of not controlling blood sugars were discussed.  These include blindness, heart disease, stroke, kidney disease, and possibly need for dialysis. They were told to be careful with their foot care as diabetics often have nerve damage, infections and risk for limb amutations . They also need a dilated eye exam yearly. We discussed the issues of diet, exercise, medication, complication avoidance, reviewed the signs and symptoms of diabetes, hypoglycemic episodes, significance of HbA1C.           3. Mixed hyperlipidemia  stable, lipid panel reviewed, continue current medications. Diet and exercise      4. Essential hypertension   stable, continue current medications. Diet and exercise Seek emergent care if chest pain develops. 5. History of bulimia  -keep follow up appt with counselor. Answered all patient questions. Agrees to follow plan of care and to follow up in 3 months, sooner if needed. Call office if unexplained blood sugars less than 70 occur or above 400. Call office or access MyChart with any further questions or concerns. Be sure to bring glucometer/food log at next appointment. Patient was seen with total face to face time of 30 minutes. More than 50%  of this visit was counseling and education regarding her diabetes.     Electronically signed by IRENE England CNP on 7/7/2020 at 11:46 AM      (Please note that portions of this note were completed with a voice-recognition program. Efforts were made to edit the dictation but occasionally words are mis-transcribed.)

## 2020-07-23 RX ORDER — LISINOPRIL 5 MG/1
TABLET ORAL
Qty: 90 TABLET | Refills: 3 | Status: SHIPPED | OUTPATIENT
Start: 2020-07-23 | End: 2021-07-28

## 2020-08-17 ENCOUNTER — OFFICE VISIT (OUTPATIENT)
Dept: FAMILY MEDICINE CLINIC | Age: 55
End: 2020-08-17
Payer: COMMERCIAL

## 2020-08-17 VITALS
HEART RATE: 69 BPM | OXYGEN SATURATION: 98 % | DIASTOLIC BLOOD PRESSURE: 66 MMHG | WEIGHT: 175 LBS | BODY MASS INDEX: 30.02 KG/M2 | SYSTOLIC BLOOD PRESSURE: 126 MMHG

## 2020-08-17 PROCEDURE — 99213 OFFICE O/P EST LOW 20 MIN: CPT | Performed by: FAMILY MEDICINE

## 2020-08-17 PROCEDURE — 11056 PARNG/CUTG B9 HYPRKR LES 2-4: CPT | Performed by: FAMILY MEDICINE

## 2020-08-17 NOTE — PROGRESS NOTES
[address] [facility]  [address]  Dept: [phone]  Dept HLL:[phone]    Sadia Kelsey is a 53 y.o. female who presents today for her medical conditions/complaints as notedbelow. Sadia Kelsey is c/o of Hypoglycemia (sugars have been running low- running in the 80s after breakfast in the morning.)      HPI:     HPI      Has also started to have lower sugars. Has had them intermittently. Does feel a bit yucky and tired when she has these episodes. It is when she is walking home that she will have these episodes. Usually this will happen when she is coming back in from her walk in the mornings. Usually will have a hard boiled egg and banana. Is usually at least 2-3 times per week. Callous on her feet.       BP Readings from Last 3 Encounters:   08/17/20 126/66   07/07/20 112/80   02/05/20 132/78          (goal 120/80)    Wt Readings from Last 3 Encounters:   08/17/20 175 lb (79.4 kg)   07/07/20 173 lb (78.5 kg)   03/03/20 179 lb 9.6 oz (81.5 kg)        Past Medical History:   Diagnosis Date    History of bulimia     in 19 and 30's    Hyperlipidemia     Hypertension     Type 2 diabetes mellitus without complication, without long-term current use of insulin [facility] 7/29/2018      Past Surgical History:   Procedure Laterality Date    ANKLE SURGERY      scope    CERVICAL POLYP REMOVAL  01/25/2017    CHOLECYSTECTOMY      DILATION AND CURETTAGE OF UTERUS  01/2017    due to menorrhagia    TUBAL LIGATION         Family History   Problem Relation Age of Onset    Arthritis Mother     Cancer Mother         endometrial       Social History     Tobacco Use    Smoking status: Never Smoker    Smokeless tobacco: Never Used   Substance Use Topics    Alcohol use: No      Current Outpatient Medications   Medication Sig Dispense Refill    lisinopril (PRINIVIL;ZESTRIL) 5 MG tablet Take 1 tablet by mouth once daily 90 tablet 3    metFORMIN (GLUCOPHAGE-XR) 500 MG extended release tablet Take 1 tablet by mouth daily (with breakfast) 180 tablet 3    FLUoxetine (PROZAC) 20 MG capsule Take 1 capsule by mouth once daily 90 capsule 3    atorvastatin (LIPITOR) 20 MG tablet Take 1 tablet by mouth daily 90 tablet 3    glucose blood VI test strips (ASCENSIA AUTODISC VI;ONE TOUCH ULTRA TEST VI) strip 1 each by In Vitro route daily As needed. 100 each 3    Lancets MISC Check sugars daily 100 each 3     No current facility-administered medications for this visit. No Known Allergies    Health Maintenance   Topic Date Due    HIV screen  12/31/1979    Hepatitis B vaccine (1 of 3 - Risk 3-dose series) 12/31/1983    DTaP/Tdap/Td vaccine (1 - Tdap) 12/31/1983    Colon cancer screen colonoscopy  12/31/2014    Cervical cancer screen  01/25/2020    Flu vaccine (1) 09/01/2020    Diabetic foot exam  10/09/2020    Diabetic microalbuminuria test  02/03/2021    Lipid screen  02/03/2021    Potassium monitoring  02/03/2021    Creatinine monitoring  02/03/2021    Diabetic retinal exam  06/25/2021    A1C test (Diabetic or Prediabetic)  07/07/2021    Breast cancer screen  08/20/2021    Shingles Vaccine  Completed    Pneumococcal 0-64 years Vaccine  Completed    Hepatitis C screen  Completed    Hepatitis A vaccine  Aged Out    Hib vaccine  Aged Out    Meningococcal (ACWY) vaccine  Aged Out       Subjective:      Review of Systems    Objective:     /66 (Site: Left Upper Arm, Position: Sitting, Cuff Size: Large Adult)   Pulse 69   Wt 175 lb (79.4 kg)   SpO2 98%   BMI 30.02 kg/m²     Physical Exam  Vitals signs and nursing note reviewed. Constitutional:       Appearance: Normal appearance. Cardiovascular:      Rate and Rhythm: Normal rate. Pulses: Normal pulses. Pulmonary:      Effort: Pulmonary effort is normal.   Musculoskeletal:        Feet:    Neurological:      Mental Status: She is alert.    Psychiatric:         Mood and Affect: Mood normal.         Behavior: Behavior normal.         Thought Content: Thought content normal.         Judgment: Judgment normal.         Assessment/Plan:      Diagnosis Orders   1. Callus of foot     2. Type 2 diabetes mellitus without complication, without long-term current use of insulin ([address]       Make sure wearing appropriately padded footwear. Return as needed for repeat shaving of the callus. Discussed increasing calories in the morning particular before her walk. Appears this is the only time she is having issues. Protein containing calories would be ideal.  Make sure she is taking a snack with her on her walks like a granola bar other issue. Suspect this is related to her overall hyperinsulinemia and her insulin resistance. If her blood sugars start to run significantly low in the 50s 60s for example then would consider testing levels but 80 certainly she can be symptomatic but this is not a worrisome range. Lab Results   Component Value Date    WBC 5.18 03/04/2018    HGB 13.3 03/04/2018    HCT 40.2 03/04/2018     03/04/2018    CHOL 185 02/03/2020    TRIG 68 02/03/2020    HDL 73 (H) 02/03/2020    ALT 27 02/03/2020    AST 26 02/03/2020     02/03/2020    K 4.7 02/03/2020     02/03/2020    CREATININE 0.7 02/03/2020    BUN 20 (H) 02/03/2020    CO2 27 02/03/2020    TSH 1.79 04/16/2018    LABA1C 5.6 07/07/2020    LABA1C 5.7 03/03/2020    LABA1C 5.6 10/09/2019    LABMICR 1.6 02/03/2020       Return if symptoms worsen or fail to improve. Patient given educational materials - see patientinstructions. Discussed use, benefit, and side effects of prescribed medications. All patient questions answered. Pt voiced understanding. Reviewed health maintenance. Instructed to continue current medications, diet andexercise. Patient agreed with treatment plan. Follow up as directed.      (Please note that portions of this note were completed with a voice-recognition program. Efforts

## 2020-10-06 ENCOUNTER — OFFICE VISIT (OUTPATIENT)
Dept: DIABETES SERVICES | Age: 55
End: 2020-10-06
Payer: COMMERCIAL

## 2020-10-06 VITALS
RESPIRATION RATE: 16 BRPM | SYSTOLIC BLOOD PRESSURE: 122 MMHG | DIASTOLIC BLOOD PRESSURE: 68 MMHG | BODY MASS INDEX: 31.05 KG/M2 | WEIGHT: 181 LBS

## 2020-10-06 PROCEDURE — 99214 OFFICE O/P EST MOD 30 MIN: CPT | Performed by: NURSE PRACTITIONER

## 2020-10-06 ASSESSMENT — ENCOUNTER SYMPTOMS
VISUAL CHANGE: 0
BLURRED VISION: 0
SHORTNESS OF BREATH: 0
DIARRHEA: 0
ABDOMINAL PAIN: 0
RESPIRATORY NEGATIVE: 1

## 2020-10-06 NOTE — PROGRESS NOTES
[facility] [address], [address]  [facility] [address] [phone]        HISTORY:    Cesar Ahumada presents today for evaluation and management of:  Chief Complaint   Patient presents with    Diabetes       Diabetes   She presents for her follow-up diabetic visit. She has type 2 diabetes mellitus. Her disease course has been improving. There are no hypoglycemic associated symptoms. Pertinent negatives for hypoglycemia include no confusion, dizziness, headaches, seizures or tremors. Associated symptoms include fatigue and weight loss (do to diet). Pertinent negatives for diabetes include no blurred vision, no chest pain, no foot paresthesias, no foot ulcerations, no polydipsia, no polyphagia, no polyuria, no visual change and no weakness. There are no hypoglycemic complications. There are no diabetic complications. Risk factors for coronary artery disease include diabetes mellitus, dyslipidemia, family history, hypertension, obesity and stress. Current diabetic treatment includes oral agent (monotherapy). She is compliant with treatment all of the time. Her weight is decreasing steadily. She is following a diabetic diet. Meal planning includes carbohydrate counting and calorie counting (500 luz elena per day). She has not had a previous visit with a dietitian. She participates in exercise daily (walks 45 minutes). She monitors blood glucose at home 3-4 x per week. There is no change in her home blood glucose trend. Her overall blood glucose range is  mg/dl. An ACE inhibitor/angiotensin II receptor blocker is being taken. She does not see a podiatrist.Eye exam is current.        Interval History:    Current Diabetic Medications   Metformin 1000 mg BID    DKA episodes: 0    12/3/19  She is frustrated that she is not losing anymore weight but at this time she skipping meals and maintaining low carbs.   Breakfast eggs and plain yogurt  Lunch skipped      3/4/2020  Patient is here for follow-up diabetes management she has been working on her diet although she does have a history of eating disorder she is now following with a counselor to ensure thoughts about food do not become an issue. Clemencia Morales does states she will pick one food and stick with it for a while currently she is eating lots of eggs. Renetta Overton weight is trending down blood sugars are stable no hypoglycemia patient has started cardio drumming 1-2 times per week     7/7/2020  She is here for follow up dm. She is not counting calories anymore but eat relatively the same foods everyday, protein shakes and fish/chicken with veggies. She still struggles with thinking about food and weight loss all the time. She sees a counselor and they are working through these thoughts. 10/06/20   She is watching her food intake. Not documenting our counting just eats the same thing everyday. She did go on vacation and ate more than normal. She still follows with counseling for history of eating disorder. Diet:2 shakes per day, sensible dinner. Exercise: Daily, drumming, walking, dance class. BS testing: not testing   Issues: denies     High cholesterol-  Takes lipitor and denies any adverse effects with its use.  Watches diet and exercise.      Hypertension-  Takes lisinopril and denies any adverse effects with their use. Watches diet and exercise. Denies any chest pain,or edema.  Follows with cardiology:No.     Obesity- Working on weight loss.  Her stated ideal weight is 135 lbs  She does have a history of bulimia.  She does states she is mentally preoccupied with her weight loss.  She is seeing a counselor.  Weight is stable      Past Medical History:   Diagnosis Date    History of bulimia     in 20 and 28's    Hyperlipidemia     Hypertension     Type 2 diabetes mellitus without complication, without long-term current use of insulin ([facility] [address] 7/29/2018     Family History   Problem Relation Age of Onset    Arthritis Mother     Cancer Mother         endometrial     Social History     Tobacco Use    Smoking status: Never Smoker    Smokeless tobacco: Never Used   Substance Use Topics    Alcohol use: No    Drug use: Not on file     No Known Allergies    MEDICATIONS:  Current Outpatient Medications   Medication Sig Dispense Refill    lisinopril (PRINIVIL;ZESTRIL) 5 MG tablet Take 1 tablet by mouth once daily 90 tablet 3    metFORMIN (GLUCOPHAGE-XR) 500 MG extended release tablet Take 1 tablet by mouth daily (with breakfast) 180 tablet 3    FLUoxetine (PROZAC) 20 MG capsule Take 1 capsule by mouth once daily 90 capsule 3    atorvastatin (LIPITOR) 20 MG tablet Take 1 tablet by mouth daily 90 tablet 3    glucose blood VI test strips (ASCENSIA AUTODISC VI;ONE TOUCH ULTRA TEST VI) strip 1 each by In Vitro route daily As needed. 100 each 3    Lancets MISC Check sugars daily 100 each 3     No current facility-administered medications for this visit. Review ofSymptoms:  Review of Systems   Constitutional: Positive for fatigue and weight loss (do to diet). Negative for unexpected weight change. Eyes: Negative for blurred vision and visual disturbance. Respiratory: Negative. Negative for shortness of breath. Cardiovascular: Negative for chest pain and leg swelling. Gastrointestinal: Negative for abdominal pain and diarrhea. Endocrine: Negative for polydipsia, polyphagia and polyuria. Genitourinary: Negative. Musculoskeletal: Negative. Skin: Negative for rash and wound. Neurological: Negative for dizziness, tremors, seizures, weakness and headaches. Psychiatric/Behavioral: Negative. Negative for confusion and decreased concentration. Theremainder of a complete 14-point review of systems is negative.        Vital Signs: /68   Resp 16   Wt 181 lb (82.1 kg)   BMI 31.05 kg/m²      Wt Readings from Last 3 Encounters:   10/06/20 181 lb (82.1 kg)   08/17/20 175 lb (79.4 kg)   07/07/20 173 lb (78.5 kg)     Body mass index is 31.05 kg/m². LABS:  Hemoglobin A1C   Date Value Ref Range Status   07/07/2020 5.6 % Final   03/03/2020 5.7 % Final     Lab Results   Component Value Date    LABMICR 1.6 02/03/2020     Lab Results   Component Value Date     02/03/2020    K 4.7 02/03/2020     02/03/2020    CO2 27 02/03/2020    BUN 20 (H) 02/03/2020    CREATININE 0.7 02/03/2020    GLUCOSE 112 (H) 02/03/2020    CALCIUM 9.6 02/03/2020    PROT 6.7 03/04/2018    LABALBU 3.8 03/04/2018    BILITOT neg 03/04/2018    ALKPHOS 68 03/04/2018    AST 26 02/03/2020    ALT 27 02/03/2020    LABGLOM > 60.0 02/03/2020    AGRATIO 1.3 03/04/2018    GLOB 2.9 03/04/2018     Lab Results   Component Value Date    CHOL 185 02/03/2020    CHOL 225 (H) 04/01/2019    CHOL 249 (H) 04/16/2018     Lab Results   Component Value Date    TRIG 68 02/03/2020    TRIG 104 04/01/2019    TRIG 168 04/16/2018     Lab Results   Component Value Date    HDL 73 (H) 02/03/2020    HDL 53 12/01/2016     Lab Results   Component Value Date    LDLCALC 98.4 02/03/2020    LDLCALC 150.2 04/01/2019    LDLCALC 164.4 (H) 04/16/2018     Lab Results   Component Value Date    VLDL 14 02/03/2020    VLDL 21 04/01/2019    VLDL 34 04/16/2018     Lab Results   Component Value Date    CHOLHDLRATIO 2.53 02/03/2020    CHOLHDLRATIO 4.2 04/01/2019    CHOLHDLRATIO 4.9 (H) 04/16/2018           Physical Exam  Constitutional:       Appearance: She is well-developed. Eyes:      Pupils: Pupils are equal, round, and reactive to light. Cardiovascular:      Rate and Rhythm: Normal rate and regular rhythm. Heart sounds: Normal heart sounds. Pulmonary:      Effort: Pulmonary effort is normal.      Breath sounds: Normal breath sounds. Abdominal:      General: Bowel sounds are normal.      Palpations: Abdomen is soft. Skin:     General: Skin is warm and dry. Comments: Negative for open/nonhealing wounds. Negative for lipohypertrophy. Neurological:      Mental Status: She is alert and oriented to person, place, and time. ASSESSMENT/PLAN:     Diagnosis Orders   1. Type 2 diabetes mellitus without complication, without long-term current use of insulin ([facility]     2. Diabetes education, encounter for     3. Mixed hyperlipidemia     4. Essential hypertension     5. History of bulimia     6. BMI 31.0-31.9,adult       No orders of the defined types were placed in this encounter. No orders of the defined types were placed in this encounter. Requested Prescriptions      No prescriptions requested or ordered in this encounter       1. Type 2 diabetes mellitus without complication, without long-term current use of insulin ([facility]  2. Diabetes education, encounter for  - Stable  HbA1C goal is less than 7% and blood sugars are improving.  - Encouraged patient to check BS 1 times per day. Fasting blood glucose goal is 70-130mg/dl and postprandial blood sugar goal is less than 180 mg/dl. -Diabetic foot exam up-to-date: Yes  -Diabetic retinal exam up-to-date: Yes  - Labs reviewed includes: a1c 5.6 % gfr >60 . Repeat labs due every 6 months while controlled.   -We discussed in great detail dietary modifications they can make to better improve their blood sugars. -follow up diabetes education completed, all questions answered.  -no changes  -continue to work on healthy food choises,   Alternate with 1200 kcal per day with 1500 kcal per day       Discussed signs and symptoms of hyper/hypoglycemia and how to treat. Encouraged 150 minutes of physical activity per week. Follow a low carbohydrate diet consuming 45 grams of carbohydrates at breakfast, lunch and dinner with two separate snacks [id number] grams of carbohydrates. Encouraged at least 7 hours of sleep. The patient was informed of the goals of diabetes management. This can only be accomplished by watching their diet and exercise levels. We certainly use medicines to help attain these goals.

## 2021-02-17 ENCOUNTER — OFFICE VISIT (OUTPATIENT)
Dept: FAMILY MEDICINE CLINIC | Age: 56
End: 2021-02-17
Payer: COMMERCIAL

## 2021-02-17 VITALS
BODY MASS INDEX: 33.11 KG/M2 | HEART RATE: 76 BPM | DIASTOLIC BLOOD PRESSURE: 80 MMHG | WEIGHT: 193 LBS | SYSTOLIC BLOOD PRESSURE: 110 MMHG | OXYGEN SATURATION: 95 %

## 2021-02-17 DIAGNOSIS — F41.9 ANXIETY: ICD-10-CM

## 2021-02-17 DIAGNOSIS — E78.2 MIXED HYPERLIPIDEMIA: ICD-10-CM

## 2021-02-17 DIAGNOSIS — Z12.31 VISIT FOR SCREENING MAMMOGRAM: ICD-10-CM

## 2021-02-17 DIAGNOSIS — E11.9 TYPE 2 DIABETES MELLITUS WITHOUT COMPLICATION, WITHOUT LONG-TERM CURRENT USE OF INSULIN (HCC): Primary | ICD-10-CM

## 2021-02-17 DIAGNOSIS — I10 ESSENTIAL HYPERTENSION: ICD-10-CM

## 2021-02-17 DIAGNOSIS — F50.2 BULIMIA: ICD-10-CM

## 2021-02-17 LAB — HBA1C MFR BLD: 5.5 %

## 2021-02-17 PROCEDURE — 99214 OFFICE O/P EST MOD 30 MIN: CPT | Performed by: FAMILY MEDICINE

## 2021-02-17 PROCEDURE — 83036 HEMOGLOBIN GLYCOSYLATED A1C: CPT | Performed by: FAMILY MEDICINE

## 2021-02-17 RX ORDER — FLUOXETINE HYDROCHLORIDE 40 MG/1
40 CAPSULE ORAL DAILY
Qty: 90 CAPSULE | Refills: 3 | Status: SHIPPED | OUTPATIENT
Start: 2021-02-17 | End: 2022-01-19 | Stop reason: ALTCHOICE

## 2021-02-17 ASSESSMENT — PATIENT HEALTH QUESTIONNAIRE - PHQ9
1. LITTLE INTEREST OR PLEASURE IN DOING THINGS: 0
SUM OF ALL RESPONSES TO PHQ QUESTIONS 1-9: 0

## 2021-02-17 NOTE — PROGRESS NOTES
[address] [facility]  [address]  Dept: [phone]  Dept HPS:[phone]    Viviana Muniz is a 63 y.o. female who presents today for her medical conditions/complaints as notedbelow. Viviana Muniz is c/o of Diabetes (check up)      HPI:     HPI    Viviana Muniz is a 63 y.o. female who presents for follow-up of hypertension, diabetes, and hyperlipidemia. She indicates that she is feeling well and denies any symptoms referable to her elevated blood pressure or diabetes. Specifically denies chest pain, palpitations, dyspnea, peripheral edema, thirst, frequent urination, and blurred vision. Current medication regimen is as listed below. She denies any side effects of medication, and has been taking it regularly. Home glucose readings have been running low missael when she is exercising-- can feel the sugar dip. Checks blood sugarsrarely. Last eye exam was6/2020 withEliceo. Diabetic complications includenone. shehas been exercising regularly drumming and dance class. Hasbeen following a diabeticdiet. Has had more knee pain recently. Seemed more sore after one of her dance classes. The right knee. Stiff- no swelling at all. Feel tight on the inside missael. Stiff in the morning, no redness. Using OTC pain relievers. Ice helps also. Mood not as good as in the past, more binging and purging. She has continued with her counseling on a regular basis. She cannot really identify any particular triggers. She has continued to take her fluoxetine at the 20 mg daily.       BP Readings from Last 3 Encounters:   02/17/21 110/80   10/06/20 122/68   08/17/20 126/66          (goal 120/80)    Wt Readings from Last 3 Encounters:   02/17/21 193 lb (87.5 kg)   10/06/20 181 lb (82.1 kg)   08/17/20 175 lb (79.4 kg)        Past Medical History:   Diagnosis Date    History of bulimia     in 19 and 30's    Hyperlipidemia     Hypertension     Type 2 diabetes mellitus without complication, without long-term current use of insulin ([facility] [address] 7/29/2018      Past Surgical History:   Procedure Laterality Date    ANKLE SURGERY      scope    CERVICAL POLYP REMOVAL  01/25/2017    CHOLECYSTECTOMY      DILATION AND CURETTAGE OF UTERUS  01/2017    due to menorrhagia    TUBAL LIGATION         Family History   Problem Relation Age of Onset    Arthritis Mother     Cancer Mother         endometrial       Social History     Tobacco Use    Smoking status: Never Smoker    Smokeless tobacco: Never Used   Substance Use Topics    Alcohol use: No      Current Outpatient Medications   Medication Sig Dispense Refill    FLUoxetine (PROZAC) 40 MG capsule Take 1 capsule by mouth daily 90 capsule 3    atorvastatin (LIPITOR) 20 MG tablet Take 1 tablet by mouth once daily 90 tablet 3    lisinopril (PRINIVIL;ZESTRIL) 5 MG tablet Take 1 tablet by mouth once daily 90 tablet 3    metFORMIN (GLUCOPHAGE-XR) 500 MG extended release tablet Take 1 tablet by mouth daily (with breakfast) 180 tablet 3    glucose blood VI test strips (ASCENSIA AUTODISC VI;ONE TOUCH ULTRA TEST VI) strip 1 each by In Vitro route daily As needed. 100 each 3    Lancets MISC Check sugars daily 100 each 3     No current facility-administered medications for this visit.       No Known Allergies    Health Maintenance   Topic Date Due    HIV screen  12/31/1979    Hepatitis B vaccine (1 of 3 - Risk 3-dose series) 12/31/1983    DTaP/Tdap/Td vaccine (1 - Tdap) 12/31/1983    Colon cancer screen colonoscopy  12/31/2014    Cervical cancer screen  02/17/2023 (Originally 1/25/2020)    Diabetic retinal exam  06/25/2021    Breast cancer screen  08/20/2021    Diabetic foot exam  02/17/2022    A1C test (Diabetic or Prediabetic)  02/17/2022    Diabetic microalbuminuria test  02/18/2022    Lipid screen  02/18/2022    Potassium monitoring  02/18/2022    Creatinine monitoring  02/18/2022    Flu vaccine  Completed    Shingles Vaccine  Completed    Pneumococcal 0-64 years Vaccine  Completed    Hepatitis C screen  Completed    Hepatitis A vaccine  Aged Out    Hib vaccine  Aged Out    Meningococcal (ACWY) vaccine  Aged Out       Subjective:      Review of Systems    Objective:     /80 (Site: Left Upper Arm, Position: Sitting, Cuff Size: Large Adult)   Pulse 76   Wt 193 lb (87.5 kg)   SpO2 95%   BMI 33.11 kg/m²     Physical Exam  Vitals signs and nursing note reviewed. Constitutional:       Appearance: She is well-developed. HENT:      Head: Normocephalic and atraumatic. Eyes:      Conjunctiva/sclera: Conjunctivae normal.   Neck:      Musculoskeletal: Normal range of motion and neck supple. Thyroid: No thyromegaly. Vascular: No JVD. Cardiovascular:      Rate and Rhythm: Normal rate and regular rhythm. Heart sounds: Normal heart sounds. No murmur. No friction rub. No gallop. Pulmonary:      Effort: Pulmonary effort is normal. No respiratory distress. Breath sounds: Normal breath sounds. Abdominal:      General: Bowel sounds are normal.      Palpations: Abdomen is soft. There is no mass. Tenderness: There is no abdominal tenderness. Musculoskeletal:      Right lower leg: No edema. Left lower leg: No edema. Lymphadenopathy:      Cervical: No cervical adenopathy. Skin:     General: Skin is warm. Capillary Refill: Capillary refill takes less than 2 seconds. Neurological:      General: No focal deficit present. Mental Status: She is alert and oriented to person, place, and time. Psychiatric:         Mood and Affect: Mood normal.         Behavior: Behavior normal.         Thought Content: Thought content normal.         Judgment: Judgment normal.         Assessment/Plan:   1.  Type 2 diabetes mellitus without complication, without long-term current use of insulin ([facility])  Assessment & Plan:  Diabetes has been well controlled and she has done a wonderful job with her exercise and diet. Encouraged her to continue in this respect. Orders:  -     POCT glycosylated hemoglobin (Hb A1C)  -     Comprehensive Metabolic Panel; Future  -      DIABETES FOOT EXAM  -     Microalbumin, Ur; Future  2. Mixed hyperlipidemia  Assessment & Plan:  Asymptomatic labs ordered for the next week or so. Orders:  -     Lipid Panel; Future  3. Anxiety  4. Visit for screening mammogram  -     [facility] LOLY DIGITAL SCREEN BILATERAL; Future  5. Bulimia  Assessment & Plan:  Continue at this time with the fluoxetine but will increase it to 40 mg for the anxiety and the OCD component of her eating disorder. Definitely encouraged her to continue with her counselor on a regular basis. 6. Essential hypertension  Assessment & Plan:  Hypertension is well controlled. No changes in her current medications. For the knee pain- Voltaren gel OTC can be used regularly for the knee pain 2 times daily. If this is not improving would consider referral to physical therapy. Lab Results   Component Value Date    WBC 5.18 03/04/2018    HGB 13.3 03/04/2018    HCT 40.2 03/04/2018     03/04/2018    CHOL 177 02/18/2021    TRIG 87 02/18/2021    HDL 78 02/18/2021    ALT 26 02/18/2021    AST 24 02/18/2021     02/18/2021    K 4.2 02/18/2021     02/18/2021    CREATININE 0.61 02/18/2021    BUN 12 02/18/2021    CO2 30 02/18/2021    TSH 1.79 04/16/2018    LABA1C 5.5 02/17/2021    LABA1C 5.6 07/07/2020    LABA1C 5.7 03/03/2020    LABMICR CANNOT BE CALCULATED 02/18/2021       Return in about 6 months (around 8/17/2021) for HTN, DM follow up. Patient given educational materials - see patientinstructions. Discussed use, benefit, and side effects of prescribed medications. All patient questions answered. Pt voiced understanding. Reviewed health maintenance. Instructed to continue current medications, diet andexercise. Patient agreed with treatment plan. Follow up as directed.      (Please note that portions

## 2021-02-18 ENCOUNTER — HOSPITAL ENCOUNTER (OUTPATIENT)
Age: 56
Setting detail: SPECIMEN
Discharge: HOME | End: 2021-02-18
Payer: COMMERCIAL

## 2021-02-18 DIAGNOSIS — E11.9 TYPE 2 DIABETES MELLITUS WITHOUT COMPLICATION, WITHOUT LONG-TERM CURRENT USE OF INSULIN (HCC): ICD-10-CM

## 2021-02-18 DIAGNOSIS — E78.2 MIXED HYPERLIPIDEMIA: ICD-10-CM

## 2021-02-18 LAB
ALBUMIN SERPL-MCNC: 4.4 G/DL (ref 3.5–5.2)
ALBUMIN/GLOBULIN RATIO: 1.6 (ref 1–2.5)
ALP BLD-CCNC: 49 U/L (ref 35–104)
ALT SERPL-CCNC: 26 U/L (ref 5–33)
ANION GAP SERPL CALCULATED.3IONS-SCNC: 9 MMOL/L (ref 9–17)
AST SERPL-CCNC: 24 U/L
BILIRUB SERPL-MCNC: 0.75 MG/DL (ref 0.3–1.2)
BUN BLDV-MCNC: 12 MG/DL (ref 6–20)
BUN/CREAT BLD: 20 (ref 9–20)
CALCIUM SERPL-MCNC: 9.6 MG/DL (ref 8.6–10.4)
CHLORIDE BLD-SCNC: 102 MMOL/L (ref 98–107)
CHOLESTEROL/HDL RATIO: 2.3
CHOLESTEROL: 177 MG/DL
CO2: 30 MMOL/L (ref 20–31)
CREAT SERPL-MCNC: 0.61 MG/DL (ref 0.5–0.9)
CREATININE URINE: 58.7 MG/DL (ref 28–217)
GFR AFRICAN AMERICAN: >60 ML/MIN
GFR NON-AFRICAN AMERICAN: >60 ML/MIN
GFR SERPL CREATININE-BSD FRML MDRD: ABNORMAL ML/MIN/{1.73_M2}
GFR SERPL CREATININE-BSD FRML MDRD: ABNORMAL ML/MIN/{1.73_M2}
GLUCOSE BLD-MCNC: 118 MG/DL (ref 70–99)
HDLC SERPL-MCNC: 78 MG/DL
LDL CHOLESTEROL: 82 MG/DL (ref 0–130)
MICROALBUMIN/CREAT 24H UR: <12 MG/L
MICROALBUMIN/CREAT UR-RTO: NORMAL MCG/MG CREAT
POTASSIUM SERPL-SCNC: 4.2 MMOL/L (ref 3.7–5.3)
SODIUM BLD-SCNC: 141 MMOL/L (ref 135–144)
TOTAL PROTEIN: 7.2 G/DL (ref 6.4–8.3)
TRIGL SERPL-MCNC: 87 MG/DL
VLDLC SERPL CALC-MCNC: NORMAL MG/DL (ref 1–30)

## 2021-02-18 PROCEDURE — 36415 COLL VENOUS BLD VENIPUNCTURE: CPT

## 2021-02-18 PROCEDURE — 80061 LIPID PANEL: CPT

## 2021-02-18 PROCEDURE — 82043 UR ALBUMIN QUANTITATIVE: CPT

## 2021-02-18 PROCEDURE — 82570 ASSAY OF URINE CREATININE: CPT

## 2021-02-18 PROCEDURE — 80053 COMPREHEN METABOLIC PANEL: CPT

## 2021-02-21 PROBLEM — F50.2 BULIMIA: Status: ACTIVE | Noted: 2021-02-21

## 2021-02-21 PROBLEM — F50.20 BULIMIA: Status: ACTIVE | Noted: 2021-02-21

## 2021-02-21 NOTE — ASSESSMENT & PLAN NOTE
Continue at this time with the fluoxetine but will increase it to 40 mg for the anxiety and the OCD component of her eating disorder. Definitely encouraged her to continue with her counselor on a regular basis.

## 2021-02-21 NOTE — ASSESSMENT & PLAN NOTE
Diabetes has been well controlled and she has done a wonderful job with her exercise and diet. Encouraged her to continue in this respect.

## 2021-04-06 ENCOUNTER — OFFICE VISIT (OUTPATIENT)
Dept: DIABETES SERVICES | Age: 56
End: 2021-04-06
Payer: COMMERCIAL

## 2021-04-06 VITALS
BODY MASS INDEX: 32.32 KG/M2 | DIASTOLIC BLOOD PRESSURE: 82 MMHG | HEIGHT: 65 IN | SYSTOLIC BLOOD PRESSURE: 132 MMHG | WEIGHT: 194 LBS | RESPIRATION RATE: 14 BRPM | HEART RATE: 60 BPM

## 2021-04-06 DIAGNOSIS — E78.2 MIXED HYPERLIPIDEMIA: ICD-10-CM

## 2021-04-06 DIAGNOSIS — I10 ESSENTIAL HYPERTENSION: ICD-10-CM

## 2021-04-06 DIAGNOSIS — E11.9 TYPE 2 DIABETES MELLITUS WITHOUT COMPLICATION, WITHOUT LONG-TERM CURRENT USE OF INSULIN (HCC): Primary | ICD-10-CM

## 2021-04-06 DIAGNOSIS — Z71.89 DIABETES EDUCATION, ENCOUNTER FOR: ICD-10-CM

## 2021-04-06 PROCEDURE — 99214 OFFICE O/P EST MOD 30 MIN: CPT | Performed by: NURSE PRACTITIONER

## 2021-04-06 RX ORDER — METFORMIN HYDROCHLORIDE 500 MG/1
500 TABLET, EXTENDED RELEASE ORAL
Qty: 180 TABLET | Refills: 3
Start: 2021-04-06 | End: 2021-04-12 | Stop reason: ALTCHOICE

## 2021-04-06 ASSESSMENT — ENCOUNTER SYMPTOMS
RESPIRATORY NEGATIVE: 1
ABDOMINAL PAIN: 0
DIARRHEA: 0
BLURRED VISION: 0
SHORTNESS OF BREATH: 0
VISUAL CHANGE: 0

## 2021-04-06 NOTE — PATIENT INSTRUCTIONS
Try low calorie one day and then the next day increase calorie intake to 1200 calories using whole foods. Create a meal plan.      Decrease metformin to one tab in the am     Check blood sugar when you are feeling low  Goal is

## 2021-06-01 ENCOUNTER — OFFICE VISIT (OUTPATIENT)
Dept: DIABETES SERVICES | Age: 56
End: 2021-06-01
Payer: COMMERCIAL

## 2021-06-01 VITALS
WEIGHT: 201.6 LBS | HEART RATE: 60 BPM | RESPIRATION RATE: 14 BRPM | DIASTOLIC BLOOD PRESSURE: 77 MMHG | SYSTOLIC BLOOD PRESSURE: 118 MMHG | BODY MASS INDEX: 33.59 KG/M2 | HEIGHT: 65 IN

## 2021-06-01 DIAGNOSIS — I10 ESSENTIAL HYPERTENSION: ICD-10-CM

## 2021-06-01 DIAGNOSIS — E78.2 MIXED HYPERLIPIDEMIA: ICD-10-CM

## 2021-06-01 DIAGNOSIS — E11.9 TYPE 2 DIABETES MELLITUS WITHOUT COMPLICATION, WITHOUT LONG-TERM CURRENT USE OF INSULIN (HCC): Primary | ICD-10-CM

## 2021-06-01 LAB — HBA1C MFR BLD: 5.7 %

## 2021-06-01 PROCEDURE — 99214 OFFICE O/P EST MOD 30 MIN: CPT | Performed by: NURSE PRACTITIONER

## 2021-06-01 PROCEDURE — 83036 HEMOGLOBIN GLYCOSYLATED A1C: CPT | Performed by: NURSE PRACTITIONER

## 2021-06-01 ASSESSMENT — ENCOUNTER SYMPTOMS
DIARRHEA: 0
RESPIRATORY NEGATIVE: 1
SHORTNESS OF BREATH: 0
ABDOMINAL PAIN: 0

## 2021-06-01 NOTE — PROGRESS NOTES
[facility]  [address] DIABETES MGMT  A DEPARTMENT OF [place] [address]  [address] [facility], [address]  [facility] [address] [phone]        HISTORY:    Divya Candelaria presents today for evaluation and management of:  Chief Complaint   Patient presents with    Diabetes     2 mo f/u        HPI    Interval History:    Past DM Medications   Metformin- hypoglycemia     Current Diabetic Medications  none        DKA episodes: 0    7/7/2020  She is here for follow up dm. She is not counting calories anymore but eat relatively the same foods everyday, protein shakes and fish/chicken with veggies. She still struggles with thinking about food and weight loss all the time. She sees a counselor and they are working through these thoughts.      10/06/20   She is watching her food intake. Not documenting our counting just eats the same thing everyday. She did go on vacation and ate more than normal. She still follows with counseling for history of eating disorder.   Diet:2 shakes per day, sensible dinner.   Exercise: Daily, drumming, walking, dance class.   BS testing: not testing   Issues: denies      04/06/21   At previous visit dm counseling was provided. She is not testing blood sugars she is following with counceling for eating disorder. She has not increased calorie intake as we discussed she is fearful she will lose control and binge. She also states when she is exercising she will sometimes feel hypoglycemic she will stop and have a piece of candy. She has not test during this time. Diet:  1 shake per day, not documenting food intake but she eats the same every day, egg/yougurt for breakfast or nothing, shake for lunch, salad for dinner. No snacks. 700 calories or less per day. Exercise: Daily, drumming, walking, dance class.   BS testing: none  Issues: denies     06/01/21   At previous visit dm counceling was provided. Yesterday she sprained her ankle.  She does states sometimes she feels hypoglycemia however when she checks bs are only 70-80. Diet: low carb low calorie  Exercise: exercise classes  BS testing: occ when she feels low, reads 70-80  Issues: denies     High cholesterol-  Takes lipitor and denies any adverse effects with its use.  Watches diet and exercise.      Hypertension-  Takes lisinopril and denies any adverse effects with their use. Watches diet and exercise. Denies any chest pain,or edema.  Follows with cardiology:No.     Obesity- Working on weight loss.  Her stated ideal weight is 135 lbs  She does have a history of bulimia. She does states she is mentally preoccupied with her weight loss.  She is seeing a counselor.  Weight has gone up      Past Medical History:   Diagnosis Date    History of bulimia     in 20 and 28's    Hyperlipidemia     Hypertension     Type 2 diabetes mellitus without complication, without long-term current use of insulin ([facility] [address] 7/29/2018     Family History   Problem Relation Age of Onset    Arthritis Mother     Cancer Mother         endometrial     Social History     Tobacco Use    Smoking status: Never Smoker    Smokeless tobacco: Never Used   Substance Use Topics    Alcohol use: No    Drug use: Not on file     No Known Allergies    MEDICATIONS:  Current Outpatient Medications   Medication Sig Dispense Refill    FLUoxetine (PROZAC) 40 MG capsule Take 1 capsule by mouth daily 90 capsule 3    atorvastatin (LIPITOR) 20 MG tablet Take 1 tablet by mouth once daily 90 tablet 3    lisinopril (PRINIVIL;ZESTRIL) 5 MG tablet Take 1 tablet by mouth once daily 90 tablet 3    glucose blood VI test strips (ASCENSIA AUTODISC VI;ONE TOUCH ULTRA TEST VI) strip 1 each by In Vitro route daily As needed. 100 each 3    Lancets MISC Check sugars daily 100 each 3     No current facility-administered medications for this visit. Review ofSymptoms:  Review of Systems   Constitutional: Positive for fatigue. Negative for unexpected weight change.    Eyes: mg/dl.   -Diabetic foot exam up-to-date: Yes  -Diabetic retinal exam up-to-date: Yes  - Labs reviewed includes: Most recent A1C 5.7%, Microalb/Crt. Ratio 0 mcg/mg creat and GFR >60 mL/min. Repeat labs due in 3 months.    -We discussed in great detail dietary modifications they can make to better improve their blood sugars. -follow up diabetes education completed, all questions answered.  -no med changes at this time. Will focus on ankle sprain and chair exercises. -increase protein prior to physical activity. Discussed signs and symptoms of hyper/hypoglycemia and how to treat. Encouraged 150 minutes of physical activity per week. Follow a low carbohydrate diet. Encouraged at least 7 hours of sleep. The patient was informed of the goals of diabetes management. This can only be accomplished by watching their diet and exercise levels. We certainly use medicines to help attain these goals. The consequences of not controlling blood sugars were discussed. These include blindness, heart disease, stroke, kidney disease, and possibly need for dialysis. They were told to be careful with their foot care as diabetics often have nerve damage, infections and risk for limb amutations . They also need a dilated eye exam yearly. We discussed the issues of diet, exercise, medication, complication avoidance, reviewed the signs and symptoms of diabetes, hypoglycemic episodes, significance of HbA1C.       - POCT glycosylated hemoglobin (Hb A1C)    2. Mixed hyperlipidemia  stable, lipid panel reviewed, continue current medications. Diet and exercise      3. Essential hypertension   stable, continue current medications. Diet and exercise Seek emergent care if chest pain develops. 4. BMI 31.0-31.9,adult  Reduce calories and increase physical activity to achieve a slow and steady weight loss to improve blood pressure, cholesterol and diabetes. Answered all patient questions.  Agrees to follow plan of care and to follow up in 3 months, sooner if needed. Call office if unexplained blood sugars less than 70 occur or above 400. Call office or access [facility] with any further questions or concerns. Be sure to bring glucometer/food log at next appointment. Total time spent reviewing chart, labs, counseling patient and documenting on the date of the encounter: 30 min.       Electronically signed by IRENE Bradshaw CNP on 6/1/2021 at 9:02 AM      (Please note that portions of this note were completed with a voice-recognition program. Efforts were made to edit the dictation but occasionally words are mis-transcribed.)

## 2021-07-12 ENCOUNTER — OFFICE VISIT (OUTPATIENT)
Dept: OBGYN CLINIC | Facility: CLINIC | Age: 56
End: 2021-07-12
Payer: COMMERCIAL

## 2021-07-12 VITALS
WEIGHT: 178 LBS | BODY MASS INDEX: 30.39 KG/M2 | SYSTOLIC BLOOD PRESSURE: 113 MMHG | HEIGHT: 64 IN | TEMPERATURE: 97.9 F | HEART RATE: 93 BPM | DIASTOLIC BLOOD PRESSURE: 73 MMHG

## 2021-07-12 DIAGNOSIS — S92.421A CLOSED DISPLACED FRACTURE OF DISTAL PHALANX OF RIGHT GREAT TOE, INITIAL ENCOUNTER: Primary | ICD-10-CM

## 2021-07-12 PROCEDURE — 99203 OFFICE O/P NEW LOW 30 MIN: CPT | Performed by: STUDENT IN AN ORGANIZED HEALTH CARE EDUCATION/TRAINING PROGRAM

## 2021-07-12 RX ORDER — HYDROXYCHLOROQUINE SULFATE 200 MG/1
200 TABLET, FILM COATED ORAL 2 TIMES DAILY
COMMUNITY
Start: 2021-07-01

## 2021-07-12 RX ORDER — DOXYCYCLINE HYCLATE 50 MG/1
50 CAPSULE ORAL 2 TIMES DAILY
COMMUNITY

## 2021-07-12 NOTE — PROGRESS NOTES
1  Closed displaced fracture of distal phalanx of right great toe, initial encounter       No orders of the defined types were placed in this encounter  Imaging Studies (I personally reviewed images in PACS and report):    Prior imagin  X-ray left great toe 2021: Stable alignment of 1st distal phalanx great toe fracture  Mild OA of 1st MTP joint  Healed osteotomy 1st metatarsal  Arthropathy of 2nd and 3rd MTP joints  2  X-ray left foot 2021: Non-displaced fracture at the dorsal base of the 1st distal phalanx with intra-articular extension    IMPRESSION:  Acute left great toe pain s/p injury - non-displaced fracture at the dorsal base of 1st distal phalanx   - Patient currently on antibiotics (doxycycline) for treatment of wound of left great toe: wound today appears non-erythematous, no edema, no drainage and appropriately healing  Date of Injury: 2021  Follow up interval: 4 weeks, 3 days       PLAN:  - Clinical exam and radiographic imaging reviewed with patient today, with impression as per above  I reviewed prior imaging with patient and reassured her that her fracture continues to be aligned and healing appropriately  I discussed that since it has been at least 4 weeks since her fracture, it it typically stable at this point of time  I discussed that she has degenerative changes that could contribute to prolonged aching given her prior surgery of this toe  I recommended PRN use of topical/oral NSAIDs, icing, tylenol  I did offer a post-op shoe to ambulate, but patient feels she can tolerate using regular footwear, which is reasonable  - I counseled for her to finish the antibiotics prescribed by her PCP and to follow up with them about final blood cultures  Return if symptoms worsen or fail to improve        CHIEF COMPLAINT:  Left foot pain/injury    HPI:  Yulia Mcintyre is a 63 y o  female  who presents for       Visit 2021 :  Initial evaluation of left foot pain s/p injury: patient reports a precipitating injury on 6/11/2021- she was walking on a level surface and impacted her great toe against an object  She denies cracking/popping, but had sudden pain and bruising  She states she attempted to treat conservatively with tylenol/nsaid, icing, but continued to have pain and saw her PCP the following Monday (6/14/2021) in which she had X-ray imaging done and was told it was a non-displaced fracture  Patient states she was told to marian tape her toes  Patient states the following weeks, she felt sick and was placed on antibiotics  She also had bloodwork/blood cultures done but did not get final results    Currently, she reports pain has progressively improved, but has intermittent aching of her great toe at times  She is able to wear regular footwear as she states she was told her fracture had healed  States swelling has progressively improved resolved  She states toe is not as red as it used to be  Denies drainage from her great toe wound  Denies fevers/chills, nausea, vomiting, diarrhea, diaphoresis, and other ROS as per below  H/o prior surgeries for b/l bunionectomy and a left foot 2nd toe surgery "to fix it's alignment" per patient  She also has a h/o rheamtoid arhtitis      Review of Systems   Constitutional: Negative for chills, diaphoresis, fatigue and fever  HENT: Negative for sore throat  Respiratory: Negative for cough and shortness of breath  Gastrointestinal: Negative for abdominal pain, diarrhea, nausea and vomiting  Musculoskeletal:        As per HPI   Skin: Negative for rash and wound     Neurological:        As per HPI         Medical, Surgical, Family, and Social History    Past Medical History:   Diagnosis Date    Hypertension     Hypothyroid      Past Surgical History:   Procedure Laterality Date    ANTERIOR CRUCIATE LIGAMENT REPAIR Left 2005    BUNIONECTOMY Left     ENDOMETRIAL ABLATION      FOOT SURGERY Bilateral     ROTATOR CUFF REPAIR Left      Social History   Social History     Substance and Sexual Activity   Alcohol Use Yes    Comment: occasional     Social History     Substance and Sexual Activity   Drug Use Not Currently     Social History     Tobacco Use   Smoking Status Never Smoker   Smokeless Tobacco Never Used     History reviewed  No pertinent family history  Allergies   Allergen Reactions    Molds & Smuts      Seasonal per patient    Sulfa Antibiotics Rash          Physical Exam  /73   Pulse 93   Temp 97 9 °F (36 6 °C)   Ht 5' 4" (1 626 m)   Wt 80 7 kg (178 lb)   BMI 30 55 kg/m²     Constitutional:  see vital signs  Gen: well-developed, normocephalic/atraumatic, well-groomed  Eyes: No inflammation or discharge of conjunctiva or lids; sclera clear   Pharynx: no inflammation, lesion, or mass of lips  Neck: supple, no masses, non-distended  MSK: no inflammation, lesion, mass, or clubbing of nails and digits except for other than mentioned below  SKIN: no visible rashes or skin lesions  Pulmonary/Chest: Effort normal  No respiratory distress  NEURO: cranial nerves grossly intact  PSYCH:  Alert and oriented to person, place, and time; recent and remote memory intact; mood normal, no depression, anxiety, or agitation, judgment and insight good and intact     Ortho Exam  Left foot exam   - scabbed lesion over the anterior aspect of the great toe  No erythema, increased warmth, swelling, drainage     Tenderness: +distal phalanx of great toe (mild tenderness)  ROM Toes extension: intact  ROM Toes flexion: intact  Strength Toes: 5/5 flex, ext  Sensation intact  Capillary refill intact    Gait: normal without antalgia

## 2021-07-27 DIAGNOSIS — I10 ESSENTIAL HYPERTENSION: ICD-10-CM

## 2021-07-28 NOTE — TELEPHONE ENCOUNTER
Radha Pablo is requesting a refill on the following medication(s):  Requested Prescriptions     Pending Prescriptions Disp Refills    lisinopril (PRINIVIL;ZESTRIL) 5 MG tablet [Pharmacy Med Name: Lisinopril 5 MG Oral Tablet] 90 tablet 1     Sig: Take 1 tablet by mouth once daily       Last Visit Date (If Applicable):  5/83/00    Next Visit Date:    Visit date not found

## 2021-07-29 RX ORDER — LISINOPRIL 5 MG/1
TABLET ORAL
Qty: 90 TABLET | Refills: 1 | Status: SHIPPED | OUTPATIENT
Start: 2021-07-29 | End: 2021-08-30 | Stop reason: ALTCHOICE

## 2021-08-26 ENCOUNTER — OFFICE VISIT (OUTPATIENT)
Dept: FAMILY MEDICINE CLINIC | Age: 56
End: 2021-08-26
Payer: COMMERCIAL

## 2021-08-26 VITALS
HEART RATE: 78 BPM | SYSTOLIC BLOOD PRESSURE: 114 MMHG | DIASTOLIC BLOOD PRESSURE: 68 MMHG | OXYGEN SATURATION: 99 % | WEIGHT: 200 LBS | BODY MASS INDEX: 33.28 KG/M2

## 2021-08-26 DIAGNOSIS — F41.8 DEPRESSION WITH ANXIETY: Primary | ICD-10-CM

## 2021-08-26 DIAGNOSIS — E11.9 TYPE 2 DIABETES MELLITUS WITHOUT COMPLICATION, WITHOUT LONG-TERM CURRENT USE OF INSULIN (HCC): ICD-10-CM

## 2021-08-26 PROCEDURE — 99214 OFFICE O/P EST MOD 30 MIN: CPT | Performed by: FAMILY MEDICINE

## 2021-08-26 RX ORDER — FLUOXETINE 10 MG/1
10 CAPSULE ORAL DAILY
Qty: 30 CAPSULE | Refills: 3 | Status: SHIPPED | OUTPATIENT
Start: 2021-08-26 | End: 2021-12-03 | Stop reason: ALTCHOICE

## 2021-08-26 SDOH — ECONOMIC STABILITY: TRANSPORTATION INSECURITY
IN THE PAST 12 MONTHS, HAS LACK OF TRANSPORTATION KEPT YOU FROM MEETINGS, WORK, OR FROM GETTING THINGS NEEDED FOR DAILY LIVING?: NO

## 2021-08-26 SDOH — ECONOMIC STABILITY: TRANSPORTATION INSECURITY
IN THE PAST 12 MONTHS, HAS THE LACK OF TRANSPORTATION KEPT YOU FROM MEDICAL APPOINTMENTS OR FROM GETTING MEDICATIONS?: NO

## 2021-08-26 SDOH — ECONOMIC STABILITY: FOOD INSECURITY: WITHIN THE PAST 12 MONTHS, THE FOOD YOU BOUGHT JUST DIDN'T LAST AND YOU DIDN'T HAVE MONEY TO GET MORE.: NEVER TRUE

## 2021-08-26 SDOH — ECONOMIC STABILITY: FOOD INSECURITY: WITHIN THE PAST 12 MONTHS, YOU WORRIED THAT YOUR FOOD WOULD RUN OUT BEFORE YOU GOT MONEY TO BUY MORE.: NEVER TRUE

## 2021-08-26 ASSESSMENT — SOCIAL DETERMINANTS OF HEALTH (SDOH): HOW HARD IS IT FOR YOU TO PAY FOR THE VERY BASICS LIKE FOOD, HOUSING, MEDICAL CARE, AND HEATING?: NOT HARD AT ALL

## 2021-08-26 NOTE — PROGRESS NOTES
[address] [facility]  [address]  Dept: [phone]  Dept MZX:[phone]    José Miguel Fried is a 63 y.o. female who presents today for her medical conditions/complaints as notedbelow. José Miguel Fried is c/o of Depression (would like to discuss increasing prozac)      HPI:     HPI    Therapist recommended she increase her medications. She has extra stress and feels she needs help with them. Not sleeping very well. Concentration is poor. Mind is all over. Feeling down and sad. Is feeling a little anxious. Worries about what she eats, her weight what she looks like. Does enjoy working out. Tired a lot but not sleeping for more than a few hours overall. Feels like she is letting people down. She has had thoughts about hurting himself but has not tried anything. She would take too many pills as her plan. She thinks she is safe at this time but cannot guarantee in the future. She \"feels like her 's been through so much with me already\"    Does eat more than she would like but not like she used to. No purging.  Has     BP Readings from Last 3 Encounters:   08/26/21 114/68   06/01/21 118/77   04/06/21 132/82          (goal 120/80)    Wt Readings from Last 3 Encounters:   08/26/21 200 lb (90.7 kg)   06/01/21 201 lb 9.6 oz (91.4 kg)   04/06/21 194 lb (88 kg)        Past Medical History:   Diagnosis Date    History of bulimia     in 19 and 30's    Hyperlipidemia     Hypertension     Type 2 diabetes mellitus without complication, without long-term current use of insulin ([facility] [address] 7/29/2018      Past Surgical History:   Procedure Laterality Date    ANKLE SURGERY      scope    CERVICAL POLYP REMOVAL  01/25/2017    CHOLECYSTECTOMY      DILATION AND CURETTAGE OF UTERUS  01/2017    due to menorrhagia    TUBAL LIGATION         Family History   Problem Relation Age of Onset    Arthritis Mother     Cancer Mother         endometrial       Social History     Tobacco Use    Smoking status: Never Smoker    Smokeless tobacco: Never Used   Substance Use Topics    Alcohol use: No      Current Outpatient Medications   Medication Sig Dispense Refill    FLUoxetine (PROZAC) 10 MG capsule Take 1 capsule by mouth daily In addition to the 40 mg capsule 30 capsule 3    FLUoxetine (PROZAC) 40 MG capsule Take 1 capsule by mouth daily 90 capsule 3    atorvastatin (LIPITOR) 20 MG tablet Take 1 tablet by mouth once daily 90 tablet 3    busPIRone (BUSPAR) 10 MG tablet Take 1 tablet by mouth daily for 10 days 10 tablet 0    glucose blood VI test strips (ASCENSIA AUTODISC VI;ONE TOUCH ULTRA TEST VI) strip 1 each by In Vitro route daily As needed. 100 each 3    Lancets MISC Check sugars daily 100 each 3     No current facility-administered medications for this visit.      No Known Allergies    Health Maintenance   Topic Date Due    HIV screen  Never done    Hepatitis B vaccine (1 of 3 - Risk 3-dose series) Never done    DTaP/Tdap/Td vaccine (1 - Tdap) Never done    Colon cancer screen colonoscopy  Never done    Flu vaccine (1) 09/01/2021    Breast cancer screen  08/20/2021    Cervical cancer screen  02/17/2023 (Originally 1/25/2020)    Diabetic foot exam  02/17/2022    Diabetic microalbuminuria test  02/18/2022    Lipid screen  02/18/2022    A1C test (Diabetic or Prediabetic)  06/01/2022    Diabetic retinal exam  07/12/2022    Pneumococcal 0-64 years Vaccine (2 of 2 - PPSV23) 12/31/2029    Shingles Vaccine  Completed    COVID-19 Vaccine  Completed    Hepatitis C screen  Completed    Hepatitis A vaccine  Aged Out    Hib vaccine  Aged Out    Meningococcal (ACWY) vaccine  Aged Out       Subjective:      Review of Systems    Objective:     /68 (Site: Left Upper Arm, Position: Sitting, Cuff Size: Large Adult)   Pulse 78   Wt 200 lb (90.7 kg)   LMP  (LMP Unknown) Comment: more than a couple years  SpO2 99%   BMI 33.28 kg/m²     Physical Exam  Vitals and nursing note reviewed. Constitutional:       Appearance: Normal appearance. She is well-developed. HENT:      Head: Normocephalic and atraumatic. Eyes:      Conjunctiva/sclera: Conjunctivae normal.   Neck:      Thyroid: No thyromegaly. Vascular: No JVD. Cardiovascular:      Rate and Rhythm: Normal rate and regular rhythm. Pulses: Normal pulses. Heart sounds: Normal heart sounds. No murmur heard. No friction rub. No gallop. Pulmonary:      Effort: Pulmonary effort is normal. No respiratory distress. Breath sounds: Normal breath sounds. Musculoskeletal:      Cervical back: Normal range of motion and neck supple. Right lower leg: No edema. Left lower leg: No edema. Lymphadenopathy:      Cervical: No cervical adenopathy. Skin:     General: Skin is warm. Neurological:      General: No focal deficit present. Mental Status: She is alert and oriented to person, place, and time. Psychiatric:         Behavior: Behavior normal.         Thought Content: Thought content normal.      Comments: Good eye contact         Assessment/Plan:     1. Depression with anxiety  2. Type 2 diabetes mellitus without complication, without long-term current use of insulin ([address]    Encouraged Mcintosh Sessions to keep up with her counseling. Would like her to stay in close contact with the office. She does promise to not hurt herself at this time. We will increase the fluoxetine to the 50 mg daily and hopefully this will help with the anxiety and the depressive symptoms. Emergency procedures reviewed.     Lab Results   Component Value Date    WBC 5.18 03/04/2018    HGB 13.3 03/04/2018    HCT 40.2 03/04/2018     03/04/2018    CHOL 177 02/18/2021    TRIG 87 02/18/2021    HDL 78 02/18/2021    ALT 26 02/18/2021    AST 24 02/18/2021     08/30/2021    K 4.1 08/30/2021     08/30/2021    CREATININE 0.7 08/30/2021    BUN 17 08/30/2021    CO2 30 08/30/2021    TSH 1.79 04/16/2018 LABA1C 5.7 06/01/2021    LABA1C 5.5 02/17/2021    LABA1C 5.6 07/07/2020    LABMICR CANNOT BE CALCULATED 02/18/2021       Return in about 4 weeks (around 9/23/2021). Patient given educational materials - see patientinstructions. Discussed use, benefit, and side effects of prescribed medications. All patient questions answered. Pt voiced understanding. Reviewed health maintenance. Instructed to continue current medications, diet andexercise. Patient agreed with treatment plan. Follow up as directed.      (Please note that portions of this note were completed with a voice-recognition program. Efforts were made to edit the dictation but occasionally words are mis-transcribed.)    Electronically signed by Reggie Quintero MD on 9/5/2021

## 2021-08-30 LAB
ANION GAP SERPL CALCULATED.3IONS-SCNC: 7.9 MMOL/L
BUN BLDV-MCNC: 17 MG/DL (ref 7–17)
CALCIUM SERPL-MCNC: 9.6 MG/DL (ref 8.4–10.2)
CHLORIDE BLD-SCNC: 102 MMOL/L (ref 98–120)
CO2: 30 MMOL/L (ref 22–31)
CREAT SERPL-MCNC: 0.7 MG/DL (ref 0.5–1)
GFR CALCULATED: > 60
GLUCOSE: 102 MG/DL (ref 65–105)
POTASSIUM SERPL-SCNC: 4.1 MMOL/L (ref 3.6–5)
SODIUM BLD-SCNC: 140 MMOL/L (ref 135–145)

## 2021-08-31 ENCOUNTER — TELEPHONE (OUTPATIENT)
Dept: FAMILY MEDICINE CLINIC | Age: 56
End: 2021-08-31

## 2021-09-01 RX ORDER — BUSPIRONE HYDROCHLORIDE 10 MG/1
10 TABLET ORAL 2 TIMES DAILY
Qty: 10 TABLET | Refills: 0 | Status: SHIPPED | OUTPATIENT
Start: 2021-09-01 | End: 2021-09-03 | Stop reason: SDUPTHER

## 2021-09-03 RX ORDER — BUSPIRONE HYDROCHLORIDE 10 MG/1
10 TABLET ORAL DAILY
Qty: 10 TABLET | Refills: 0 | Status: SHIPPED | OUTPATIENT
Start: 2021-09-03 | End: 2021-09-08

## 2021-09-07 ENCOUNTER — OFFICE VISIT (OUTPATIENT)
Dept: DIABETES SERVICES | Age: 56
End: 2021-09-07
Payer: COMMERCIAL

## 2021-09-07 VITALS
WEIGHT: 196.7 LBS | HEART RATE: 76 BPM | RESPIRATION RATE: 14 BRPM | BODY MASS INDEX: 32.77 KG/M2 | HEIGHT: 65 IN | SYSTOLIC BLOOD PRESSURE: 132 MMHG | DIASTOLIC BLOOD PRESSURE: 88 MMHG

## 2021-09-07 DIAGNOSIS — E78.2 MIXED HYPERLIPIDEMIA: ICD-10-CM

## 2021-09-07 DIAGNOSIS — I10 ESSENTIAL HYPERTENSION: ICD-10-CM

## 2021-09-07 DIAGNOSIS — E11.9 TYPE 2 DIABETES MELLITUS WITHOUT COMPLICATION, WITHOUT LONG-TERM CURRENT USE OF INSULIN (HCC): Primary | ICD-10-CM

## 2021-09-07 LAB — HBA1C MFR BLD: 5.6 %

## 2021-09-07 PROCEDURE — 83036 HEMOGLOBIN GLYCOSYLATED A1C: CPT | Performed by: NURSE PRACTITIONER

## 2021-09-07 PROCEDURE — 99214 OFFICE O/P EST MOD 30 MIN: CPT | Performed by: NURSE PRACTITIONER

## 2021-09-07 ASSESSMENT — ENCOUNTER SYMPTOMS
RESPIRATORY NEGATIVE: 1
SHORTNESS OF BREATH: 0
ABDOMINAL PAIN: 0
DIARRHEA: 0

## 2021-09-07 NOTE — PROGRESS NOTES
like to is not doing as well with this. Diet: low carb low calorie  Exercise: exercise classes  BS testing: rarely   Issues: denies       High cholesterol-  Takes lipitor and denies any adverse effects with its use.  Watches diet and exercise.      Hypertension-  Takes lisinopril and denies any adverse effects with their use. Watches diet and exercise. Denies any chest pain,or edema.  Follows with cardiology:No.     Obesity- Working on weight loss.  Her stated ideal weight is 135 lbs  She does have a history of bulimia. She does states she is mentally preoccupied with her weight loss.  She is seeing a counselor.  Weight has gone up    Past Medical History:   Diagnosis Date    History of bulimia     in 20 and 28's    Hyperlipidemia     Hypertension     Type 2 diabetes mellitus without complication, without long-term current use of insulin [facility] 7/29/2018     Family History   Problem Relation Age of Onset    Arthritis Mother     Cancer Mother         endometrial     Social History     Tobacco Use    Smoking status: Never Smoker    Smokeless tobacco: Never Used   Substance Use Topics    Alcohol use: No    Drug use: Not on file     No Known Allergies    MEDICATIONS:  Current Outpatient Medications   Medication Sig Dispense Refill    busPIRone (BUSPAR) 10 MG tablet Take 1 tablet by mouth daily for 10 days 10 tablet 0    FLUoxetine (PROZAC) 10 MG capsule Take 1 capsule by mouth daily In addition to the 40 mg capsule 30 capsule 3    FLUoxetine (PROZAC) 40 MG capsule Take 1 capsule by mouth daily 90 capsule 3    atorvastatin (LIPITOR) 20 MG tablet Take 1 tablet by mouth once daily 90 tablet 3    glucose blood VI test strips (ASCENSIA AUTODISC VI;ONE TOUCH ULTRA TEST VI) strip 1 each by In Vitro route daily As needed. 100 each 3    Lancets MISC Check sugars daily 100 each 3     No current facility-administered medications for this visit.        Review ofSymptoms:  Review of Systems   Constitutional: Positive for fatigue. Negative for unexpected weight change. Eyes: Negative for visual disturbance. Respiratory: Negative. Negative for shortness of breath. Cardiovascular: Negative for chest pain and leg swelling. Gastrointestinal: Negative for abdominal pain and diarrhea. Endocrine: Negative for polydipsia, polyphagia and polyuria. Genitourinary: Negative. Musculoskeletal: Negative. Skin: Negative for rash and wound. Neurological: Negative for dizziness, tremors, seizures and headaches. Psychiatric/Behavioral: Negative. Negative for confusion and decreased concentration. Theremainder of a complete 14-point review of systems is negative. Vital Signs: /88 (Site: Left Upper Arm, Position: Sitting, Cuff Size: Medium Adult)   Pulse 76   Resp 14   Ht 5' 5\" (1.651 m)   Wt 196 lb 11.2 oz (89.2 kg)   LMP  (LMP Unknown) Comment: more than a couple years  BMI 32.73 kg/m²      Wt Readings from Last 3 Encounters:   09/07/21 196 lb 11.2 oz (89.2 kg)   08/26/21 200 lb (90.7 kg)   06/01/21 201 lb 9.6 oz (91.4 kg)     Body mass index is 32.73 kg/m².   LABS:  Hemoglobin A1C   Date Value Ref Range Status   06/01/2021 5.7 % Final   02/17/2021 5.5 % Final     Lab Results   Component Value Date    LABMICR CANNOT BE CALCULATED 02/18/2021     Lab Results   Component Value Date     08/30/2021    K 4.1 08/30/2021     08/30/2021    CO2 30 08/30/2021    BUN 17 08/30/2021    CREATININE 0.7 08/30/2021    GLUCOSE 102 08/30/2021    CALCIUM 9.6 08/30/2021    PROT 7.2 02/18/2021    LABALBU 4.4 02/18/2021    BILITOT 0.75 02/18/2021    ALKPHOS 49 02/18/2021    AST 24 02/18/2021    ALT 26 02/18/2021    LABGLOM > 60.0 08/30/2021    GFRAA >60 02/18/2021    AGRATIO 1.3 03/04/2018    GLOB 2.9 03/04/2018     Lab Results   Component Value Date    CHOL 177 02/18/2021    CHOL 185 02/03/2020    CHOL 225 (H) 04/01/2019     Lab Results   Component Value Date    TRIG 87 02/18/2021    TRIG 68 02/03/2020    TRIG 104 04/01/2019     Lab Results   Component Value Date    HDL 78 02/18/2021    HDL 73 (H) 02/03/2020    HDL 53 12/01/2016     Lab Results   Component Value Date    LDLCHOLESTEROL 82 02/18/2021    LDLCALC 98.4 02/03/2020    LDLCALC 150.2 04/01/2019    LDLCALC 164.4 (H) 04/16/2018     Lab Results   Component Value Date    VLDL NOT REPORTED 02/18/2021    VLDL 14 02/03/2020    VLDL 21 04/01/2019     Lab Results   Component Value Date    CHOLHDLRATIO 2.3 02/18/2021    CHOLHDLRATIO 2.53 02/03/2020    CHOLHDLRATIO 4.2 04/01/2019           Physical Exam  Constitutional:       Appearance: She is well-developed. Eyes:      Pupils: Pupils are equal, round, and reactive to light. Neck:      Thyroid: No thyroid mass, thyromegaly or thyroid tenderness. Cardiovascular:      Rate and Rhythm: Normal rate and regular rhythm. Heart sounds: Normal heart sounds. Pulmonary:      Effort: Pulmonary effort is normal.      Breath sounds: Normal breath sounds. Abdominal:      General: Bowel sounds are normal.      Palpations: Abdomen is soft. Skin:     General: Skin is warm and dry. Comments: Negative for open/nonhealing wounds. Negative for lipohypertrophy. Neurological:      Mental Status: She is alert and oriented to person, place, and time. ASSESSMENT/PLAN:     Diagnosis Orders   1. Type 2 diabetes mellitus without complication, without long-term current use of insulin ([facility]     2. Mixed hyperlipidemia     3. Essential hypertension     4. BMI 32.0-32.9,adult       No orders of the defined types were placed in this encounter. No orders of the defined types were placed in this encounter. Requested Prescriptions      No prescriptions requested or ordered in this encounter       1. Type 2 diabetes mellitus without complication, without long-term current use of insulin ([facility])  - Stable  HbA1C goal is less than 7%.   - Fasting blood glucose goal is 70-130mg/dl and postprandial blood sugar goal is less than 180 mg/dl.   -Diabetic foot exam up-to-date: Yes  -Diabetic retinal exam up-to-date: Yes  - Labs reviewed includes: Most recent A1C 5.7%, Microalb/Crt. Ratio 0 mcg/mg creat and GFR >60 mL/min. Repeat labs due in 3 months.    -We discussed in great detail dietary modifications they can make to better improve their blood sugars. -follow up diabetes education completed, all questions answered. Patient Instructions   Plan out daily menu for a week with 1200 calories per day with about 20 grams of protein with each meal         Discussed signs and symptoms of hyper/hypoglycemia and how to treat. Encouraged 150 minutes of physical activity per week. Follow a low carbohydrate diet. Encouraged at least 7 hours of sleep. The patient was informed of the goals of diabetes management. This can only be accomplished by watching their diet and exercise levels. We certainly use medicines to help attain these goals. The consequences of not controlling blood sugars were discussed. These include blindness, heart disease, stroke, kidney disease, and possibly need for dialysis. They were told to be careful with their foot care as diabetics often have nerve damage, infections and risk for limb amutations . They also need a dilated eye exam yearly. We discussed the issues of diet, exercise, medication, complication avoidance, reviewed the signs and symptoms of diabetes, hypoglycemic episodes, significance of HbA1C.         2. Mixed hyperlipidemia  stable, lipid panel reviewed, continue current medications. Diet and exercise      3. Essential hypertension   stable, continue current medications. Diet and exercise Seek emergent care if chest pain develops. 4. BMI 32.0-32.9,adult  Reduce calories and increase physical activity to achieve a slow and steady weight loss to improve blood pressure, cholesterol and diabetes. Answered all patient questions. Agrees to follow plan of care and to follow up in 3 months, sooner if needed. Call office if unexplained blood sugars less than 70 occur or above 400. Call office or access MyChart with any further questions or concerns. Be sure to bring glucometer/food log at next appointment. Total time spent reviewing chart, labs, counseling patient and documenting on the date of the encounter: 30 min.       Electronically signed by IRENE Heredia CNP on 9/7/2021 at 10:39 AM      (Please note that portions of this note were completed with a voice-recognition program. Efforts were made to edit the dictation but occasionally words are mis-transcribed.)

## 2021-09-07 NOTE — PATIENT INSTRUCTIONS
Plan out daily menu for a week with 1200 calories per day with about 20 grams of protein with each meal

## 2021-09-23 ENCOUNTER — OFFICE VISIT (OUTPATIENT)
Dept: FAMILY MEDICINE CLINIC | Age: 56
End: 2021-09-23
Payer: COMMERCIAL

## 2021-09-23 VITALS
DIASTOLIC BLOOD PRESSURE: 72 MMHG | BODY MASS INDEX: 32.12 KG/M2 | OXYGEN SATURATION: 99 % | WEIGHT: 193 LBS | SYSTOLIC BLOOD PRESSURE: 116 MMHG | HEART RATE: 67 BPM

## 2021-09-23 DIAGNOSIS — F51.05 INSOMNIA DUE TO OTHER MENTAL DISORDER: ICD-10-CM

## 2021-09-23 DIAGNOSIS — F41.8 DEPRESSION WITH ANXIETY: Primary | ICD-10-CM

## 2021-09-23 DIAGNOSIS — Z23 NEED FOR INFLUENZA VACCINATION: ICD-10-CM

## 2021-09-23 DIAGNOSIS — F99 INSOMNIA DUE TO OTHER MENTAL DISORDER: ICD-10-CM

## 2021-09-23 PROCEDURE — 99214 OFFICE O/P EST MOD 30 MIN: CPT | Performed by: FAMILY MEDICINE

## 2021-09-23 PROCEDURE — 90674 CCIIV4 VAC NO PRSV 0.5 ML IM: CPT | Performed by: FAMILY MEDICINE

## 2021-09-23 PROCEDURE — 90471 IMMUNIZATION ADMIN: CPT | Performed by: FAMILY MEDICINE

## 2021-09-23 RX ORDER — TRAZODONE HYDROCHLORIDE 50 MG/1
50 TABLET ORAL NIGHTLY
Qty: 30 TABLET | Refills: 1 | Status: SHIPPED | OUTPATIENT
Start: 2021-09-23 | End: 2021-11-04 | Stop reason: ALTCHOICE

## 2021-09-23 NOTE — PROGRESS NOTES
[address] [facility]  [address]  Dept: [phone]  Dept QVB:[phone]    Ashlie Diego is a 63 y.o. female who presents today for her medical conditions/complaints as notedbelow. Ashlie Diego is c/o of Depression (4 week follow up- states meds are working \"so-so\")      HPI:     HPI    Thinks the buspirone is helping. Is still only getting about. Still wakes up about 5 hours after going to bed and then cannot go back to sleep. Does not have as much anxiety in the day -- it is getting better. Is distracted a lot at work, hard to stay focused. More good days than bad. Thinks if she could sleep she would feel a lot better. Is still seeing the counselor weekly-- did talk to her  about this counseling and he was \"ok with it\". Does still have some thoughts of hurting herself, alittle better but still there.       BP Readings from Last 3 Encounters:   09/23/21 116/72   09/07/21 132/88   08/26/21 114/68          (goal 120/80)    Wt Readings from Last 3 Encounters:   09/23/21 193 lb (87.5 kg)   09/07/21 196 lb 11.2 oz (89.2 kg)   08/26/21 200 lb (90.7 kg)        Past Medical History:   Diagnosis Date    History of bulimia     in 19 and 30's    Hyperlipidemia     Hypertension     Type 2 diabetes mellitus without complication, without long-term current use of insulin ([facility] 7/29/2018      Past Surgical History:   Procedure Laterality Date    ANKLE SURGERY      scope    CERVICAL POLYP REMOVAL  01/25/2017    CHOLECYSTECTOMY      DILATION AND CURETTAGE OF UTERUS  01/2017    due to menorrhagia    TUBAL LIGATION         Family History   Problem Relation Age of Onset    Arthritis Mother     Cancer Mother         endometrial       Social History     Tobacco Use    Smoking status: Never Smoker    Smokeless tobacco: Never Used   Substance Use Topics    Alcohol use: No      Current Outpatient Medications   Medication Sig Dispense Refill  traZODone (DESYREL) 50 MG tablet Take 1 tablet by mouth nightly 30 tablet 1    busPIRone (BUSPAR) 10 MG tablet Take 1 tablet by mouth daily 30 tablet 1    FLUoxetine (PROZAC) 10 MG capsule Take 1 capsule by mouth daily In addition to the 40 mg capsule 30 capsule 3    FLUoxetine (PROZAC) 40 MG capsule Take 1 capsule by mouth daily 90 capsule 3    atorvastatin (LIPITOR) 20 MG tablet Take 1 tablet by mouth once daily 90 tablet 3    glucose blood VI test strips (ASCENSIA AUTODISC VI;ONE TOUCH ULTRA TEST VI) strip 1 each by In Vitro route daily As needed. 100 each 3    Lancets MISC Check sugars daily 100 each 3     No current facility-administered medications for this visit. No Known Allergies    Health Maintenance   Topic Date Due    HIV screen  Never done    Hepatitis B vaccine (1 of 3 - Risk 3-dose series) Never done    DTaP/Tdap/Td vaccine (1 - Tdap) Never done    Colon cancer screen colonoscopy  Never done    Cervical cancer screen  01/25/2020    Breast cancer screen  08/20/2021    Diabetic foot exam  02/17/2022    Diabetic microalbuminuria test  02/18/2022    Lipid screen  02/18/2022    Diabetic retinal exam  07/12/2022    A1C test (Diabetic or Prediabetic)  09/07/2022    Pneumococcal 0-64 years Vaccine (2 of 2 - PPSV23) 12/31/2029    Flu vaccine  Completed    Shingles Vaccine  Completed    COVID-19 Vaccine  Completed    Hepatitis C screen  Completed    Hepatitis A vaccine  Aged Out    Hib vaccine  Aged Out    Meningococcal (ACWY) vaccine  Aged Out       Subjective:      Review of Systems    Objective:     /72 (Site: Right Upper Arm, Position: Sitting, Cuff Size: Large Adult)   Pulse 67   Wt 193 lb (87.5 kg)   LMP  (LMP Unknown) Comment: more than a couple years  SpO2 99%   BMI 32.12 kg/m²     Physical Exam  Vitals and nursing note reviewed. Constitutional:       Appearance: Normal appearance. She is well-developed. HENT:      Head: Normocephalic and atraumatic. Eyes:      Conjunctiva/sclera: Conjunctivae normal.   Neck:      Thyroid: No thyromegaly. Vascular: No JVD. Cardiovascular:      Rate and Rhythm: Normal rate and regular rhythm. Heart sounds: Normal heart sounds. No murmur heard. No friction rub. No gallop. Pulmonary:      Effort: Pulmonary effort is normal. No respiratory distress. Breath sounds: Normal breath sounds. Abdominal:      Palpations: Abdomen is soft. Musculoskeletal:      Cervical back: Normal range of motion and neck supple. Right lower leg: No edema. Left lower leg: No edema. Lymphadenopathy:      Cervical: No cervical adenopathy. Skin:     General: Skin is warm. Neurological:      General: No focal deficit present. Mental Status: She is alert and oriented to person, place, and time. Psychiatric:         Mood and Affect: Mood normal.         Behavior: Behavior normal.         Thought Content: Thought content normal.         Judgment: Judgment normal.         Assessment/Plan:     1. Depression with anxiety  2. Need for influenza vaccination  -     INFLUENZA, MDCK QUADV, 2 YRS AND OLDER, IM, PF, PREFILL SYR OR SDV, 0.5ML (FLUCELVAX QUADV, PF)  3. Insomnia due to other mental disorder  -     traZODone (DESYREL) 50 MG tablet; Take 1 tablet by mouth nightly, Disp-30 tablet, R-1Normal    Continue with the 50 mg of the fluoxetine at this time. Would consider a change in the medication if not improving. Perhaps to a SNRI or to lexapro. Add in the trazodone at this time. Can also consider increasing the buspirone if needed. Reviewed emergency procedures.      Lab Results   Component Value Date    WBC 5.18 03/04/2018    HGB 13.3 03/04/2018    HCT 40.2 03/04/2018     03/04/2018    CHOL 177 02/18/2021    TRIG 87 02/18/2021    HDL 78 02/18/2021    ALT 26 02/18/2021    AST 24 02/18/2021     08/30/2021    K 4.1 08/30/2021     08/30/2021    CREATININE 0.7 08/30/2021    BUN 17 08/30/2021 CO2 30 08/30/2021    TSH 1.79 04/16/2018    LABA1C 5.6 09/07/2021    LABA1C 5.7 06/01/2021    LABA1C 5.5 02/17/2021    LABMICR CANNOT BE CALCULATED 02/18/2021       Return in about 4 weeks (around 10/21/2021). Patient given educational materials - see patientinstructions. Discussed use, benefit, and side effects of prescribed medications. All patient questions answered. Pt voiced understanding. Reviewed health maintenance. Instructed to continue current medications, diet andexercise. Patient agreed with treatment plan. Follow up as directed.      (Please note that portions of this note were completed with a voice-recognition program. Efforts were made to edit the dictation but occasionally words are mis-transcribed.)    Electronically signed by Emmett John MD on 9/26/2021

## 2021-09-23 NOTE — PROGRESS NOTES
Have you had an allergic reaction to the flu (influenza) shot? no  Are you allergic to eggs or any component of the flu vaccine? no  Do you have a history of Guillain-Youngwood Syndrome (GBS), which is paralysis after receiving the flu vaccine? no  Are you feeling well today? yes  Flu vaccine given as ordered. Patient tolerated it well. No questions re: VIS information.

## 2021-09-29 ENCOUNTER — PATIENT MESSAGE (OUTPATIENT)
Dept: FAMILY MEDICINE CLINIC | Age: 56
End: 2021-09-29

## 2021-09-29 DIAGNOSIS — F33.2 SEVERE EPISODE OF RECURRENT MAJOR DEPRESSIVE DISORDER, WITHOUT PSYCHOTIC FEATURES (HCC): Primary | ICD-10-CM

## 2021-09-29 RX ORDER — BUPROPION HYDROCHLORIDE 150 MG/1
150 TABLET ORAL EVERY MORNING
Qty: 30 TABLET | Refills: 0 | Status: SHIPPED | OUTPATIENT
Start: 2021-09-29 | End: 2021-10-25

## 2021-09-29 NOTE — TELEPHONE ENCOUNTER
From: Madison Blair  To: Annalise Tucker MD  Sent: 9/29/2021 9:02 AM EDT  Subject: Non-Urgent Medical Question    I'm not sure if the medicine is working. I can't stop crying. I'm really feeling depressed.

## 2021-10-21 ENCOUNTER — OFFICE VISIT (OUTPATIENT)
Dept: FAMILY MEDICINE CLINIC | Age: 56
End: 2021-10-21
Payer: COMMERCIAL

## 2021-10-21 VITALS
DIASTOLIC BLOOD PRESSURE: 74 MMHG | BODY MASS INDEX: 30.95 KG/M2 | HEART RATE: 81 BPM | OXYGEN SATURATION: 99 % | WEIGHT: 186 LBS | SYSTOLIC BLOOD PRESSURE: 134 MMHG

## 2021-10-21 DIAGNOSIS — F33.2 SEVERE EPISODE OF RECURRENT MAJOR DEPRESSIVE DISORDER, WITHOUT PSYCHOTIC FEATURES (HCC): Primary | ICD-10-CM

## 2021-10-21 PROCEDURE — 99214 OFFICE O/P EST MOD 30 MIN: CPT | Performed by: FAMILY MEDICINE

## 2021-10-21 RX ORDER — BUSPIRONE HYDROCHLORIDE 10 MG/1
20 TABLET ORAL DAILY
Qty: 60 TABLET | Refills: 1 | Status: SHIPPED | OUTPATIENT
Start: 2021-10-21 | End: 2021-11-17

## 2021-10-21 NOTE — PROGRESS NOTES
[address] [facility]  [address]  Dept: [phone]  Dept Critical access hospital:[phone]    Chester Hernandez is a 63 y.o. female who presents today for her medical conditions/complaints as notedbelow. Chester Hernandez is c/o of Depression (follow up)      HPI:     HPI    At Dominique's last in person visit we added in the trazodone to help her with sleep. She continues on the buspirone 10 mg daily as well as the fluoxetine 50 mg daily. We also added on 150 mg of the Wellbutrin to help with the fatigue and the depressive symptoms. Denies suicidal thoughts at this time    Still not sleeping well. Still will get only the 5 hours of sleep. Will be able to fall asleep then is up and going in the middle of the night. Is tired but cannot sleep. Has trouble concentrating, making mistakes at work. Does think the wellbutrin helped with the depression. Has had increasing self harm behaviors -- this is new-- has not been vomiting. Cannot purge any more. She has not been hurting herself but will take her nails into her arms to the point that she gets red marks scratches on her arms.     Is hungry at work, is trying to loss weight so is not eating any breakfast.  Still struggles with her self-image    BP Readings from Last 3 Encounters:   10/21/21 134/74   09/23/21 116/72   09/07/21 132/88          (goal 120/80)    Wt Readings from Last 3 Encounters:   10/21/21 186 lb (84.4 kg)   09/23/21 193 lb (87.5 kg)   09/07/21 196 lb 11.2 oz (89.2 kg)        Past Medical History:   Diagnosis Date    History of bulimia     in 20 and 30's    Hyperlipidemia     Hypertension     Type 2 diabetes mellitus without complication, without long-term current use of insulin [facility] 7/29/2018      Past Surgical History:   Procedure Laterality Date    ANKLE SURGERY      scope    CERVICAL POLYP REMOVAL  01/25/2017    CHOLECYSTECTOMY      DILATION AND CURETTAGE OF UTERUS  01/2017    due to menorrhagia  TUBAL LIGATION         Family History   Problem Relation Age of Onset    Arthritis Mother     Cancer Mother         endometrial       Social History     Tobacco Use    Smoking status: Never Smoker    Smokeless tobacco: Never Used   Substance Use Topics    Alcohol use: No      Current Outpatient Medications   Medication Sig Dispense Refill    busPIRone (BUSPAR) 10 MG tablet Take 2 tablets by mouth daily 60 tablet 1    atorvastatin (LIPITOR) 20 MG tablet Take 1 tablet by mouth once daily 90 tablet 5    buPROPion (WELLBUTRIN XL) 150 MG extended release tablet Take 1 tablet by mouth every morning 30 tablet 0    traZODone (DESYREL) 50 MG tablet Take 1 tablet by mouth nightly 30 tablet 1    FLUoxetine (PROZAC) 10 MG capsule Take 1 capsule by mouth daily In addition to the 40 mg capsule 30 capsule 3    FLUoxetine (PROZAC) 40 MG capsule Take 1 capsule by mouth daily 90 capsule 3    glucose blood VI test strips (ASCENSIA AUTODISC VI;ONE TOUCH ULTRA TEST VI) strip 1 each by In Vitro route daily As needed. 100 each 3    Lancets MISC Check sugars daily 100 each 3     No current facility-administered medications for this visit.      No Known Allergies    Health Maintenance   Topic Date Due    HIV screen  Never done    Hepatitis B vaccine (1 of 3 - Risk 3-dose series) Never done    DTaP/Tdap/Td vaccine (1 - Tdap) Never done    Colon cancer screen colonoscopy  Never done    Cervical cancer screen  01/25/2020    Breast cancer screen  08/20/2021    Diabetic foot exam  02/17/2022    Diabetic microalbuminuria test  02/18/2022    Lipid screen  02/18/2022    Diabetic retinal exam  07/12/2022    A1C test (Diabetic or Prediabetic)  09/07/2022    Pneumococcal 0-64 years Vaccine (2 of 2 - PPSV23) 12/31/2029    Flu vaccine  Completed    Shingles Vaccine  Completed    COVID-19 Vaccine  Completed    Hepatitis C screen  Completed    Hepatitis A vaccine  Aged Out    Hib vaccine  Aged Out    Meningococcal (ACWY) help us rule out bipolar disorder. Lab Results   Component Value Date    WBC 5.18 03/04/2018    HGB 13.3 03/04/2018    HCT 40.2 03/04/2018     03/04/2018    CHOL 177 02/18/2021    TRIG 87 02/18/2021    HDL 78 02/18/2021    ALT 26 02/18/2021    AST 24 02/18/2021     08/30/2021    K 4.1 08/30/2021     08/30/2021    CREATININE 0.7 08/30/2021    BUN 17 08/30/2021    CO2 30 08/30/2021    TSH 1.79 04/16/2018    LABA1C 5.6 09/07/2021    LABA1C 5.7 06/01/2021    LABA1C 5.5 02/17/2021    LABMICR CANNOT BE CALCULATED 02/18/2021       Return in about 6 weeks (around 12/2/2021). Patient given educational materials - see patientinstructions. Discussed use, benefit, and side effects of prescribed medications. All patient questions answered. Pt voiced understanding. Reviewed health maintenance. Instructed to continue current medications, diet andexercise. Patient agreed with treatment plan. Follow up as directed.      (Please note that portions of this note were completed with a voice-recognition program. Efforts were made to edit the dictation but occasionally words are mis-transcribed.)    Electronically signed by Omi Macias MD on 10/24/2021

## 2021-11-03 ENCOUNTER — TELEPHONE (OUTPATIENT)
Dept: FAMILY MEDICINE CLINIC | Age: 56
End: 2021-11-03

## 2021-11-03 NOTE — TELEPHONE ENCOUNTER
Patient called in to ask about a tetanus vaccine. She stated \"I'm not going to say I cut myself but I cut myself. \"  When asked if she did it on purpose she stated \"it relieves the pain a little. \"  She said she used a dariela razor blade. No tetanus documented in chart. Advised she is due for a tetanus. When asked if she has any intentions of hurting herself she said \"no, I'm not getting locked away. \"  She states nothing recently changed or happened to make her feel this way. She said cutting helps with the pain. She declined an appointment but after talking to her a little more she agreed to come in tomorrow to see Dr Laine Muñoz (only if she does not have to get on a scale) and get an updated tetanus. She states the cut is not very deep. Denies need for any medical intervention to stop the bleeding. Again denied any intention of hurting herself further. Advised to call office back if she feels worse. Dr Laine Muñoz notified of above conversation. Patient does have a list of crisis emergency numbers.

## 2021-11-04 ENCOUNTER — OFFICE VISIT (OUTPATIENT)
Dept: FAMILY MEDICINE CLINIC | Age: 56
End: 2021-11-04
Payer: COMMERCIAL

## 2021-11-04 VITALS — DIASTOLIC BLOOD PRESSURE: 70 MMHG | OXYGEN SATURATION: 98 % | HEART RATE: 67 BPM | SYSTOLIC BLOOD PRESSURE: 116 MMHG

## 2021-11-04 DIAGNOSIS — F33.9 RECURRENT MAJOR DEPRESSION RESISTANT TO TREATMENT (HCC): ICD-10-CM

## 2021-11-04 DIAGNOSIS — L03.113 CELLULITIS OF RIGHT UPPER EXTREMITY: Primary | ICD-10-CM

## 2021-11-04 DIAGNOSIS — S41.111A LACERATION OF RIGHT UPPER EXTREMITY, INITIAL ENCOUNTER: ICD-10-CM

## 2021-11-04 PROCEDURE — 90471 IMMUNIZATION ADMIN: CPT | Performed by: FAMILY MEDICINE

## 2021-11-04 PROCEDURE — 99214 OFFICE O/P EST MOD 30 MIN: CPT | Performed by: FAMILY MEDICINE

## 2021-11-04 PROCEDURE — 90715 TDAP VACCINE 7 YRS/> IM: CPT | Performed by: FAMILY MEDICINE

## 2021-11-04 RX ORDER — ARIPIPRAZOLE 2 MG/1
2 TABLET ORAL DAILY
Qty: 30 TABLET | Refills: 3 | Status: SHIPPED | OUTPATIENT
Start: 2021-11-04 | End: 2021-12-22 | Stop reason: SDUPTHER

## 2021-11-04 RX ORDER — CEPHALEXIN 500 MG/1
1000 CAPSULE ORAL 2 TIMES DAILY
Qty: 28 CAPSULE | Refills: 0 | Status: SHIPPED | OUTPATIENT
Start: 2021-11-04 | End: 2021-11-11

## 2021-11-04 NOTE — PROGRESS NOTES
Never Used   Substance Use Topics    Alcohol use: No      Current Outpatient Medications   Medication Sig Dispense Refill    cephALEXin (KEFLEX) 500 MG capsule Take 2 capsules by mouth 2 times daily for 7 days 28 capsule 0    ARIPiprazole (ABILIFY) 2 MG tablet Take 1 tablet by mouth daily 30 tablet 3    buPROPion (WELLBUTRIN XL) 150 MG extended release tablet TAKE 1 TABLET BY MOUTH ONCE DAILY IN THE MORNING 30 tablet 2    busPIRone (BUSPAR) 10 MG tablet Take 2 tablets by mouth daily 60 tablet 1    atorvastatin (LIPITOR) 20 MG tablet Take 1 tablet by mouth once daily 90 tablet 5    FLUoxetine (PROZAC) 10 MG capsule Take 1 capsule by mouth daily In addition to the 40 mg capsule 30 capsule 3    FLUoxetine (PROZAC) 40 MG capsule Take 1 capsule by mouth daily 90 capsule 3    glucose blood VI test strips (ASCENSIA AUTODISC VI;ONE TOUCH ULTRA TEST VI) strip 1 each by In Vitro route daily As needed. 100 each 3    Lancets MISC Check sugars daily 100 each 3     No current facility-administered medications for this visit.      No Known Allergies    Health Maintenance   Topic Date Due    HIV screen  Never done    Hepatitis B vaccine (1 of 3 - Risk 3-dose series) Never done    Colon cancer screen colonoscopy  Never done    Cervical cancer screen  01/25/2020    Breast cancer screen  08/20/2021    Diabetic foot exam  02/17/2022    Diabetic microalbuminuria test  02/18/2022    Lipid screen  02/18/2022    Diabetic retinal exam  07/12/2022    A1C test (Diabetic or Prediabetic)  09/07/2022    Pneumococcal 0-64 years Vaccine (2 of 2 - PPSV23) 12/31/2029    DTaP/Tdap/Td vaccine (2 - Td or Tdap) 11/04/2031    Flu vaccine  Completed    Shingles Vaccine  Completed    COVID-19 Vaccine  Completed    Hepatitis C screen  Completed    Hepatitis A vaccine  Aged Out    Hib vaccine  Aged Out    Meningococcal (ACWY) vaccine  Aged Out       Subjective:      Review of Systems    Objective:     /70 (Site: Left Upper Arm, Position: Sitting, Cuff Size: Large Adult)   Pulse 67   LMP  (LMP Unknown) Comment: more than a couple years  SpO2 98%     Physical Exam  Vitals and nursing note reviewed. Constitutional:       Appearance: Normal appearance. Musculoskeletal:        Arms:       Cervical back: Neck supple. Neurological:      Mental Status: She is alert. Assessment/Plan:     1. Cellulitis of right upper extremity  -     cephALEXin (KEFLEX) 500 MG capsule; Take 2 capsules by mouth 2 times daily for 7 days, Disp-28 capsule, R-0Normal  2. Recurrent major depression resistant to treatment ([facility] [address]  -     ARIPiprazole (ABILIFY) 2 MG tablet; Take 1 tablet by mouth daily, Disp-30 tablet, R-3Normal  3. Laceration of right upper extremity, initial encounter  -     Tdap (age 6y and older) IM ([address])     Start with the 1/2 tablet of the abilify (aripiprazole) at bedtime to help with the sleep and if not improving then go to the full tablet. Stop the trazodone at night. Did discuss with her side effects including extrapyramidal side effects today. Do think that she may need some significant medication adjustments but has I am concerned about her current stability do not want to make rapid medication adjustments until she sees psychiatry which is again scheduled for January. She denies any plans to kill herself but has had continued episodic thoughts of suicide. She does have the emergency number and agrees to call if any more serious suicidal thoughts occur. Denies being actively suicidal at this time. Encouraged use of other controlled mechanisms such as using the rubber bands and stabbing them, having friends or family she can call to talk to to distract when she has feelings of self-harm. Start the cephalexin for the arm infection and the tetanus shot today.     Lab Results   Component Value Date    WBC 5.18 03/04/2018    HGB 13.3 03/04/2018    HCT 40.2 03/04/2018     03/04/2018    CHOL 177 02/18/2021 TRIG 87 02/18/2021    HDL 78 02/18/2021    ALT 26 02/18/2021    AST 24 02/18/2021     08/30/2021    K 4.1 08/30/2021     08/30/2021    CREATININE 0.7 08/30/2021    BUN 17 08/30/2021    CO2 30 08/30/2021    TSH 1.79 04/16/2018    LABA1C 5.6 09/07/2021    LABA1C 5.7 06/01/2021    LABA1C 5.5 02/17/2021    LABMICR CANNOT BE CALCULATED 02/18/2021       Return in about 4 weeks (around 12/2/2021) for Medication recheck. Patient given educational materials - see patientinstructions. Discussed use, benefit, and side effects of prescribed medications. All patient questions answered. Pt voiced understanding. Reviewed health maintenance. Instructed to continue current medications, diet andexercise. Patient agreed with treatment plan. Follow up as directed.      (Please note that portions of this note were completed with a voice-recognition program. Efforts were made to edit the dictation but occasionally words are mis-transcribed.)    Electronically signed by Faustina Leyva MD on 11/4/2021

## 2021-11-04 NOTE — PATIENT INSTRUCTIONS
Start witht he 1/2 tablet of the abilify (aripiprazole) at bedtime to help with the sleep and if not improving then go to the full tablet. Stop the trazodone at night. Start the cephalexin for the arm infection and the tetanus shot today.

## 2021-12-03 ENCOUNTER — OFFICE VISIT (OUTPATIENT)
Dept: FAMILY MEDICINE CLINIC | Age: 56
End: 2021-12-03
Payer: COMMERCIAL

## 2021-12-03 VITALS
WEIGHT: 177 LBS | OXYGEN SATURATION: 98 % | SYSTOLIC BLOOD PRESSURE: 110 MMHG | BODY MASS INDEX: 29.49 KG/M2 | HEIGHT: 65 IN | DIASTOLIC BLOOD PRESSURE: 82 MMHG | RESPIRATION RATE: 20 BRPM | HEART RATE: 95 BPM

## 2021-12-03 DIAGNOSIS — F33.9 RECURRENT MAJOR DEPRESSION RESISTANT TO TREATMENT (HCC): ICD-10-CM

## 2021-12-03 DIAGNOSIS — E78.2 MIXED HYPERLIPIDEMIA: Primary | ICD-10-CM

## 2021-12-03 DIAGNOSIS — I10 ESSENTIAL HYPERTENSION: ICD-10-CM

## 2021-12-03 DIAGNOSIS — E11.9 TYPE 2 DIABETES MELLITUS WITHOUT COMPLICATION, WITHOUT LONG-TERM CURRENT USE OF INSULIN (HCC): ICD-10-CM

## 2021-12-03 DIAGNOSIS — R00.2 PALPITATIONS: ICD-10-CM

## 2021-12-03 DIAGNOSIS — F41.8 DEPRESSION WITH ANXIETY: ICD-10-CM

## 2021-12-03 LAB — TSH SERPL DL<=0.05 MIU/L-ACNC: 0.9 MIU/ML (ref 0.49–4.67)

## 2021-12-03 PROCEDURE — 99214 OFFICE O/P EST MOD 30 MIN: CPT | Performed by: FAMILY MEDICINE

## 2021-12-03 PROCEDURE — 93000 ELECTROCARDIOGRAM COMPLETE: CPT | Performed by: FAMILY MEDICINE

## 2021-12-03 RX ORDER — FLUOXETINE HYDROCHLORIDE 20 MG/1
20 CAPSULE ORAL DAILY
Qty: 30 CAPSULE | Refills: 1 | Status: SHIPPED | OUTPATIENT
Start: 2021-12-03 | End: 2022-01-19 | Stop reason: ALTCHOICE

## 2021-12-03 NOTE — PROGRESS NOTES
[address] [facility]  [address]  Dept: [phone]  Dept QFF:[phone]    Jonathan Stinson is a 63 y.o. female who presents today for her medical conditions/complaints as notedbelow. Jonathan Stinson is c/o of Discuss Medications (6 wk f/u )      HPI:     HPI  Hemoglobin A1c in September was 5.6. Lipids and CMP were at goal in February 2021. Still doing her drumming and step classes regularly.,  No appetite at all. Has continued to lose weight. From not eating much at all. When she does eat is relatively healthy lower calorie-containing food    At her last visit we did add Abilify. Abiola Lopez had been experiencing some cutting type behaviors. Had an infected cut on her right arm. Also referred for counseling and psychiatrist.  Malka Hooperw the trazodone. Mood is still up and down. Feels both the anxiety and the depression symptoms. Will feel down a few times per week and continues with the counseling a few times per week. Really has lost her appetite. Sleep has been terrible. It is actually worse than it was previously, only 3-5 hours per night. Is so tired. Has had some Heart racing several times per week with heart racing several times per week in the last few weeks. Usually associated with anxiety. No patterns of what she is doing- very random. No SOB at those times or other times. No leg swelling at all. Has been nauseated lately-- It will turn her off of eating. No abd pain. Has not had any purging or vomiting.     BP Readings from Last 3 Encounters:   12/07/21 112/82   12/03/21 110/82   11/04/21 116/70          (goal 120/80)    Wt Readings from Last 3 Encounters:   12/07/21 178 lb 4.8 oz (80.9 kg)   12/03/21 177 lb (80.3 kg)   10/21/21 186 lb (84.4 kg)        Past Medical History:   Diagnosis Date    History of bulimia     in 20 and 30's    Hyperlipidemia     Hypertension     Type 2 diabetes mellitus without complication, without long-term current use of insulin ([facility] [address] 7/29/2018      Past Surgical History:   Procedure Laterality Date    ANKLE SURGERY      scope    CERVICAL POLYP REMOVAL  01/25/2017    CHOLECYSTECTOMY      DILATION AND CURETTAGE OF UTERUS  01/2017    due to menorrhagia    TUBAL LIGATION         Family History   Problem Relation Age of Onset    Arthritis Mother     Cancer Mother         endometrial       Social History     Tobacco Use    Smoking status: Never Smoker    Smokeless tobacco: Never Used   Substance Use Topics    Alcohol use: No      Current Outpatient Medications   Medication Sig Dispense Refill    FLUoxetine (PROZAC) 20 MG capsule Take 1 capsule by mouth daily In addition to the 40 mg dose 30 capsule 1    busPIRone (BUSPAR) 10 MG tablet Take 1 tablet by mouth once daily 30 tablet 5    ARIPiprazole (ABILIFY) 2 MG tablet Take 1 tablet by mouth daily 30 tablet 3    atorvastatin (LIPITOR) 20 MG tablet Take 1 tablet by mouth once daily 90 tablet 5    FLUoxetine (PROZAC) 40 MG capsule Take 1 capsule by mouth daily 90 capsule 3    glucose blood VI test strips (ASCENSIA AUTODISC VI;ONE TOUCH ULTRA TEST VI) strip 1 each by In Vitro route daily As needed. 100 each 3    Lancets MISC Check sugars daily 100 each 3     No current facility-administered medications for this visit.      No Known Allergies    Health Maintenance   Topic Date Due    HIV screen  Never done    Hepatitis B vaccine (1 of 3 - Risk 3-dose series) Never done    Colon cancer screen colonoscopy  Never done    Breast cancer screen  08/20/2021    COVID-19 Vaccine (3 - Booster for Jonna Rolf series) 10/13/2021    Cervical cancer screen  01/25/2023 (Originally 1/25/2020)    Diabetic foot exam  02/17/2022    Diabetic microalbuminuria test  02/18/2022    Lipid screen  02/18/2022    Diabetic retinal exam  07/12/2022    A1C test (Diabetic or Prediabetic)  12/07/2022    Pneumococcal 0-64 years Vaccine (2 of 2 - PPSV23) 12/31/2029    DTaP/Tdap/Td vaccine (2 - Td or Tdap) 11/04/2031    Flu vaccine  Completed    Shingles Vaccine  Completed    Hepatitis C screen  Completed    Hepatitis A vaccine  Aged Out    Hib vaccine  Aged Out    Meningococcal (ACWY) vaccine  Aged Out       Subjective:      Review of Systems    Objective:     /82 (Site: Left Upper Arm, Position: Sitting, Cuff Size: Large Adult)   Pulse 95   Resp 20   Ht 5' 5\" (1.651 m)   Wt 177 lb (80.3 kg)   LMP  (LMP Unknown) Comment: more than a couple years  SpO2 98%   BMI 29.45 kg/m²     Physical Exam  Vitals and nursing note reviewed. Constitutional:       Appearance: Normal appearance. She is well-developed. HENT:      Head: Normocephalic and atraumatic. Eyes:      Conjunctiva/sclera: Conjunctivae normal.   Neck:      Thyroid: No thyromegaly. Vascular: No JVD. Cardiovascular:      Rate and Rhythm: Normal rate and regular rhythm. Heart sounds: Normal heart sounds. No murmur heard. No friction rub. No gallop. Pulmonary:      Effort: Pulmonary effort is normal. No respiratory distress. Breath sounds: Normal breath sounds. Abdominal:      Palpations: Abdomen is soft. There is no mass. Tenderness: There is no abdominal tenderness. Musculoskeletal:      Cervical back: Normal range of motion and neck supple. Right lower leg: No edema. Left lower leg: No edema. Lymphadenopathy:      Cervical: No cervical adenopathy. Skin:     General: Skin is warm. Neurological:      General: No focal deficit present. Mental Status: She is alert and oriented to person, place, and time. Psychiatric:         Mood and Affect: Mood normal.         Behavior: Behavior normal.         Thought Content: Thought content normal.         Judgment: Judgment normal.         Assessment/Plan:     1. Mixed hyperlipidemia  -     Lipid Panel; Future  2. Essential hypertension  -     Comprehensive Metabolic Panel; Future  3.  Recurrent major depression resistant to treatment ([facility] [address]  -     FLUoxetine (PROZAC) 20 MG capsule; Take 1 capsule by mouth daily In addition to the 40 mg dose, Disp-30 capsule, R-1Normal  4. Depression with anxiety  5. Type 2 diabetes mellitus without complication, without long-term current use of insulin ([facility])  -     Microalbumin, Ur; Future  6. Palpitations  -     EKG 12 Lead     Can safely also try Magnesium over the counter at 250- 500 mg once or twice daily to help with the anxiety. Try to find the magnesium gluconate, magnesium citrate, or magnesium glycinate rather than the magnesium oxide as it is better absorbed and less likely to cause any loose bowel movements. Stop the Buproprion-  Hopefully this will help the sleep and the nausea. Increase the fluoxetine to 60 mg by taking the 20 mg capsules and the 40 mg capsules. Continue with the low-dose of Abilify in addition. Has appointment scheduled with psychiatry and will see what they recommend as far as the further medication changes to help her. Continue with her regular counseling. Lab Results   Component Value Date    WBC 5.18 03/04/2018    HGB 13.3 03/04/2018    HCT 40.2 03/04/2018     03/04/2018    CHOL 177 02/18/2021    TRIG 87 02/18/2021    HDL 78 02/18/2021    ALT 26 02/18/2021    AST 24 02/18/2021     08/30/2021    K 4.1 08/30/2021     08/30/2021    CREATININE 0.7 08/30/2021    BUN 17 08/30/2021    CO2 30 08/30/2021    TSH 0.90 12/03/2021    LABA1C 5.7 12/07/2021    LABA1C 5.6 09/07/2021    LABA1C 5.7 06/01/2021    LABMICR CANNOT BE CALCULATED 02/18/2021       Return in about 3 months (around 3/3/2022). Multiple labs and other testing may have been ordered which may not be completely evident from the above note due to system interface incompatibilities. Patient given educational materials - see patientinstructions. Discussed use, benefit, and side effects of prescribed medications. All patient questions answered.   Pt voiced understanding. Reviewed health maintenance. Instructed to continue current medications, diet andexercise. Patient agreed with treatment plan. Follow up as directed.      (Please note that portions of this note were completed with a voice-recognition program. Efforts were made to edit the dictation but occasionally words are mis-transcribed.)    Electronically signed by Derek Marr MD on 12/12/2021

## 2021-12-03 NOTE — PATIENT INSTRUCTIONS
Can safely also try Magnesium over the counter at 250- 500 mg once or twice daily to help with the anxiety. Try to find the magnesium gluconate, magnesium citrate, or magnesium glycinate rather than the magnesium oxide as it is better absorbed and less likely to cause any loose bowel movements. Stop the Buproprion-  Hopefully this will help the sleep and the nausea. Increase the fluoxetine to 60 mg by taking the 20 mg capsules and the 40 mg capsules.

## 2021-12-07 ENCOUNTER — OFFICE VISIT (OUTPATIENT)
Dept: DIABETES SERVICES | Age: 56
End: 2021-12-07
Payer: COMMERCIAL

## 2021-12-07 VITALS
DIASTOLIC BLOOD PRESSURE: 82 MMHG | BODY MASS INDEX: 29.71 KG/M2 | SYSTOLIC BLOOD PRESSURE: 112 MMHG | RESPIRATION RATE: 14 BRPM | HEART RATE: 64 BPM | WEIGHT: 178.3 LBS | HEIGHT: 65 IN

## 2021-12-07 DIAGNOSIS — I10 ESSENTIAL HYPERTENSION: ICD-10-CM

## 2021-12-07 DIAGNOSIS — E11.9 TYPE 2 DIABETES MELLITUS WITHOUT COMPLICATION, WITHOUT LONG-TERM CURRENT USE OF INSULIN (HCC): Primary | ICD-10-CM

## 2021-12-07 DIAGNOSIS — E78.2 MIXED HYPERLIPIDEMIA: ICD-10-CM

## 2021-12-07 LAB — HBA1C MFR BLD: 5.7 %

## 2021-12-07 PROCEDURE — 99214 OFFICE O/P EST MOD 30 MIN: CPT | Performed by: NURSE PRACTITIONER

## 2021-12-07 PROCEDURE — 83036 HEMOGLOBIN GLYCOSYLATED A1C: CPT | Performed by: NURSE PRACTITIONER

## 2021-12-07 ASSESSMENT — ENCOUNTER SYMPTOMS
RESPIRATORY NEGATIVE: 1
SHORTNESS OF BREATH: 0
ABDOMINAL PAIN: 0
DIARRHEA: 0

## 2021-12-07 NOTE — PROGRESS NOTES
[address] [facility] INTERNAL MED A DEPARTMENT OF [address]  [address] [facility], [address] [place] [id number] [phone]        HISTORY:    Miroslava Stoner presents today for evaluation and management of:  Chief Complaint   Patient presents with    Diabetes     3m       HPI    Interval History:    Past DM Medications   Metformin- hypoglycemia      Current Diabetic Medications  none        DKA episodes: 0    04/06/21   At previous visit dm counseling was provided. She is not testing blood sugars she is following with counceling for eating disorder. She has not increased calorie intake as we discussed she is fearful she will lose control and binge. She also states when she is exercising she will sometimes feel hypoglycemic she will stop and have a piece of candy. She has not test during this time.   Diet:  1 shake per day, not documenting food intake but she eats the same every day, egg/yougurt for breakfast or nothing, shake for lunch, salad for dinner. No snacks. 700 calories or less per day.   Exercise: Daily, drumming, walking, dance class.   BS testing: none  Issues: denies      06/01/21   At previous visit dm counceling was provided. Yesterday she sprained her ankle. She does states sometimes she feels hypoglycemia however when she checks bs are only 70-80.   Diet: low carb low calorie  Exercise: exercise classes  BS testing: occ when she feels low, reads 70-80  Issues: denies      09/07/21   At previous visit dm counseling was provided. She continue to see counseling regarding eating disorder. She feels like to is not doing as well with this. Diet: low carb low calorie  Exercise: exercise classes  BS testing: rarely   Issues: denies     12/07/21   Miroslava Stoner is a 63 y.o. female patient who presents to clinic today for follow up on her diabetes. she has a history of hypertension, hyperlipidemia, bulimia and obesity which contributes to her diabetes.  At previous daily As needed. 100 each 3    Lancets MISC Check sugars daily 100 each 3     No current facility-administered medications for this visit. Review ofSymptoms:  Review of Systems   Constitutional: Positive for fatigue. Negative for unexpected weight change. Eyes: Negative for visual disturbance. Respiratory: Negative. Negative for shortness of breath. Cardiovascular: Negative for chest pain and leg swelling. Gastrointestinal: Negative for abdominal pain and diarrhea. Endocrine: Negative for polydipsia, polyphagia and polyuria. Genitourinary: Negative. Musculoskeletal: Negative. Skin: Negative for rash and wound. Neurological: Negative for dizziness, tremors, seizures and headaches. Psychiatric/Behavioral: Negative. Negative for confusion and decreased concentration. Theremainder of a complete 14-point review of systems is negative. Vital Signs: /82 (Site: Right Upper Arm, Position: Sitting, Cuff Size: Medium Adult)   Pulse 64   Resp 14   Ht 5' 5\" (1.651 m)   Wt 178 lb 4.8 oz (80.9 kg)   LMP  (LMP Unknown) Comment: more than a couple years  BMI 29.67 kg/m²      Wt Readings from Last 3 Encounters:   12/07/21 178 lb 4.8 oz (80.9 kg)   12/03/21 177 lb (80.3 kg)   10/21/21 186 lb (84.4 kg)     Body mass index is 29.67 kg/m².   LABS:  Hemoglobin A1C   Date Value Ref Range Status   09/07/2021 5.6 % Final   06/01/2021 5.7 % Final     Lab Results   Component Value Date    LABMICR CANNOT BE CALCULATED 02/18/2021     Lab Results   Component Value Date     08/30/2021    K 4.1 08/30/2021     08/30/2021    CO2 30 08/30/2021    BUN 17 08/30/2021    CREATININE 0.7 08/30/2021    GLUCOSE 102 08/30/2021    CALCIUM 9.6 08/30/2021    PROT 7.2 02/18/2021    LABALBU 4.4 02/18/2021    BILITOT 0.75 02/18/2021    ALKPHOS 49 02/18/2021    AST 24 02/18/2021    ALT 26 02/18/2021    LABGLOM > 60.0 08/30/2021    GFRAA >60 02/18/2021    AGRATIO 1.3 03/04/2018    GLOB 2.9 03/04/2018 Lab Results   Component Value Date    CHOL 177 02/18/2021    CHOL 185 02/03/2020    CHOL 225 (H) 04/01/2019     Lab Results   Component Value Date    TRIG 87 02/18/2021    TRIG 68 02/03/2020    TRIG 104 04/01/2019     Lab Results   Component Value Date    HDL 78 02/18/2021    HDL 73 (H) 02/03/2020    HDL 53 12/01/2016     Lab Results   Component Value Date    LDLCHOLESTEROL 82 02/18/2021    LDLCALC 98.4 02/03/2020    LDLCALC 150.2 04/01/2019    LDLCALC 164.4 (H) 04/16/2018     Lab Results   Component Value Date    VLDL NOT REPORTED 02/18/2021    VLDL 14 02/03/2020    VLDL 21 04/01/2019     Lab Results   Component Value Date    CHOLHDLRATIO 2.3 02/18/2021    CHOLHDLRATIO 2.53 02/03/2020    CHOLHDLRATIO 4.2 04/01/2019           Physical Exam  Constitutional:       Appearance: She is well-developed. Eyes:      Pupils: Pupils are equal, round, and reactive to light. Neck:      Thyroid: No thyroid mass, thyromegaly or thyroid tenderness. Cardiovascular:      Rate and Rhythm: Normal rate and regular rhythm. Heart sounds: Normal heart sounds. Pulmonary:      Effort: Pulmonary effort is normal.      Breath sounds: Normal breath sounds. Abdominal:      General: Bowel sounds are normal.      Palpations: Abdomen is soft. Skin:     General: Skin is warm and dry. Comments: Negative for open/nonhealing wounds. Negative for lipohypertrophy. Neurological:      Mental Status: She is alert and oriented to person, place, and time. ASSESSMENT/PLAN:     Diagnosis Orders   1. Type 2 diabetes mellitus without complication, without long-term current use of insulin (HCC)  POCT glycosylated hemoglobin (Hb A1C)   2. Mixed hyperlipidemia     3. Essential hypertension     4. BMI 29.0-29.9,adult       Orders Placed This Encounter   Procedures    POCT glycosylated hemoglobin (Hb A1C)     No orders of the defined types were placed in this encounter.     Requested Prescriptions      No prescriptions requested or ordered in this encounter       1. Type 2 diabetes mellitus without complication, without long-term current use of insulin (HCC)  - Stable  HbA1C goal is less than 7%. - Fasting blood glucose goal is 70-120mg/dl and postprandial blood sugar goal is less than 180 mg/dl. - Labs reviewed including most recent A1c, microalbumin and kidney function. Repeat labs due in 3 months.    -We discussed in great detail dietary modifications they can make to better improve their blood sugars. -follow up diabetes education completed, all questions answered.  -She will work on adding a protein snack for breakfast with her morning medications or adding a protein snack after dinner. Discussed signs and symptoms of hyper/hypoglycemia and how to treat. Encouraged 150 minutes of physical activity per week. Follow a low carbohydrate diet. Encouraged at least 7 hours of sleep. The patient was informed of the goals of diabetes management. This can only be accomplished by watching their diet and exercise levels. We certainly use medicines to help attain these goals. The consequences of not controlling blood sugars were discussed. These include blindness, heart disease, stroke, kidney disease, and possibly need for dialysis. They were told to be careful with their foot care as diabetics often have nerve damage, infections and risk for limb amutations . They also need a dilated eye exam yearly. We discussed the issues of diet, exercise, medication, complication avoidance, reviewed the signs and symptoms of diabetes, hypoglycemic episodes, significance of HbA1C.       - POCT glycosylated hemoglobin (Hb A1C)    2. Mixed hyperlipidemia  stable, lipid panel reviewed, continue current medications. Diet and exercise      3. Essential hypertension   stable, continue current medications. Diet and exercise Seek emergent care if chest pain develops.        4. BMI 29.0-29.9,adult  Reduce calories and increase physical activity to achieve a slow and steady weight loss to improve blood pressure, cholesterol and diabetes. -We will discuss bulimia with psychiatry upcoming appointment for further assistance with weight management. Answered all patient questions. Agrees to follow plan of care and to follow up in 3 months, sooner if needed. Call office if unexplained blood sugars less than 70 occur or above 400. Call office or access [facility] with any further questions or concerns. Be sure to bring glucometer/food log at next appointment. Total time spent reviewing chart, labs, counseling patient and documenting on the date of the encounter: 30 min.       Electronically signed by IRENE Rios CNP on 12/7/2021 at 9:07 AM      (Please note that portions of this note were completed with a voice-recognition program. Efforts were made to edit the dictation but occasionally words are mis-transcribed.)

## 2021-12-22 ENCOUNTER — PATIENT MESSAGE (OUTPATIENT)
Dept: FAMILY MEDICINE CLINIC | Age: 56
End: 2021-12-22

## 2021-12-22 DIAGNOSIS — F33.9 RECURRENT MAJOR DEPRESSION RESISTANT TO TREATMENT (HCC): ICD-10-CM

## 2021-12-22 RX ORDER — ARIPIPRAZOLE 2 MG/1
4 TABLET ORAL DAILY
Qty: 60 TABLET | Refills: 3 | Status: SHIPPED | OUTPATIENT
Start: 2021-12-22 | End: 2022-05-25

## 2021-12-22 NOTE — TELEPHONE ENCOUNTER
Spoke with patient - she is only sleeping 3-4 hours. It does not matter what time she goes to sleep she only stays asleep for about 3-4 hours and then she is up and starts to think about things and cannot go back to sleep. She is cutting herself again. Feels like she needs to be in control and that's the only way she can feel in control. She was binging over the weekend and it made her really mad so that is when she started cutting again.

## 2021-12-22 NOTE — TELEPHONE ENCOUNTER
From: Rosy Smoker  To: Dr. Medrano Temo: 12/22/2021 4:36 AM EST  Subject: Not Sleeping     Im still not sleeping only getting around 3 to 4 hours a night. Could someone call me about something else.

## 2021-12-22 NOTE — TELEPHONE ENCOUNTER
Increase the abilify to 4 mg daily at bedtime. HOpefully this will help with the sleep. Also make sure no suicidal thoughts. See when her psychiatry appointment is. If not safe then would recommend in patient treatment.

## 2022-01-07 LAB
ALBUMIN/GLOBULIN RATIO: 1.63 G/DL
ALBUMIN: 4.4 G/DL (ref 3.5–5)
ALP BLD-CCNC: 51 UNITS/L (ref 38–126)
ALT SERPL-CCNC: 41 UNITS/L (ref 4–35)
ANION GAP SERPL CALCULATED.3IONS-SCNC: 4.2 MMOL/L
AST SERPL-CCNC: 34 UNITS/L (ref 14–36)
BASOPHILS %: 1.16 (ref 0–3)
BASOPHILS ABSOLUTE: 0.06 (ref 0–0.3)
BILIRUB SERPL-MCNC: 0.6 MG/DL (ref 0.2–1.3)
BUN BLDV-MCNC: 25 MG/DL (ref 7–17)
CALCIUM SERPL-MCNC: 9.6 MG/DL (ref 8.4–10.2)
CHLORIDE BLD-SCNC: 101 MMOL/L (ref 98–120)
CHOLESTEROL/HDL RATIO: 2.28 RATIO (ref 0–4.5)
CHOLESTEROL: 223 MG/DL (ref 50–200)
CO2: 34 MMOL/L (ref 22–31)
CREAT SERPL-MCNC: 0.6 MG/DL (ref 0.5–1)
EOSINOPHILS %: 2.65 (ref 0–10)
EOSINOPHILS ABSOLUTE: 0.14 (ref 0–1.1)
GFR CALCULATED: > 60
GLOBULIN: 2.7 G/DL
GLUCOSE: 109 MG/DL (ref 65–105)
HCT VFR BLD CALC: 37.2 % (ref 37–47)
HDLC SERPL-MCNC: 98 MG/DL (ref 36–68)
HEMOGLOBIN: 12.5 (ref 12–16)
LDL CHOLESTEROL CALCULATED: 100.4 MG/DL (ref 0–160)
LYMPHOCYTE %: 28.81 (ref 20–51.1)
LYMPHOCYTES ABSOLUTE: 1.53 (ref 1–5.5)
MAGNESIUM: 2 MG/DL (ref 1.6–2.3)
MCH RBC QN AUTO: 32.3 PG (ref 28.5–32.5)
MCHC RBC AUTO-ENTMCNC: 33.6 G/DL (ref 32–37)
MCV RBC AUTO: 96.3 FL (ref 80–94)
MONOCYTES %: 9.29 (ref 1.7–9.3)
MONOCYTES ABSOLUTE: 0.5 (ref 0.1–1)
NEUTROPHILS %: 58.08 (ref 42.2–75.2)
NEUTROPHILS ABSOLUTE: 3.1 (ref 2–8.1)
PDW BLD-RTO: 11.2 % (ref 8.5–15.5)
PLATELET # BLD: 308.8 THOU/MM3 (ref 130–400)
POTASSIUM SERPL-SCNC: 4.2 MMOL/L (ref 3.6–5)
RBC: 3.87 M/UL (ref 4.2–5.4)
SODIUM BLD-SCNC: 135 MMOL/L (ref 135–145)
TOTAL PROTEIN, SERUM: 7.1 G/DL (ref 6.3–8.2)
TRIGL SERPL-MCNC: 123 MG/DL (ref 10–250)
VLDLC SERPL CALC-MCNC: 24.6 MG/DL (ref 0–50)
WBC: 5.3 THOU/ML3 (ref 4.8–10.8)

## 2022-01-11 LAB — VITAMIN D 25-HYDROXY: 24.7 NG/ML (ref 30–100)

## 2022-01-19 LAB
ACETAMINOPHEN LEVEL: < 10 MCG/ML (ref 0–30)
ALBUMIN/GLOBULIN RATIO: 1.6 G/DL
ALBUMIN: 4.6 G/DL (ref 3.5–5)
ALP BLD-CCNC: 65 UNITS/L (ref 38–126)
ALT SERPL-CCNC: 43 UNITS/L (ref 4–35)
AMPHETAMINE SCREEN, URINE: NEGATIVE
ANION GAP SERPL CALCULATED.3IONS-SCNC: 5.4 MMOL/L
AST SERPL-CCNC: 33 UNITS/L (ref 14–36)
BACTERIA, URINE: ABNORMAL
BARBITURATE SCREEN, URINE: NEGATIVE
BASOPHILS %: 1.11 (ref 0–3)
BASOPHILS ABSOLUTE: 0.05 (ref 0–0.3)
BENZODIAZEPINES, URINE SCREEN: NEGATIVE
BILIRUB SERPL-MCNC: 0.8 MG/DL (ref 0.2–1.3)
BILIRUBIN URINE: NEGATIVE
BLOOD, URINE: NEGATIVE
BUN BLDV-MCNC: 19 MG/DL (ref 7–17)
CALCIUM SERPL-MCNC: 9.6 MG/DL (ref 8.4–10.2)
CANNABINOID SCREEN URINE: NEGATIVE
CASTS UA: ABNORMAL
CHLORIDE BLD-SCNC: 104 MMOL/L (ref 98–120)
CLARITY: CLEAR
CO2: 30 MMOL/L (ref 22–31)
COCAINE(METAB.)SCREEN, URINE: NEGATIVE
COLOR, URINE: YELLOW
CREAT SERPL-MCNC: 0.7 MG/DL (ref 0.5–1)
CREATININE CLEARANCE: 79.79
CRYSTALS, UA: ABNORMAL
EOSINOPHILS %: 1.44 (ref 0–10)
EOSINOPHILS ABSOLUTE: 0.07 (ref 0–1.1)
ETHANOL: < 10 MG/DL (ref 0–10)
GFR CALCULATED: > 60
GLOBULIN: 3 G/DL
GLUCOSE URINE: NEGATIVE MG/DL
GLUCOSE: 108 MG/DL (ref 65–105)
HCT VFR BLD CALC: 40.2 % (ref 37–47)
HEMOGLOBIN: 13.6 (ref 12–16)
KETONES, URINE: NEGATIVE MG/DL
LEUKOCYTE ESTERASE, URINE: ABNORMAL
LYMPHOCYTE %: 22.12 (ref 20–51.1)
LYMPHOCYTES ABSOLUTE: 1.01 (ref 1–5.5)
MCH RBC QN AUTO: 31.7 PG (ref 28.5–32.5)
MCHC RBC AUTO-ENTMCNC: 33.8 G/DL (ref 32–37)
MCV RBC AUTO: 94 FL (ref 80–94)
MONOCYTES %: 7.33 (ref 1.7–9.3)
MONOCYTES ABSOLUTE: 0.34 (ref 0.1–1)
MUCUS, URINE: ABNORMAL
NEUTROPHILS %: 68 (ref 42.2–75.2)
NEUTROPHILS ABSOLUTE: 3.12 (ref 2–8.1)
NITRITE, URINE: NEGATIVE
OPIATE SCREEN, URINE: NEGATIVE
OXYCODONE SCREEN URINE: NEGATIVE
PDW BLD-RTO: 10.6 % (ref 8.5–15.5)
PH UA: 7 (ref 5–8.5)
PHENCYCLIDINE SCREEN URINE: NEGATIVE
PLATELET # BLD: 296.9 THOU/MM3 (ref 130–400)
POTASSIUM SERPL-SCNC: 3.9 MMOL/L (ref 3.6–5)
PROTEIN UA: NEGATIVE MG/DL
RBC URINE: ABNORMAL (ref 0–2)
RBC: 4.28 M/UL (ref 4.2–5.4)
SALICYLATE LEVEL: < 1 MG/DL (ref 0–20)
SARS-COV-2, RAPID: NEGATIVE
SODIUM BLD-SCNC: 139 MMOL/L (ref 135–145)
SPECIFIC GRAVITY, URINE: 1.01 MG/DL (ref 1–1.03)
SQUAMOUS EPITHELIAL: ABNORMAL
TOTAL PROTEIN, SERUM: 7.5 G/DL (ref 6.3–8.2)
TRICHOMONAS, URINE: ABNORMAL
TRICYCLIC ANTIDEPRESSANTS, UR: NEGATIVE
TSH REFLEX FT4: 2.17 MIU/ML (ref 0.49–4.67)
UROBILINOGEN, URINE: 0.2 MG/DL (ref 0.2–1)
WBC URINE: ABNORMAL (ref 0–4)
WBC: 4.6 THOU/ML3 (ref 4.8–10.8)
YEAST, URINE: ABNORMAL

## 2022-03-01 ENCOUNTER — OFFICE VISIT (OUTPATIENT)
Dept: DIABETES SERVICES | Age: 57
End: 2022-03-01
Payer: COMMERCIAL

## 2022-03-01 VITALS
HEIGHT: 65 IN | SYSTOLIC BLOOD PRESSURE: 98 MMHG | RESPIRATION RATE: 14 BRPM | WEIGHT: 197 LBS | BODY MASS INDEX: 32.82 KG/M2 | DIASTOLIC BLOOD PRESSURE: 62 MMHG | HEART RATE: 76 BPM

## 2022-03-01 DIAGNOSIS — F50.9 EATING DISORDER, UNSPECIFIED TYPE: ICD-10-CM

## 2022-03-01 DIAGNOSIS — I10 ESSENTIAL HYPERTENSION: ICD-10-CM

## 2022-03-01 DIAGNOSIS — E11.9 TYPE 2 DIABETES MELLITUS WITHOUT COMPLICATION, WITHOUT LONG-TERM CURRENT USE OF INSULIN (HCC): Primary | ICD-10-CM

## 2022-03-01 DIAGNOSIS — E78.2 MIXED HYPERLIPIDEMIA: ICD-10-CM

## 2022-03-01 LAB — HBA1C MFR BLD: 5.5 %

## 2022-03-01 PROCEDURE — 99214 OFFICE O/P EST MOD 30 MIN: CPT | Performed by: NURSE PRACTITIONER

## 2022-03-01 PROCEDURE — 83036 HEMOGLOBIN GLYCOSYLATED A1C: CPT | Performed by: NURSE PRACTITIONER

## 2022-03-01 RX ORDER — ACETAMINOPHEN 160 MG
TABLET,DISINTEGRATING ORAL
COMMUNITY
Start: 2022-02-07 | End: 2022-05-25

## 2022-03-01 RX ORDER — BUPROPION HYDROCHLORIDE 150 MG/1
150 TABLET ORAL DAILY
COMMUNITY
Start: 2022-02-07 | End: 2022-05-25

## 2022-03-01 RX ORDER — HYDROXYZINE 50 MG/1
50 TABLET, FILM COATED ORAL 2 TIMES DAILY PRN
COMMUNITY
Start: 2022-02-07 | End: 2022-05-25

## 2022-03-01 ASSESSMENT — ENCOUNTER SYMPTOMS
RESPIRATORY NEGATIVE: 1
DIARRHEA: 0
ABDOMINAL PAIN: 0
SHORTNESS OF BREATH: 0

## 2022-03-01 NOTE — PROGRESS NOTES
[facility] [address]  200 [facility], [address] [place] [id number] [phone]        HISTORY:    Yamilex Francis presents today for evaluation and management of:  Chief Complaint   Patient presents with    Diabetes     3 month appt. HPI    Interval History:    Past DM Medications   Metformin- hypoglycemia      Current Diabetic Medications  none        DKA episodes: 0       06/01/21   At previous visit dm counceling was provided. Yesterday she sprained her ankle. She does states sometimes she feels hypoglycemia however when she checks bs are only 70-80.   Diet: low carb low calorie  Exercise: exercise classes  BS testing: occ when she feels low, reads 70-80  Issues: denies      09/07/21   At previous visit dm counseling was provided. She continue to see counseling regarding eating disorder. She feels like to is not doing as well with this.   Diet: low carb low calorie  Exercise: exercise classes  BS testing: rarely   Issues: denies      12/07/21   Yamilex Francis is a 63 y.o. female patient who presents to clinic today for follow up on her diabetes. she has a history of hypertension, hyperlipidemia, bulimia and obesity which contributes to her diabetes. At previous visit discussed increase calorie intake. she denies any signs or symptoms of hyper/hypoglycemia. she states they are taking their medications as prescribed without any adverse effects. She does have an appointment with psychiatry at [address] [place] [address] with Tor Perry np in January for medication management. Diet: Skips breakfast protein shake for lunch salad for dinner  Exercise: exercise classes  BS testing: Rarely   issues:   Diabetic foot exam up-to-date: Yes  Diabetic retinal exam up-to-date: Yes  Hypoglycemia as needed treatment: N/A    03/01/22   Yamilex Francis is a 61 y.o. female patient who presents to clinic today for follow up on her diabetes.  she has a history of hypertension, hyperlipidemia, bulimia and obesity  which contributes to her diabetes. At previous visit diabetes counseling was provided. she denies any signs or symptoms of hyper/hypoglycemia. she states they are taking their medications as prescribed without any adverse effects. She states she continue to see counseling and following with mental health regarding depression and disordered eating. She states she is not binging and purging. She denies any current thoughts of self harm. Diet: binging   Exercise: exercise classes  BS testing: none  Issues: denies   Diabetic foot exam up-to-date: No  Diabetic retinal exam up-to-date: Yes  Hypoglycemia as needed treatment: snack     High cholesterol-  Takes lipitor and denies any adverse effects with its use. Watches diet and exercise.      Hypertension-   Watches diet and exercise. Denies any chest pain, dizziness or edema.       Obesity- Working on weight loss.         Past Medical History:   Diagnosis Date    History of bulimia     in 20 and 28's    Hyperlipidemia     Hypertension     Type 2 [facility] without complication[facility] long[facility]term [facility] 7[facility]2018     Family History   Problem Relation Age of Onset    Arthritis Mother     Cancer Mother         endometrial     Social History     Tobacco Use    Smoking status: Never Smoker    Smokeless tobacco: Never Used   Substance Use Topics    Alcohol use: No    Drug use: Not on file     No Known Allergies    MEDICATIONS:  Current Outpatient Medications   Medication Sig Dispense Refill    Cholecalciferol (VITAMIN D3) 50 MCG (2000 UT) CAPS TAKE 1 CAPSULE BY MOUTH ONCE DAILY      buPROPion (WELLBUTRIN XL) 150 MG extended release tablet Take 150 mg by mouth daily      hydrOXYzine (ATARAX) 50 MG tablet Take 50 mg by mouth 2 times daily as needed      VORTIoxetine (TRINTELLIX) 10 MG TABS tablet Take 20 mg by mouth daily       ARIPiprazole (ABILIFY) 2 MG tablet Take 2 tablets by mouth daily 60 tablet 3    busPIRone (BUSPAR) 10 MG tablet Take 1 tablet by mouth once daily (Patient taking differently: Take 10 mg by mouth 3 times daily ) 30 tablet 5    atorvastatin (LIPITOR) 20 MG tablet Take 1 tablet by mouth once daily 90 tablet 5    Lancets MISC Check sugars daily 100 each 3    glucose blood VI test strips (ASCENSIA AUTODISC VI;ONE TOUCH ULTRA TEST VI) strip 1 each by In Vitro route daily As needed. 100 each 3     No current facility-administered medications for this visit. Review ofSymptoms:  Review of Systems   Constitutional: Positive for fatigue. Negative for unexpected weight change. Eyes: Negative for visual disturbance. Respiratory: Negative. Negative for shortness of breath. Cardiovascular: Negative for chest pain and leg swelling. Gastrointestinal: Negative for abdominal pain and diarrhea. Endocrine: Negative for polydipsia, polyphagia and polyuria. Genitourinary: Negative. Musculoskeletal: Negative. Skin: Negative for rash and wound. Neurological: Negative for dizziness, tremors, seizures and headaches. Psychiatric/Behavioral: Negative. Negative for confusion and decreased concentration. Theremainder of a complete 14-point review of systems is negative. Vital Signs: BP 98/62 (Site: Left Upper Arm, Position: Sitting, Cuff Size: Large Adult)   Pulse 76   Resp 14   Ht 5' 5\" (1.651 m)   Wt 197 lb (89.4 kg)   LMP  (LMP Unknown) Comment: more than a couple years  BMI 32.78 kg/m²      Wt Readings from Last 3 Encounters:   03/01/22 197 lb (89.4 kg)   12/07/21 178 lb 4.8 oz (80.9 kg)   12/03/21 177 lb (80.3 kg)     Body mass index is 32.78 kg/m².   LABS:  Hemoglobin A1C   Date Value Ref Range Status   03/01/2022 5.5 % Final   12/07/2021 5.7 % Final     Lab Results   Component Value Date    LABMICR CANNOT BE CALCULATED 02/18/2021     Lab Results   Component Value Date     01/19/2022    K 3.9 01/19/2022     01/19/2022    CO2 30 01/19/2022    BUN 19 (H) 01/19/2022    CREATININE 0.7 01/19/2022    GLUCOSE 108 (H) 01/19/2022    CALCIUM 9.6 01/19/2022    PROT 7.2 02/18/2021    LABALBU 4.6 01/19/2022    BILITOT 0.8 01/19/2022    ALKPHOS 65 01/19/2022    AST 33 01/19/2022    ALT 43 (H) 01/19/2022    LABGLOM > 60.0 01/19/2022    GFRAA >60 02/18/2021    AGRATIO 1.3 03/04/2018    GLOB 3.0 01/19/2022     Lab Results   Component Value Date    CHOL 223 (H) 01/07/2022    CHOL 177 02/18/2021    CHOL 185 02/03/2020     Lab Results   Component Value Date    TRIG 123 01/07/2022    TRIG 87 02/18/2021    TRIG 68 02/03/2020     Lab Results   Component Value Date    HDL 98 (H) 01/07/2022    HDL 78 02/18/2021    HDL 73 (H) 02/03/2020     Lab Results   Component Value Date    LDLCHOLESTEROL 82 02/18/2021    LDLCALC 100.4 01/07/2022    LDLCALC 98.4 02/03/2020    LDLCALC 150.2 04/01/2019     Lab Results   Component Value Date    VLDL 24.600 01/07/2022    VLDL NOT REPORTED 02/18/2021    VLDL 14 02/03/2020     Lab Results   Component Value Date    CHOLHDLRATIO 2.28 01/07/2022    CHOLHDLRATIO 2.3 02/18/2021    CHOLHDLRATIO 2.53 02/03/2020           Physical Exam  Constitutional:       Appearance: She is well-developed. Eyes:      Pupils: Pupils are equal, round, and reactive to light. Neck:      Thyroid: No thyroid mass, thyromegaly or thyroid tenderness. Cardiovascular:      Rate and Rhythm: Normal rate and regular rhythm. Heart sounds: Normal heart sounds. Pulmonary:      Effort: Pulmonary effort is normal.      Breath sounds: Normal breath sounds. Abdominal:      General: Bowel sounds are normal.      Palpations: Abdomen is soft. Skin:     General: Skin is warm and dry. Comments: Negative for open/nonhealing wounds. Negative for lipohypertrophy. Neurological:      Mental Status: She is alert and oriented to person, place, and time. Diabetic foot check:Normal strength and range of motion of toes, feet, and ankles bilaterally.  No joint deformity. No cyanosis or clubbing. 100% sensation at all 22 test points with the 10 gram filament. Dorsalis pedis pulses intactbilaterally. Capillary refill at the toes was less than 2 seconds. Vibratory sensation (with 128 Hz tuning fork) intact bilaterally. Light touch sensation intact bilaterally. Hair growth present on feet and toes bilaterally. No skin breakdown, erythema, rub spots, blisters, scaling, or ulcers. No calluses or corns. Toenails thin and not ingrown. No evidence of fungal infection. ASSESSMENT/PLAN:     Diagnosis Orders   1. Type 2 diabetes mellitus without complication, without long-term current use of insulin ([facility])  POCT Hb A1C (glycosylated hemoglobin)     DIABETES FOOT EXAM   2. Mixed hyperlipidemia     3. Essential hypertension     4. Eating disorder, unspecified type       Orders Placed This Encounter   Procedures    POCT Hb A1C (glycosylated hemoglobin)    HM DIABETES FOOT EXAM     No orders of the defined types were placed in this encounter. Requested Prescriptions      No prescriptions requested or ordered in this encounter       1. Type 2 diabetes mellitus without complication, without long-term current use of insulin ([facility])  - Stable  HbA1C goal is less than 7%. - Fasting blood glucose goal is 70-120mg/dl and postprandial blood sugar goal is less than 180 mg/dl. - Labs reviewed including most recent A1c, microalbumin and kidney function. Repeat labs due in 3 months.    -We discussed in great detail dietary modifications they can make to better improve their blood sugars. -follow up diabetes education completed, all questions answered. -7 day meal plan given to pt  -advised pt to stop daily weights       Discussed signs and symptoms of hyper/hypoglycemia and how to treat. Encouraged 150 minutes of physical activity per week. Follow a low carbohydrate diet. Encouraged at least 7 hours of sleep. The patient was informed of the goals of diabetes management.  This can only be accomplished by watching their diet and exercise levels. We certainly use medicines to help attain these goals. The consequences of not controlling blood sugars were discussed. These include blindness, heart disease, stroke, kidney disease, and possibly need for dialysis. They were told to be careful with their foot care as diabetics often have nerve damage, infections and risk for limb amutations . They also need a dilated eye exam yearly. We discussed the issues of diet, exercise, medication, complication avoidance, reviewed the signs and symptoms of diabetes, hypoglycemic episodes, significance of HbA1C.       - POCT Hb A1C (glycosylated hemoglobin)  -  DIABETES FOOT EXAM    2. Mixed hyperlipidemia  stable, lipid panel reviewed, continue current medications. Diet and exercise      3. Essential hypertension   stable, continue current medications. Diet and exercise Seek emergent care if chest pain develops. 4. Eating disorder, unspecified type  -Recently started on welbutrin for binge eating.   -continue to follow with mental jamia provider  -discussed with Dr. Jennifer James and is agreable to current plan. Reviewed recent labs and Pcp plans on checking bmp every few months. She will also reach out to pt for monitoring. Answered all patient questions. Agrees to follow plan of care and to follow up in 6 months, sooner if needed. Call office if unexplained blood sugars less than 70 occur or above 400. Call office or access [email or web address] with any further questions or concerns. Be sure to bring glucometer/food log at next appointment. Total time spent reviewing chart, labs, counseling patient and documenting on the date of the encounter: 30 min.       Electronically signed by IRENE Cheung CNP on 3/1/2022 at 9:15 AM      (Please note that portions of this note were completed with a voice-recognition program. Efforts were made to edit the dictation but occasionally words are mis-transcribed.)

## 2022-03-04 ENCOUNTER — OFFICE VISIT (OUTPATIENT)
Dept: FAMILY MEDICINE CLINIC | Age: 57
End: 2022-03-04
Payer: COMMERCIAL

## 2022-03-04 VITALS
WEIGHT: 202.1 LBS | SYSTOLIC BLOOD PRESSURE: 120 MMHG | BODY MASS INDEX: 34.5 KG/M2 | HEIGHT: 64 IN | RESPIRATION RATE: 16 BRPM | HEART RATE: 86 BPM | DIASTOLIC BLOOD PRESSURE: 70 MMHG | OXYGEN SATURATION: 99 %

## 2022-03-04 DIAGNOSIS — E78.2 MIXED HYPERLIPIDEMIA: ICD-10-CM

## 2022-03-04 DIAGNOSIS — Z51.81 MEDICATION MONITORING ENCOUNTER: ICD-10-CM

## 2022-03-04 DIAGNOSIS — K21.00 GASTROESOPHAGEAL REFLUX DISEASE WITH ESOPHAGITIS WITHOUT HEMORRHAGE: ICD-10-CM

## 2022-03-04 DIAGNOSIS — I10 PRIMARY HYPERTENSION: Primary | ICD-10-CM

## 2022-03-04 DIAGNOSIS — E11.9 TYPE 2 DIABETES MELLITUS WITHOUT COMPLICATION, WITHOUT LONG-TERM CURRENT USE OF INSULIN (HCC): ICD-10-CM

## 2022-03-04 DIAGNOSIS — F33.9 RECURRENT MAJOR DEPRESSION RESISTANT TO TREATMENT (HCC): ICD-10-CM

## 2022-03-04 PROCEDURE — 99212 OFFICE O/P EST SF 10 MIN: CPT | Performed by: FAMILY MEDICINE

## 2022-03-04 PROCEDURE — 99214 OFFICE O/P EST MOD 30 MIN: CPT | Performed by: FAMILY MEDICINE

## 2022-03-04 RX ORDER — OMEPRAZOLE 20 MG/1
20 CAPSULE, DELAYED RELEASE ORAL
Qty: 30 CAPSULE | Refills: 5 | Status: SHIPPED | OUTPATIENT
Start: 2022-03-04 | End: 2022-05-25

## 2022-03-04 SDOH — ECONOMIC STABILITY: FOOD INSECURITY: WITHIN THE PAST 12 MONTHS, YOU WORRIED THAT YOUR FOOD WOULD RUN OUT BEFORE YOU GOT MONEY TO BUY MORE.: NEVER TRUE

## 2022-03-04 SDOH — ECONOMIC STABILITY: FOOD INSECURITY: WITHIN THE PAST 12 MONTHS, THE FOOD YOU BOUGHT JUST DIDN'T LAST AND YOU DIDN'T HAVE MONEY TO GET MORE.: NEVER TRUE

## 2022-03-04 ASSESSMENT — PATIENT HEALTH QUESTIONNAIRE - PHQ9
SUM OF ALL RESPONSES TO PHQ QUESTIONS 1-9: 13
1. LITTLE INTEREST OR PLEASURE IN DOING THINGS: 2
7. TROUBLE CONCENTRATING ON THINGS, SUCH AS READING THE NEWSPAPER OR WATCHING TELEVISION: 1
SUM OF ALL RESPONSES TO PHQ9 QUESTIONS 1 & 2: 3
SUM OF ALL RESPONSES TO PHQ QUESTIONS 1-9: 13
8. MOVING OR SPEAKING SO SLOWLY THAT OTHER PEOPLE COULD HAVE NOTICED. OR THE OPPOSITE, BEING SO FIGETY OR RESTLESS THAT YOU HAVE BEEN MOVING AROUND A LOT MORE THAN USUAL: 0
SUM OF ALL RESPONSES TO PHQ QUESTIONS 1-9: 13
5. POOR APPETITE OR OVEREATING: 3
6. FEELING BAD ABOUT YOURSELF - OR THAT YOU ARE A FAILURE OR HAVE LET YOURSELF OR YOUR FAMILY DOWN: 0
10. IF YOU CHECKED OFF ANY PROBLEMS, HOW DIFFICULT HAVE THESE PROBLEMS MADE IT FOR YOU TO DO YOUR WORK, TAKE CARE OF THINGS AT HOME, OR GET ALONG WITH OTHER PEOPLE: 1
SUM OF ALL RESPONSES TO PHQ QUESTIONS 1-9: 13
9. THOUGHTS THAT YOU WOULD BE BETTER OFF DEAD, OR OF HURTING YOURSELF: 0
3. TROUBLE FALLING OR STAYING ASLEEP: 3
2. FEELING DOWN, DEPRESSED OR HOPELESS: 1
4. FEELING TIRED OR HAVING LITTLE ENERGY: 3

## 2022-03-04 ASSESSMENT — SOCIAL DETERMINANTS OF HEALTH (SDOH): HOW HARD IS IT FOR YOU TO PAY FOR THE VERY BASICS LIKE FOOD, HOUSING, MEDICAL CARE, AND HEATING?: NOT HARD AT ALL

## 2022-03-04 NOTE — PROGRESS NOTES
[address] [facility]  [address]  Dept: [phone]  Dept MUO:[phone]    Mahsa Kaur is a 61 y.o. female who presents today for her medical conditions/complaints as notedbelow. Mahsa Kaur is c/o of 3 Month Follow-Up (Hypertension, Diabetes- last HGB A1C was 3/1/22= 5.5, Hyperlipidemia- last lipids 1/20/22)      HPI:     HPI  Mahsa Kaur is a 61 y.o. female who presents for follow-up of hypertension, diabetes, and hyperlipidemia. She indicates that she is feeling well and denies any symptoms referable to her elevated blood pressure or diabetes. Specifically denies chest pain, palpitations, dyspnea, peripheral edema, thirst, frequent urination, and blurred vision. Current medication regimen is as listed below. She denies any side effects of medication, and has been taking it regularly. Home glucose readings have been Not checking. Last eye exam was 7/21 withEliceo. Diabetic complications includenone. shehas been exercising regularly with exercise classes 4-5 times per week. Hasbeen following a diabeticdiet but does continue to have episodes of binging and purging. Has had more binging and purging behaviors. Recently started on the wellbutrin from her psychiatrist to help with this. Has been seeing her counselor also. Has had more ST all the time. Feels hoarse all the time-does note that this could be related to the purging behaviors. HAs had regular dental check ups and has no dental decay at this time.     Is still taking the atorvastatin tolerating this well without muscle pain    BP Readings from Last 3 Encounters:   03/04/22 120/70   03/01/22 98/62   12/07/21 112/82          (goal 120/80)    Wt Readings from Last 3 Encounters:   03/04/22 202 lb 1.6 oz (91.7 kg)   03/01/22 197 lb (89.4 kg)   12/07/21 178 lb 4.8 oz (80.9 kg)        Past Medical History:   Diagnosis Date    History of bulimia     in 19 and 30's    Hyperlipidemia     Hypertension     Type 2 diabetes mellitus without complication, without long-term current use of insulin ([facility] 7/29/2018      Past Surgical History:   Procedure Laterality Date    ANKLE SURGERY      scope    CERVICAL POLYP REMOVAL  01/25/2017    CHOLECYSTECTOMY      DILATION AND CURETTAGE OF UTERUS  01/2017    due to menorrhagia    TUBAL LIGATION         Family History   Problem Relation Age of Onset    Arthritis Mother     Cancer Mother         endometrial       Social History     Tobacco Use    Smoking status: Never Smoker    Smokeless tobacco: Never Used   Substance Use Topics    Alcohol use: No      Current Outpatient Medications   Medication Sig Dispense Refill    omeprazole (PRILOSEC) 20 MG delayed release capsule Take 1 capsule by mouth every morning (before breakfast) 30 capsule 5    Cholecalciferol (VITAMIN D3) 50 MCG (2000 UT) CAPS TAKE 1 CAPSULE BY MOUTH ONCE DAILY      buPROPion (WELLBUTRIN XL) 150 MG extended release tablet Take 150 mg by mouth daily      hydrOXYzine (ATARAX) 50 MG tablet Take 50 mg by mouth 2 times daily as needed      VORTIoxetine (TRINTELLIX) 10 MG TABS tablet Take 20 mg by mouth daily       ARIPiprazole (ABILIFY) 2 MG tablet Take 2 tablets by mouth daily 60 tablet 3    busPIRone (BUSPAR) 10 MG tablet Take 1 tablet by mouth once daily (Patient taking differently: Take 10 mg by mouth 3 times daily ) 30 tablet 5    atorvastatin (LIPITOR) 20 MG tablet Take 1 tablet by mouth once daily 90 tablet 5    glucose blood VI test strips (ASCENSIA AUTODISC VI;ONE TOUCH ULTRA TEST VI) strip 1 each by In Vitro route daily As needed. 100 each 3    Lancets MISC Check sugars daily 100 each 3     No current facility-administered medications for this visit.      No Known Allergies    Health Maintenance   Topic Date Due    HIV screen  Never done    Hepatitis B vaccine (1 of 3 - Risk 3-dose series) Never done    Colorectal Cancer Screen  Never done    Breast cancer screen  08/20/2021    Diabetic microalbuminuria test  02/18/2022    Cervical cancer screen  01/25/2023 (Originally 1/25/2020)    Diabetic retinal exam  07/12/2022    Lipid screen  01/07/2023    Diabetic foot exam  03/01/2023    A1C test (Diabetic or Prediabetic)  03/01/2023    Depression Monitoring  03/04/2023    Pneumococcal 0-64 years Vaccine (2 of 2 - PPSV23) 12/31/2029    DTaP/Tdap/Td vaccine (2 - Td or Tdap) 11/04/2031    Flu vaccine  Completed    Shingles Vaccine  Completed    COVID-19 Vaccine  Completed    Hepatitis C screen  Completed    Hepatitis A vaccine  Aged Out    Hib vaccine  Aged Out    Meningococcal (ACWY) vaccine  Aged Out       Subjective:      Review of Systems    Objective:     /70 (Site: Right Upper Arm, Position: Sitting, Cuff Size: Large Adult)   Pulse 86   Resp 16   Ht 5' 4.3\" (1.633 m)   Wt 202 lb 1.6 oz (91.7 kg)   LMP  (LMP Unknown) Comment: more than a couple years  SpO2 99%   BMI 34.37 kg/m²     Physical Exam  Vitals and nursing note reviewed. Constitutional:       Appearance: Normal appearance. She is well-developed. HENT:      Head: Normocephalic and atraumatic. Eyes:      Conjunctiva/sclera: Conjunctivae normal.   Neck:      Thyroid: No thyromegaly. Vascular: No JVD. Cardiovascular:      Rate and Rhythm: Normal rate and regular rhythm. Heart sounds: Normal heart sounds. No murmur heard. No friction rub. No gallop. Pulmonary:      Effort: Pulmonary effort is normal. No respiratory distress. Breath sounds: Normal breath sounds. Abdominal:      Palpations: Abdomen is soft. There is no mass. Tenderness: There is no abdominal tenderness. Musculoskeletal:      Cervical back: Normal range of motion and neck supple. Right lower leg: No edema. Left lower leg: No edema. Lymphadenopathy:      Cervical: No cervical adenopathy. Skin:     General: Skin is warm.    Neurological:      General: No focal deficit present. Mental Status: She is alert and oriented to person, place, and time. Psychiatric:         Mood and Affect: Mood normal.         Behavior: Behavior normal.         Thought Content: Thought content normal.         Judgment: Judgment normal.         Assessment/Plan:     1. Primary hypertension  -     Comprehensive Metabolic Panel; Future  2. Mixed hyperlipidemia  -     Lipid Panel; Future  3. Type 2 diabetes mellitus without complication, without long-term current use of insulin (HCC)  -     Comprehensive Metabolic Panel; Future  4. Medication monitoring encounter  -     Vitamin B12; Future  -     Vitamin D 25 Hydroxy; Future  5. Gastroesophageal reflux disease with esophagitis without hemorrhage  -     omeprazole (PRILOSEC) 20 MG delayed release capsule; Take 1 capsule by mouth every morning (before breakfast), Disp-30 capsule, R-5Normal  6. Recurrent major depression resistant to treatment [facility])    Hypertension hyperlipidemia are both asymptomatic well-controlled at this time. The diabetes is also well controlled. Encouraged her to continue with her exercise for both her physical and mental health. Encouraged her to continue to work on the purging behaviors with psychiatry. Add in the omeprazole for the GERD symptoms that she is experiencing. Continue with psychiatry plan of care    Lab Results   Component Value Date    WBC 4.6 (L) 01/19/2022    HGB 13.6 01/19/2022    HCT 40.2 01/19/2022    .9 01/19/2022    CHOL 223 (H) 01/07/2022    TRIG 123 01/07/2022    HDL 98 (H) 01/07/2022    ALT 43 (H) 01/19/2022    AST 33 01/19/2022     01/19/2022    K 3.9 01/19/2022     01/19/2022    CREATININE 0.7 01/19/2022    BUN 19 (H) 01/19/2022    CO2 30 01/19/2022    TSH 0.90 12/03/2021    LABA1C 5.5 03/01/2022    LABA1C 5.7 12/07/2021    LABA1C 5.6 09/07/2021    LABMICR CANNOT BE CALCULATED 02/18/2021       Return in about 4 months (around 7/4/2022).               Multiple labs and other testing may have been ordered which may not be completely evident from the above note due to system interface incompatibilities. Patient given educational materials - see patientinstructions. Discussed use, benefit, and side effects of prescribed medications. All patient questions answered. Pt voiced understanding. Reviewed health maintenance. Instructed to continue current medications, diet andexercise. Patient agreed with treatment plan. Follow up as directed.      (Please note that portions of this note were completed with a voice-recognition program. Efforts were made to edit the dictation but occasionally words are mis-transcribed.)    Electronically signed by Ailyn Wolf MD on 3/13/2022

## 2022-05-24 ENCOUNTER — PATIENT MESSAGE (OUTPATIENT)
Dept: FAMILY MEDICINE CLINIC | Age: 57
End: 2022-05-24

## 2022-05-24 NOTE — TELEPHONE ENCOUNTER
From: Waqas Kimbrough  To: Dr. Yanni Ferrera: 5/24/2022 7:42 AM EDT  Subject: Covid     I tested positive for Covid. I did a test at home. I have a really bad sore throat is there anything I can take?

## 2022-05-24 NOTE — TELEPHONE ENCOUNTER
Patient is not feeling well- Achy, st, cough ha, temp 100  No sob, cp when she coughs. Sx started 5/22.   Video appointment scheduled 5/25/22

## 2022-05-25 ENCOUNTER — TELEMEDICINE (OUTPATIENT)
Dept: FAMILY MEDICINE CLINIC | Age: 57
End: 2022-05-25
Payer: COMMERCIAL

## 2022-05-25 DIAGNOSIS — E11.9 TYPE 2 DIABETES MELLITUS WITHOUT COMPLICATION, WITHOUT LONG-TERM CURRENT USE OF INSULIN (HCC): ICD-10-CM

## 2022-05-25 DIAGNOSIS — U07.1 COVID-19: Primary | ICD-10-CM

## 2022-05-25 DIAGNOSIS — I10 PRIMARY HYPERTENSION: ICD-10-CM

## 2022-05-25 PROCEDURE — 3044F HG A1C LEVEL LT 7.0%: CPT | Performed by: FAMILY MEDICINE

## 2022-05-25 PROCEDURE — 99214 OFFICE O/P EST MOD 30 MIN: CPT | Performed by: FAMILY MEDICINE

## 2022-05-25 RX ORDER — BUPROPION HYDROCHLORIDE 300 MG/1
300 TABLET ORAL EVERY MORNING
COMMUNITY
Start: 2022-04-21

## 2022-05-25 RX ORDER — VORTIOXETINE 20 MG/1
TABLET, FILM COATED ORAL
COMMUNITY
Start: 2022-04-21

## 2022-05-25 ASSESSMENT — ENCOUNTER SYMPTOMS
SORE THROAT: 1
CHANGE IN BOWEL HABIT: 0
ABDOMINAL PAIN: 0
COUGH: 1
VOMITING: 0
NAUSEA: 0
SWOLLEN GLANDS: 1

## 2022-05-25 NOTE — PROGRESS NOTES
2022    TELEHEALTH EVALUATION -- Audio/Visual (During FXIQA-79 public health emergency)    Chief Complaint   Patient presents with    Pharyngitis    Headache    Fatigue    Cough    Generalized Body Aches     covid positive 22, symptoms started 22        Tg Melo (:  1965) has requested an audio/video evaluation for the following concern(s):    Chest hurts when she coughs. Is sleeping ok and the cough     Pharyngitis  This is a new problem. The current episode started in the past 7 days (symptoms started on - tested positive for COVID on ). The problem has been gradually worsening. Associated symptoms include chills, coughing, fatigue, a fever (only had on Monday), headaches, myalgias, a sore throat (still very sore throat) and swollen glands. Pertinent negatives include no abdominal pain, change in bowel habit, chest pain, nausea or vomiting. The symptoms are aggravated by coughing. She has tried acetaminophen and NSAIDs (Mucinex ) for the symptoms. The treatment provided mild relief. Headache   Associated symptoms include coughing, a fever (only had on Monday), a sore throat (still very sore throat) and swollen glands. Pertinent negatives include no abdominal pain, nausea or vomiting. Fatigue  Associated symptoms include chills, coughing, fatigue, a fever (only had on Monday), headaches, myalgias, a sore throat (still very sore throat) and swollen glands. Pertinent negatives include no abdominal pain, change in bowel habit, chest pain, nausea or vomiting. Cough  Associated symptoms include chills, a fever (only had on Monday), headaches, myalgias and a sore throat (still very sore throat). Pertinent negatives include no chest pain. Generalized Body Aches  Associated symptoms include chills, coughing, fatigue, a fever (only had on Monday), headaches, myalgias, a sore throat (still very sore throat) and swollen glands.  Pertinent negatives include no abdominal pain, change in bowel habit, chest pain, nausea or vomiting. Review of Systems   Constitutional: Positive for chills, fatigue and fever (only had on Monday). HENT: Positive for sore throat (still very sore throat). Respiratory: Positive for cough. Cardiovascular: Negative for chest pain. Gastrointestinal: Negative for abdominal pain, change in bowel habit, nausea and vomiting. Musculoskeletal: Positive for myalgias. Neurological: Positive for headaches. Prior to Visit Medications    Medication Sig Taking? Authorizing Provider   buPROPion (WELLBUTRIN XL) 300 MG extended release tablet TAKE 1 TABLET BY MOUTH IN THE MORNING Yes Historical Provider, MD   TRINTELLIX 20 MG TABS tablet TAKE 1 TABLET BY MOUTH IN THE MORNING Yes Historical Provider, MD   nirmatrelvir/ritonavir (PAXLOVID) 20 x 150 MG & 10 x 100MG Take 3 tablets (two 150 mg nirmatrelvir and one 100 mg ritonavir tablets) by mouth every 12 hours for 5 days. Yes Fanta Bateman MD   glucose blood VI test strips (ASCENSIA AUTODISC VI;ONE TOUCH ULTRA TEST VI) strip 1 each by In Vitro route daily As needed.  Yes Fanta Bateman MD   Lancets MISC Check sugars daily Yes Fanta Bateman MD       Social History     Tobacco Use    Smoking status: Never Smoker    Smokeless tobacco: Never Used   Substance Use Topics    Alcohol use: No    Drug use: Not on file        No Known Allergies,   Past Medical History:   Diagnosis Date    History of bulimia     in 20 and 30's    Hyperlipidemia     Hypertension     Type 2 diabetes mellitus without complication, without long-term current use of insulin ([facility] 7/29/2018   ,   Past Surgical History:   Procedure Laterality Date    ANKLE SURGERY      scope    CERVICAL POLYP REMOVAL  01/25/2017    CHOLECYSTECTOMY      DILATION AND CURETTAGE OF UTERUS  01/2017    due to menorrhagia    TUBAL LIGATION     ,   Social History     Tobacco Use    Smoking status: Never Smoker    Smokeless tobacco: Never Used Substance Use Topics    Alcohol use: No    Drug use: Not on file   ,   Family History   Problem Relation Age of Onset    Arthritis Mother     Cancer Mother         endometrial   ,   Immunization History   Administered Date(s) Administered    HARVEYID-19, Andrew Hanks, Primary or Immunocompromised, PF, 100mcg/0.5mL 03/16/2021, 04/13/2021, 11/18/2021    Influenza Vaccine, unspecified formulation 11/12/2014, 11/04/2015, 11/10/2016    Influenza Virus Vaccine 09/27/2020    Influenza Whole 10/06/2009    Influenza, MDCK Quadv, IM, PF (Flucelvax 2 yrs and older) 09/23/2021    Influenza, Tiara Broody, IM, PF (6 mo and older Fluzone, Flulaval, Fluarix, and 3 yrs and older Afluria) 11/10/2017, 10/25/2018, 11/11/2019    Influenza, Tiara Broody, Recombinant, IM PF (Flublok 18 yrs and older) 09/27/2020    Pneumococcal Polysaccharide ([id number]) 11/12/2018    Tdap (Boostrix, Adacel) 11/04/2021    Zoster Recombinant (Shingrix) 05/09/2019, 07/18/2019   ,   Health Maintenance   Topic Date Due    HIV screen  Never done    Hepatitis B vaccine (1 of 3 - Risk 3-dose series) Never done    Colorectal Cancer Screen  Never done    Pneumococcal 0-64 years Vaccine (2 - PCV) 11/12/2019    Breast cancer screen  08/20/2021    Diabetic microalbuminuria test  02/18/2022    Cervical cancer screen  01/25/2023 (Originally 1/25/2020)    Diabetic retinal exam  07/12/2022    Lipids  01/20/2023    Diabetic foot exam  03/01/2023    A1C test (Diabetic or Prediabetic)  03/01/2023    Depression Monitoring  03/04/2023    DTaP/Tdap/Td vaccine (2 - Td or Tdap) 11/04/2031    Flu vaccine  Completed    Shingles vaccine  Completed    COVID-19 Vaccine  Completed    Hepatitis C screen  Completed    Hepatitis A vaccine  Aged Out    Hib vaccine  Aged Out    Meningococcal (ACWY) vaccine  Aged Out       PHYSICAL EXAMINATION:  [ INSTRUCTIONS:  \"[x]\" Indicates a positive item  \"[]\" Indicates a negative item  -- DELETE ALL ITEMS NOT EXAMINED]  Vital Signs: (As obtained by patient/caregiver or practitioner observation)  No flowsheet data found. Constitutional: [x] Appears well-developed and well-nourished [x] No apparent distress      [] Abnormal-   Mental status  [x] Alert and awake  [x] Oriented to person/place/time [x]Able to follow commands      Eyes:  EOM    [x]  Normal  [] Abnormal-  Sclera  [x]  Normal  [] Abnormal -         Discharge [x]  None visible  [] Abnormal -    HENT:   [x] Normocephalic, atraumatic. [] Abnormal   [x] Mouth/Throat: Mucous membranes are moist.     External Ears [x] Normal  [] Abnormal-     Neck: [x] No visualized mass     Pulmonary/Chest: [x] Respiratory effort normal.  [x] No visualized signs of difficulty breathing or respiratory distress        [] Abnormal-      Musculoskeletal:   [] Normal gait with no signs of ataxia         [x] Normal range of motion of neck        [] Abnormal-       Neurological:        [x] No Facial Asymmetry (Cranial nerve 7 motor function) (limited exam to video visit)          [x] No gaze palsy        [] Abnormal-         Skin:        [x] No significant exanthematous lesions or discoloration noted on facial skin         [] Abnormal-            Psychiatric:       [x] Normal Affect [x] No Hallucinations        [] Abnormal-     Other pertinent observable physical exam findings-     Due to this being a TeleHealth encounter, evaluation of the following organ systems is limited: Vitals/Constitutional/EENT/Resp/CV/GI//MS/Neuro/Skin/Heme-Lymph-Imm. ASSESSMENT/PLAN:  1. COVID-19  Quarantine for 5 days at a minimum after the positive test and until you are fever free and the symptoms are much improved. Supportive care with increased fluids, OTC medications for fever, congestion as needed for symptom control. Limit contact with others as much as possible. Ensure ambulating every hour while awake to prevent blood clot formation. I would also recommend a baby aspirin to help prevent blood clots for 3-4 weeks after a COVID infection as well. Deep breathing 10 times every hour also reviewed. Please call the office or call the ER if increasing symptoms or distress. Dr. Timmy Jerez      2. Primary hypertension      3. Type 2 diabetes mellitus without complication, without long-term current use of insulin (HCC)        Return if symptoms worsen or fail to improve. An  electronic signature was used to authenticate this note. Waqas Kimbrough, was evaluated through a synchronous (real-time) audio-video encounter. The patient (or guardian if applicable) is aware that this is a billable service, which includes applicable co-pays. This Virtual Visit was conducted with patient's (and/or legal guardian's) consent. The visit was conducted pursuant to the emergency declaration under the [address] [place] [address] authority and the [employer] General Act. Patient identification was verified, and a caregiver was present when appropriate. The patient was located at Home: [facility]. Provider was located at [facility] (Appt Dept): [address]  [address] [place] [address].        --Ronni Shay MD on 5/25/2022 at 1:44 PM    Total time spent on this encounter: Not billed by time

## 2022-07-27 NOTE — PROGRESS NOTES
states she will pick one food and stick with it for a while currently she is eating lots of eggs. Wilmer Boykin weight is trending down blood sugars are stable no hypoglycemia patient has started cardio drumming 1-2 times per week     7/7/2020  She is here for follow up dm. She is not counting calories anymore but eat relatively the same foods everyday, protein shakes and fish/chicken with veggies. She still struggles with thinking about food and weight loss all the time. She sees a counselor and they are working through these thoughts.      10/06/20   She is watching her food intake. Not documenting our counting just eats the same thing everyday. She did go on vacation and ate more than normal. She still follows with counseling for history of eating disorder. Diet:2 shakes per day, sensible dinner. Exercise: Daily, drumming, walking, dance class. BS testing: not testing   Issues: denies     04/06/21   At previous visit dm counseling was provided. She is not testing blood sugars she is following with counceling for eating disorder. She has not increased calorie intake as we discussed she is fearful she will lose control and binge. She also states when she is exercising she will sometimes feel hypoglycemic she will stop and have a piece of candy. She has not test during this time. Diet:  1 shake per day, not documenting food intake but she eats the same every day, egg/yougurt for breakfast or nothing, shake for lunch, salad for dinner. No snacks. 700 calories or less per day. Exercise: Daily, drumming, walking, dance class. BS testing: none  Issues: denies     High cholesterol-  Takes lipitor and denies any adverse effects with its use.  Watches diet and exercise.      Hypertension-  Takes lisinopril and denies any adverse effects with their use. Watches diet and exercise.  Denies any chest pain,or edema.  Follows with cardiology:No.     Obesity- Working on weight loss.  Her stated ideal weight is 135 lbs  She does Positive for tremors. Negative for dizziness, seizures, weakness and headaches. Psychiatric/Behavioral: Negative. Negative for confusion and decreased concentration. Theremainder of a complete 14-point review of systems is negative. Vital Signs: /82 (Site: Left Upper Arm, Position: Sitting, Cuff Size: Medium Adult)   Pulse 60   Resp 14   Ht 5' 5\" (1.651 m)   Wt 194 lb (88 kg)   LMP  (LMP Unknown) Comment: more than a couple years  BMI 32.28 kg/m²      Wt Readings from Last 3 Encounters:   04/06/21 194 lb (88 kg)   02/17/21 193 lb (87.5 kg)   10/06/20 181 lb (82.1 kg)     Body mass index is 32.28 kg/m².   LABS:  Hemoglobin A1C   Date Value Ref Range Status   02/17/2021 5.5 % Final   07/07/2020 5.6 % Final     Lab Results   Component Value Date    LABMICR CANNOT BE CALCULATED 02/18/2021     Lab Results   Component Value Date     02/18/2021    K 4.2 02/18/2021     02/18/2021    CO2 30 02/18/2021    BUN 12 02/18/2021    CREATININE 0.61 02/18/2021    GLUCOSE 118 (H) 02/18/2021    CALCIUM 9.6 02/18/2021    PROT 7.2 02/18/2021    LABALBU 4.4 02/18/2021    BILITOT 0.75 02/18/2021    ALKPHOS 49 02/18/2021    AST 24 02/18/2021    ALT 26 02/18/2021    LABGLOM >60 02/18/2021    GFRAA >60 02/18/2021    AGRATIO 1.3 03/04/2018    GLOB 2.9 03/04/2018     Lab Results   Component Value Date    CHOL 177 02/18/2021    CHOL 185 02/03/2020    CHOL 225 (H) 04/01/2019     Lab Results   Component Value Date    TRIG 87 02/18/2021    TRIG 68 02/03/2020    TRIG 104 04/01/2019     Lab Results   Component Value Date    HDL 78 02/18/2021    HDL 73 (H) 02/03/2020    HDL 53 12/01/2016     Lab Results   Component Value Date    LDLCHOLESTEROL 82 02/18/2021    LDLCALC 98.4 02/03/2020    LDLCALC 150.2 04/01/2019    LDLCALC 164.4 (H) 04/16/2018     Lab Results   Component Value Date    VLDL NOT REPORTED 02/18/2021    VLDL 14 02/03/2020    VLDL 21 04/01/2019     Lab Results   Component Value Date    CHOLHDLRATIO 2.3 02/18/2021    CHOLHDLRATIO 2.53 02/03/2020    CHOLHDLRATIO 4.2 04/01/2019           Physical Exam  Constitutional:       Appearance: She is well-developed. Eyes:      Pupils: Pupils are equal, round, and reactive to light. Neck:      Thyroid: No thyroid mass, thyromegaly or thyroid tenderness. Cardiovascular:      Rate and Rhythm: Normal rate and regular rhythm. Heart sounds: Normal heart sounds. Pulmonary:      Effort: Pulmonary effort is normal.      Breath sounds: Normal breath sounds. Abdominal:      General: Bowel sounds are normal.      Palpations: Abdomen is soft. Skin:     General: Skin is warm and dry. Comments: Negative for open/nonhealing wounds. Negative for lipohypertrophy. Neurological:      Mental Status: She is alert and oriented to person, place, and time. ASSESSMENT/PLAN:     Diagnosis Orders   1. Type 2 diabetes mellitus without complication, without long-term current use of insulin ([facility])  metFORMIN (GLUCOPHAGE-XR) 500 MG extended release tablet   2. Diabetes education, encounter for     3. Mixed hyperlipidemia     4. Essential hypertension     5. BMI 31.0-31.9,adult     6. BMI 30.0-30.9,adult       No orders of the defined types were placed in this encounter. Orders Placed This Encounter   Medications    metFORMIN (GLUCOPHAGE-XR) 500 MG extended release tablet     Sig: Take 1 tablet by mouth daily (with breakfast)     Dispense:  180 tablet     Refill:  3     Requested Prescriptions     Signed Prescriptions Disp Refills    metFORMIN (GLUCOPHAGE-XR) 500 MG extended release tablet 180 tablet 3     Sig: Take 1 tablet by mouth daily (with breakfast)       1. Type 2 diabetes mellitus without complication, without long-term current use of insulin ([facility]  2. Diabetes education, encounter for    - metFORMIN (GLUCOPHAGE-XR) 500 MG extended release tablet; Take 1 tablet by mouth daily (with breakfast)  Dispense: 180 tablet;  Refill: 3    - Stable  HbA1C goal is less than 7%. - Fasting blood glucose goal is 70-130mg/dl and postprandial blood sugar goal is less than 180 mg/dl. -Diabetic foot exam up-to-date: Yes  -Diabetic retinal exam up-to-date: Yes  - Labs reviewed includes: Most recent A1C 5.5%, Microalb/Crt. Ratio 0 mcg/mg creat and GFR >60 mL/min. Repeat labs due in 2 months.    -We discussed in great detail dietary modifications they can make to better improve their blood sugars. -follow up diabetes education completed, all questions answered. Patient Instructions   Try low calorie one day and then the next day increase calorie intake to 1200 calories using whole foods. Create a meal plan. Decrease metformin to one tab in the am     Check blood sugar when you are feeling low  Goal is         Discussed signs and symptoms of hyper/hypoglycemia and how to treat. Encouraged 150 minutes of physical activity per week. Follow a low carbohydrate diet. Encouraged at least 7 hours of sleep. The patient was informed of the goals of diabetes management. This can only be accomplished by watching their diet and exercise levels. We certainly use medicines to help attain these goals. The consequences of not controlling blood sugars were discussed. These include blindness, heart disease, stroke, kidney disease, and possibly need for dialysis. They were told to be careful with their foot care as diabetics often have nerve damage, infections and risk for limb amutations . They also need a dilated eye exam yearly. We discussed the issues of diet, exercise, medication, complication avoidance, reviewed the signs and symptoms of diabetes, hypoglycemic episodes, significance of HbA1C.         3. Mixed hyperlipidemia  stable, lipid panel reviewed, continue current medications. Diet and exercise      4. Essential hypertension   stable, continue current medications. Diet and exercise Seek emergent care if chest pain develops.        5. BMI 31.0-31.9,adult  Reduce calories and increase physical activity to achieve a slow and steady weight loss to improve blood pressure, cholesterol and diabetes. -weight is trending up. Answered all patient questions. Agrees to follow plan of care and to follow up in 2 months, sooner if needed. Call office if unexplained blood sugars less than 70 occur or above 400. Call office or access MyChart with any further questions or concerns. Be sure to bring glucometer/food log at next appointment. Total time spent reviewing chart, labs, counseling patient and documenting on the date of the encounter: 30 min.       Electronically signed by IRENE Bradshaw CNP on 4/6/2021 at 9:42 AM      (Please note that portions of this note were completed with a voice-recognition program. Efforts were made to edit the dictation but occasionally words are mis-transcribed.) (M6) obeys commands

## 2022-07-28 ENCOUNTER — OFFICE VISIT (OUTPATIENT)
Dept: FAMILY MEDICINE CLINIC | Age: 57
End: 2022-07-28
Payer: COMMERCIAL

## 2022-07-28 VITALS
SYSTOLIC BLOOD PRESSURE: 126 MMHG | BODY MASS INDEX: 36.05 KG/M2 | WEIGHT: 212 LBS | OXYGEN SATURATION: 98 % | DIASTOLIC BLOOD PRESSURE: 74 MMHG | HEART RATE: 75 BPM

## 2022-07-28 DIAGNOSIS — F41.8 DEPRESSION WITH ANXIETY: ICD-10-CM

## 2022-07-28 DIAGNOSIS — E11.9 TYPE 2 DIABETES MELLITUS WITHOUT COMPLICATION, WITHOUT LONG-TERM CURRENT USE OF INSULIN (HCC): Primary | ICD-10-CM

## 2022-07-28 DIAGNOSIS — Z12.31 VISIT FOR SCREENING MAMMOGRAM: ICD-10-CM

## 2022-07-28 DIAGNOSIS — I10 PRIMARY HYPERTENSION: ICD-10-CM

## 2022-07-28 DIAGNOSIS — M72.2 PLANTAR FASCIITIS: ICD-10-CM

## 2022-07-28 DIAGNOSIS — Z23 NEED FOR PROPHYLACTIC VACCINATION AGAINST STREPTOCOCCUS PNEUMONIAE (PNEUMOCOCCUS): ICD-10-CM

## 2022-07-28 LAB
HBA1C MFR BLD: 5.8 %
VITAMIN B-12: 891 PG/ML (ref 239–931)
VITAMIN D 25-HYDROXY: 50.6 NG/ML (ref 30–100)

## 2022-07-28 PROCEDURE — 83036 HEMOGLOBIN GLYCOSYLATED A1C: CPT | Performed by: FAMILY MEDICINE

## 2022-07-28 PROCEDURE — 3044F HG A1C LEVEL LT 7.0%: CPT | Performed by: FAMILY MEDICINE

## 2022-07-28 PROCEDURE — 99214 OFFICE O/P EST MOD 30 MIN: CPT | Performed by: FAMILY MEDICINE

## 2022-07-28 PROCEDURE — 90677 PCV20 VACCINE IM: CPT | Performed by: FAMILY MEDICINE

## 2022-07-28 RX ORDER — HYDROXYZINE 50 MG/1
50 TABLET, FILM COATED ORAL NIGHTLY PRN
COMMUNITY
Start: 2022-07-20

## 2022-07-28 RX ORDER — BUSPIRONE HYDROCHLORIDE 10 MG/1
10 TABLET ORAL 3 TIMES DAILY
COMMUNITY
Start: 2022-07-21

## 2022-07-28 RX ORDER — BUPROPION HYDROCHLORIDE 150 MG/1
150 TABLET ORAL DAILY
COMMUNITY
Start: 2022-07-20

## 2022-07-28 NOTE — PROGRESS NOTES
[facility]  [address]  Dept: [phone]  Dept GQT:[phone]    Aman Harley is a 61 y.o. female who presents today for her medical conditions/complaints as notedbelow. Aman Harley is c/o of Diabetes (3mo follow up)        Assessment/Plan:     1. Type 2 diabetes mellitus without complication, without long-term current use of insulin (HCC)  -     POCT glycosylated hemoglobin (Hb A1C)  2. Primary hypertension  3. Depression with anxiety  4. Plantar fasciitis  5. Visit for screening mammogram  -     [facility] LOLY DIGITAL SCREEN BILATERAL; Future  6. Need for prophylactic vaccination against Streptococcus pneumoniae (pneumococcus)  -     Pneumococcal, PCV20, PREVNAR 21, (age 25 yrs+), IM, PF    Encouraged to try to focus on healthier eating pattern. Hopefully this will improve with the increased dose of the Wellbutrin and the continued work on the mood symptoms. Hemoglobin A1c is well controlled at this time. Hypertension is controlled with her life style modifications. Monitor this with the increased dose of the Wellbutrin. Labs for screening with the lipids in the next 6 months. Home exercise program ice and supportive footwear for the plantar fasciitis. May use over-the-counter NSAIDs but watch for GI upset with this. Labs for med monitoring with the B12 and vitamin D today.     Lab Results   Component Value Date    WBC 4.6 (L) 01/19/2022    HGB 13.6 01/19/2022    HCT 40.2 01/19/2022    .9 01/19/2022    CHOL 223 (H) 01/07/2022    TRIG 123 01/07/2022    HDL 98 (H) 01/07/2022    ALT 43 (H) 01/19/2022    AST 33 01/19/2022     01/19/2022    K 3.9 01/19/2022     01/19/2022    CREATININE 0.7 01/19/2022    BUN 19 (H) 01/19/2022    CO2 30 01/19/2022    TSH 0.90 12/03/2021    LABA1C 5.8 07/28/2022    LABA1C 5.5 03/01/2022    LABA1C 5.7 12/07/2021    LABMICR CANNOT BE CALCULATED 02/18/2021       Return in about 6 months (around 1/28/2023). Subjective:      HPI:     HPI    Vicente Farmer is a 61 y.o. female who presents for follow-up of hypertension, diabetes, and hyperlipidemia. She indicates that she is feeling well and denies any symptoms referable to her elevated blood pressure or diabetes. Specifically denies chest pain, palpitations, dyspnea, peripheral edema, thirst, frequent urination, and blurred vision. Current medication regimen is as listed below. She denies any side effects of medication, and has been taking it regularly. Last eye exam was7/18/22 withEliceo. Diabetic complications includenone. shehas been exercising regularly 3-4 times per week. Has notbeen following a diabetic diet. Is still struggling with the depression, some thoughts of hurting herself-- is working with the psychiatrist regularly and just increased her medications. Feels Ok today. Has increased stress with caring for her father with health problems. Also has a counselor she is working with and has a follow up set up with both in the next few weeks. Does thin this is helpful. Has pain in the right heel when she first stands up from a chair especially in the morning. Is better after the first few steps. Does not really tried anything. She does not usually wear supportive shoes except when she is exercising. Not tried any medications or exercises for this specifically.     BP Readings from Last 3 Encounters:   07/28/22 126/74   03/04/22 120/70   03/01/22 98/62          (goal 120/80)    Wt Readings from Last 3 Encounters:   07/28/22 212 lb (96.2 kg)   03/04/22 202 lb 1.6 oz (91.7 kg)   03/01/22 197 lb (89.4 kg)        Past Medical History:   Diagnosis Date    History of bulimia     in 19 and 30's    Hyperlipidemia     Hypertension     Type 2 diabetes mellitus without complication, without long-term current use of insulin ([facility] [address] 7/29/2018      Past Surgical History:   Procedure Laterality Date    ANKLE SURGERY      scope CERVICAL POLYP REMOVAL  01/25/2017    CHOLECYSTECTOMY      DILATION AND CURETTAGE OF UTERUS  01/2017    due to menorrhagia    TUBAL LIGATION         Family History   Problem Relation Age of Onset    Arthritis Mother     Cancer Mother         endometrial       Social History     Tobacco Use    Smoking status: Never    Smokeless tobacco: Never   Substance Use Topics    Alcohol use: No      Current Outpatient Medications   Medication Sig Dispense Refill    buPROPion (WELLBUTRIN XL) 300 MG extended release tablet Take 300 mg by mouth every morning With 150 mg tab      TRINTELLIX 20 MG TABS tablet TAKE 1 TABLET BY MOUTH IN THE MORNING      buPROPion (WELLBUTRIN XL) 150 MG extended release tablet Take 150 mg by mouth in the morning. With 300 mg tab. busPIRone (BUSPAR) 10 MG tablet Take 10 mg by mouth in the morning, at noon, and at bedtime      hydrOXYzine HCl (ATARAX) 50 MG tablet Take 50 mg by mouth nightly as needed      glucose blood VI test strips (ASCENSIA AUTODISC VI;ONE TOUCH ULTRA TEST VI) strip 1 each by In Vitro route daily As needed. 100 each 3    Lancets MISC Check sugars daily 100 each 3     No current facility-administered medications for this visit.      No Known Allergies    Health Maintenance   Topic Date Due    HIV screen  Never done    Hepatitis B vaccine (1 of 3 - Risk 3-dose series) Never done    Colorectal Cancer Screen  Never done    Breast cancer screen  08/20/2021    Diabetic microalbuminuria test  02/18/2022    COVID-19 Vaccine (4 - Booster for Moderna series) 03/18/2022    Cervical cancer screen  01/25/2023 (Originally 1/25/2020)    Flu vaccine (1) 09/01/2022    Lipids  01/20/2023    Diabetic foot exam  03/01/2023    Depression Monitoring  03/04/2023    Diabetic retinal exam  07/18/2023    A1C test (Diabetic or Prediabetic)  07/28/2023    DTaP/Tdap/Td vaccine (2 - Td or Tdap) 11/04/2031    Shingles vaccine  Completed    Pneumococcal 0-64 years Vaccine  Completed    Hepatitis C screen Completed    Hepatitis A vaccine  Aged Out    Hib vaccine  Aged Out    Meningococcal (ACWY) vaccine  Aged Out         Review of Systems    Objective:     /74 (Site: Left Upper Arm, Position: Sitting, Cuff Size: Large Adult)   Pulse 75   Wt 212 lb (96.2 kg)   LMP  (LMP Unknown) Comment: more than a couple years  SpO2 98%   BMI 36.05 kg/m²     Physical Exam  Vitals and nursing note reviewed. Constitutional:       Appearance: Normal appearance. She is well-developed. HENT:      Head: Normocephalic and atraumatic. Right Ear: External ear normal.      Left Ear: External ear normal.   Eyes:      Extraocular Movements: Extraocular movements intact. Conjunctiva/sclera: Conjunctivae normal.   Neck:      Thyroid: No thyromegaly. Vascular: No JVD. Cardiovascular:      Rate and Rhythm: Normal rate and regular rhythm. Pulses: Normal pulses. Heart sounds: Normal heart sounds. No murmur heard. No friction rub. No gallop. Pulmonary:      Effort: Pulmonary effort is normal. No respiratory distress. Breath sounds: Normal breath sounds. Musculoskeletal:      Cervical back: Normal range of motion and neck supple. Right lower leg: No edema. Left lower leg: No edema. Feet:    Lymphadenopathy:      Cervical: No cervical adenopathy. Skin:     General: Skin is warm. Capillary Refill: Capillary refill takes less than 2 seconds. Neurological:      General: No focal deficit present. Mental Status: She is alert and oriented to person, place, and time. Psychiatric:         Mood and Affect: Mood normal.         Behavior: Behavior normal.         Thought Content: Thought content normal.         Judgment: Judgment normal.             Multiple labs and other testing may have been ordered which may not be completely evident from the above note due to system interface incompatibilities. Patient given educational materials - see patientinstructions.   Discussed use, benefit, and side effects of prescribed medications. All patient questions answered. Pt voiced understanding. Reviewed health maintenance. Instructed to continue current medications, diet andexercise. Patient agreed with treatment plan. Follow up as directed.      (Please note that portions of this note were completed with a voice-recognition program. Efforts were made to edit the dictation but occasionally words are mis-transcribed.)    Electronically signed by Anthony Vinson MD on 7/28/2022

## 2023-01-12 ENCOUNTER — OFFICE VISIT (OUTPATIENT)
Dept: FAMILY MEDICINE CLINIC | Age: 58
End: 2023-01-12
Payer: COMMERCIAL

## 2023-01-12 VITALS — OXYGEN SATURATION: 98 % | DIASTOLIC BLOOD PRESSURE: 80 MMHG | HEART RATE: 105 BPM | SYSTOLIC BLOOD PRESSURE: 138 MMHG

## 2023-01-12 DIAGNOSIS — L03.113 CELLULITIS OF RIGHT ARM: ICD-10-CM

## 2023-01-12 DIAGNOSIS — S41.111A ARM LACERATION, RIGHT, INITIAL ENCOUNTER: Primary | ICD-10-CM

## 2023-01-12 PROCEDURE — 3079F DIAST BP 80-89 MM HG: CPT | Performed by: FAMILY MEDICINE

## 2023-01-12 PROCEDURE — 99213 OFFICE O/P EST LOW 20 MIN: CPT | Performed by: FAMILY MEDICINE

## 2023-01-12 PROCEDURE — 3075F SYST BP GE 130 - 139MM HG: CPT | Performed by: FAMILY MEDICINE

## 2023-01-12 RX ORDER — BUSPIRONE HYDROCHLORIDE 15 MG/1
15 TABLET ORAL 3 TIMES DAILY
COMMUNITY
Start: 2023-01-05

## 2023-01-12 RX ORDER — CEPHALEXIN 500 MG/1
1000 CAPSULE ORAL 2 TIMES DAILY
Qty: 20 CAPSULE | Refills: 0 | Status: SHIPPED | OUTPATIENT
Start: 2023-01-12 | End: 2023-01-17

## 2023-01-12 RX ORDER — ARIPIPRAZOLE 5 MG/1
5 TABLET ORAL DAILY
COMMUNITY

## 2023-01-12 ASSESSMENT — PATIENT HEALTH QUESTIONNAIRE - PHQ9
SUM OF ALL RESPONSES TO PHQ QUESTIONS 1-9: 6
8. MOVING OR SPEAKING SO SLOWLY THAT OTHER PEOPLE COULD HAVE NOTICED. OR THE OPPOSITE, BEING SO FIGETY OR RESTLESS THAT YOU HAVE BEEN MOVING AROUND A LOT MORE THAN USUAL: 0
SUM OF ALL RESPONSES TO PHQ QUESTIONS 1-9: 6
6. FEELING BAD ABOUT YOURSELF - OR THAT YOU ARE A FAILURE OR HAVE LET YOURSELF OR YOUR FAMILY DOWN: 1
SUM OF ALL RESPONSES TO PHQ QUESTIONS 1-9: 6
2. FEELING DOWN, DEPRESSED OR HOPELESS: 1
SUM OF ALL RESPONSES TO PHQ9 QUESTIONS 1 & 2: 1
7. TROUBLE CONCENTRATING ON THINGS, SUCH AS READING THE NEWSPAPER OR WATCHING TELEVISION: 1
9. THOUGHTS THAT YOU WOULD BE BETTER OFF DEAD, OR OF HURTING YOURSELF: 0
4. FEELING TIRED OR HAVING LITTLE ENERGY: 1
5. POOR APPETITE OR OVEREATING: 1
SUM OF ALL RESPONSES TO PHQ QUESTIONS 1-9: 6
3. TROUBLE FALLING OR STAYING ASLEEP: 1
1. LITTLE INTEREST OR PLEASURE IN DOING THINGS: 0
10. IF YOU CHECKED OFF ANY PROBLEMS, HOW DIFFICULT HAVE THESE PROBLEMS MADE IT FOR YOU TO DO YOUR WORK, TAKE CARE OF THINGS AT HOME, OR GET ALONG WITH OTHER PEOPLE: 1

## 2023-01-12 NOTE — PROGRESS NOTES
[address] [facility]  [address]  Dept: [phone]  Dept [facility]:[phone]    Korin Del Cid is a 61 y.o. female who presents today for her medical conditions/complaints as notedbelow. Korin Del Cid is c/o of Laceration (Cut right forearm)        Assessment/Plan:     1. Arm laceration, right, initial encounter  -     cephALEXin (KEFLEX) 500 MG capsule; Take 2 capsules by mouth 2 times daily for 5 days, Disp-20 capsule, R-0Normal  2. Cellulitis of right arm  -     cephALEXin (KEFLEX) 500 MG capsule; Take 2 capsules by mouth 2 times daily for 5 days, Disp-20 capsule, R-0Normal    Keep covered. Avoid bandaids and antibiotic oinment at this time. Watch for increasing infection. Continue to work with the psychiatrist and counselor on the self harm behaviors. Lab Results   Component Value Date    WBC 4.6 (L) 01/19/2022    HGB 13.6 01/19/2022    HCT 40.2 01/19/2022    .9 01/19/2022    CHOL 223 (H) 01/07/2022    TRIG 123 01/07/2022    HDL 98 (H) 01/07/2022    ALT 43 (H) 01/19/2022    AST 33 01/19/2022     01/19/2022    K 3.9 01/19/2022     01/19/2022    CREATININE 0.7 01/19/2022    BUN 19 (H) 01/19/2022    CO2 30 01/19/2022    TSH 0.90 12/03/2021    LABA1C 5.8 07/28/2022    LABA1C 5.5 03/01/2022    LABA1C 5.7 12/07/2021    LABMICR CANNOT BE CALCULATED 02/18/2021       Return for As scheduled. Subjective:      HPI:     HPI    Has struggled with self harm-- this is most recent episode. Continuing to work with a counselor and her psychiatrist-- is really working on it and trying to stop. This is from a razor blade.        BP Readings from Last 3 Encounters:   01/12/23 138/80   07/28/22 126/74   03/04/22 120/70          (goal 120/80)    Wt Readings from Last 3 Encounters:   07/28/22 212 lb (96.2 kg)   03/04/22 202 lb 1.6 oz (91.7 kg)   03/01/22 197 lb (89.4 kg)        Past Medical History:   Diagnosis Date    History of bulimia in 20 and 30's    Hyperlipidemia     Hypertension     Type 2 diabetes mellitus without complication, without long-term current use [facility] 7/29/2018      Past Surgical History:   Procedure Laterality Date    ANKLE SURGERY      scope    CERVICAL POLYP REMOVAL  01/25/2017    CHOLECYSTECTOMY      DILATION AND CURETTAGE OF UTERUS  01/2017    due to menorrhagia    TUBAL LIGATION         Family History   Problem Relation Age of Onset    Arthritis Mother     Cancer Mother         endometrial       Social History     Tobacco Use    Smoking status: Never    Smokeless tobacco: Never   Substance Use Topics    Alcohol use: No      Current Outpatient Medications   Medication Sig Dispense Refill    ARIPiprazole (ABILIFY) 5 MG tablet Take 5 mg by mouth daily      busPIRone (BUSPAR) 15 MG tablet Take 15 mg by mouth in the morning, at noon, and at bedtime      cephALEXin (KEFLEX) 500 MG capsule Take 2 capsules by mouth 2 times daily for 5 days 20 capsule 0    buPROPion (WELLBUTRIN XL) 150 MG extended release tablet Take 150 mg by mouth in the morning. With 300 mg tab. buPROPion (WELLBUTRIN XL) 300 MG extended release tablet Take 300 mg by mouth every morning With 150 mg tab      TRINTELLIX 20 MG TABS tablet TAKE 1 TABLET BY MOUTH IN THE MORNING      glucose blood VI test strips (ASCENSIA AUTODISC VI;ONE TOUCH ULTRA TEST VI) strip 1 each by In Vitro route daily As needed. 100 each 3    Lancets MISC Check sugars daily 100 each 3     No current facility-administered medications for this visit.      No Known Allergies    Health Maintenance   Topic Date Due    HIV screen  Never done    Hepatitis B vaccine (1 of 3 - Risk 3-dose series) Never done    Colorectal Cancer Screen  Never done    Breast cancer screen  08/20/2021    COVID-19 Vaccine (4 - Booster for Moderna series) 01/13/2022    Diabetic Alb to Cr ratio (uACR) test  02/18/2022    Flu vaccine (1) 08/01/2022    GFR test (Diabetes, CKD 3-4, OR last GFR 15-59) 01/19/2023    Lipids  01/20/2023    Cervical cancer screen  01/25/2023 (Originally 1/25/2020)    Diabetic foot exam  03/01/2023    Depression Monitoring  03/04/2023    Diabetic retinal exam  07/18/2023    A1C test (Diabetic or Prediabetic)  07/28/2023    DTaP/Tdap/Td vaccine (2 - Td or Tdap) 11/04/2031    Shingles vaccine  Completed    Pneumococcal 0-64 years Vaccine  Completed    Hepatitis C screen  Completed    Hepatitis A vaccine  Aged Out    Hib vaccine  Aged Out    Meningococcal (ACWY) vaccine  Aged Out         Review of Systems    Objective:     /80 (Site: Left Upper Arm, Position: Sitting, Cuff Size: Large Adult)   Pulse (!) 105   LMP  (LMP Unknown) Comment: more than a couple years  SpO2 98%     Physical Exam  Vitals and nursing note reviewed. Constitutional:       Appearance: Normal appearance. Musculoskeletal:        Arms:       Comments: 3 6 cm linear superficial laceration parellel as indicated on the inner right forearm with surrounding ring of warm, tenderness and erythema. Bumpy edge along bandaid area also. Neurological:      Mental Status: She is alert. Multiple labs and other testing may have been ordered which may not be completely evident from the above note due to system interface incompatibilities. Patient given educational materials - see patientinstructions. Discussed use, benefit, and side effects of prescribed medications. All patient questions answered. Pt voiced understanding. Reviewed health maintenance. Instructed to continue current medications, diet andexercise. Patient agreed with treatment plan. Follow up as directed.      (Please note that portions of this note were completed with a voice-recognition program. Efforts were made to edit the dictation but occasionally words are mis-transcribed.)    Electronically signed by Renetta Brooks MD on 1/12/2023

## 2023-01-24 ENCOUNTER — TELEPHONE (OUTPATIENT)
Dept: PAIN MEDICINE | Facility: CLINIC | Age: 58
End: 2023-01-24

## 2023-01-27 RX ORDER — LEVOTHYROXINE SODIUM 0.12 MG/1
TABLET ORAL
COMMUNITY
Start: 2011-05-05

## 2023-01-27 RX ORDER — PENICILLIN V POTASSIUM 500 MG/1
TABLET ORAL
COMMUNITY
Start: 2011-05-18

## 2023-01-27 RX ORDER — TRAMADOL HYDROCHLORIDE 50 MG/1
50 TABLET ORAL
COMMUNITY
Start: 2011-03-04

## 2023-01-27 RX ORDER — AMOXICILLIN 875 MG/1
TABLET, COATED ORAL
COMMUNITY
Start: 2011-03-10

## 2023-01-27 RX ORDER — FLUTICASONE PROPIONATE 50 MCG
SPRAY, SUSPENSION (ML) NASAL
COMMUNITY
Start: 2011-04-25

## 2023-01-27 RX ORDER — FLUOXETINE HYDROCHLORIDE 40 MG/1
CAPSULE ORAL
COMMUNITY

## 2023-01-27 RX ORDER — AMOXICILLIN AND CLAVULANATE POTASSIUM 875; 125 MG/1; MG/1
TABLET, FILM COATED ORAL
COMMUNITY
Start: 2011-04-25

## 2023-01-27 RX ORDER — BUTALBITAL, ACETAMINOPHEN AND CAFFEINE 50; 325; 40 MG/1; MG/1; MG/1
TABLET ORAL
COMMUNITY

## 2023-01-27 RX ORDER — NAPROXEN 500 MG/1
TABLET ORAL
COMMUNITY
Start: 2011-02-07 | End: 2023-01-30

## 2023-01-27 RX ORDER — TRIAMTERENE AND HYDROCHLOROTHIAZIDE 75; 50 MG/1; MG/1
TABLET ORAL
COMMUNITY
Start: 2011-05-05

## 2023-01-27 RX ORDER — ALBUTEROL SULFATE 90 UG/1
AEROSOL, METERED RESPIRATORY (INHALATION)
COMMUNITY

## 2023-01-27 RX ORDER — IBUPROFEN 800 MG/1
TABLET ORAL
COMMUNITY
Start: 2011-02-23 | End: 2023-01-30

## 2023-01-27 RX ORDER — BUTALBITAL, ASPIRIN AND CAFFEINE 50; 325; 40 MG/1; MG/1; MG/1
TABLET ORAL
COMMUNITY
Start: 2011-04-08

## 2023-01-27 RX ORDER — BENZONATATE 100 MG/1
CAPSULE ORAL
COMMUNITY

## 2023-01-27 RX ORDER — POTASSIUM CHLORIDE 20 MEQ/1
TABLET, EXTENDED RELEASE ORAL
COMMUNITY

## 2023-01-30 ENCOUNTER — OFFICE VISIT (OUTPATIENT)
Dept: FAMILY MEDICINE CLINIC | Age: 58
End: 2023-01-30
Payer: COMMERCIAL

## 2023-01-30 ENCOUNTER — CONSULT (OUTPATIENT)
Dept: PAIN MEDICINE | Facility: CLINIC | Age: 58
End: 2023-01-30

## 2023-01-30 VITALS
HEART RATE: 89 BPM | SYSTOLIC BLOOD PRESSURE: 150 MMHG | BODY MASS INDEX: 31.99 KG/M2 | RESPIRATION RATE: 20 BRPM | DIASTOLIC BLOOD PRESSURE: 84 MMHG | TEMPERATURE: 97.9 F | WEIGHT: 192 LBS | HEIGHT: 65 IN

## 2023-01-30 VITALS — DIASTOLIC BLOOD PRESSURE: 86 MMHG | OXYGEN SATURATION: 99 % | SYSTOLIC BLOOD PRESSURE: 136 MMHG | HEART RATE: 72 BPM

## 2023-01-30 DIAGNOSIS — M16.11 PRIMARY OSTEOARTHRITIS OF RIGHT HIP: Primary | ICD-10-CM

## 2023-01-30 DIAGNOSIS — Z12.11 COLON CANCER SCREENING: ICD-10-CM

## 2023-01-30 DIAGNOSIS — E78.2 MIXED HYPERLIPIDEMIA: ICD-10-CM

## 2023-01-30 DIAGNOSIS — I10 PRIMARY HYPERTENSION: ICD-10-CM

## 2023-01-30 DIAGNOSIS — E55.9 VITAMIN D DEFICIENCY: ICD-10-CM

## 2023-01-30 DIAGNOSIS — Z12.31 VISIT FOR SCREENING MAMMOGRAM: ICD-10-CM

## 2023-01-30 DIAGNOSIS — F41.8 DEPRESSION WITH ANXIETY: ICD-10-CM

## 2023-01-30 DIAGNOSIS — E11.9 TYPE 2 DIABETES MELLITUS WITHOUT COMPLICATION, WITHOUT LONG-TERM CURRENT USE OF INSULIN (HCC): Primary | ICD-10-CM

## 2023-01-30 LAB — HBA1C MFR BLD: 6 %

## 2023-01-30 PROCEDURE — 3044F HG A1C LEVEL LT 7.0%: CPT | Performed by: FAMILY MEDICINE

## 2023-01-30 PROCEDURE — 3074F SYST BP LT 130 MM HG: CPT | Performed by: FAMILY MEDICINE

## 2023-01-30 PROCEDURE — 3078F DIAST BP <80 MM HG: CPT | Performed by: FAMILY MEDICINE

## 2023-01-30 PROCEDURE — 83036 HEMOGLOBIN GLYCOSYLATED A1C: CPT | Performed by: FAMILY MEDICINE

## 2023-01-30 PROCEDURE — 90471 IMMUNIZATION ADMIN: CPT | Performed by: FAMILY MEDICINE

## 2023-01-30 PROCEDURE — 90674 CCIIV4 VAC NO PRSV 0.5 ML IM: CPT | Performed by: FAMILY MEDICINE

## 2023-01-30 PROCEDURE — 99214 OFFICE O/P EST MOD 30 MIN: CPT | Performed by: FAMILY MEDICINE

## 2023-01-30 RX ORDER — IBUPROFEN 600 MG/1
600 TABLET ORAL 3 TIMES DAILY PRN
Qty: 30 TABLET | Refills: 0 | Status: SHIPPED | OUTPATIENT
Start: 2023-01-30

## 2023-01-30 RX ORDER — MELOXICAM 7.5 MG/1
TABLET ORAL
COMMUNITY
Start: 2023-01-20 | End: 2023-01-30

## 2023-01-30 RX ORDER — LEVOTHYROXINE SODIUM 0.03 MG/1
TABLET ORAL
COMMUNITY
Start: 2023-01-20

## 2023-01-30 RX ORDER — DULOXETIN HYDROCHLORIDE 30 MG/1
30 CAPSULE, DELAYED RELEASE ORAL DAILY
Qty: 30 CAPSULE | Refills: 0 | Status: SHIPPED | OUTPATIENT
Start: 2023-01-30

## 2023-01-30 NOTE — PATIENT INSTRUCTIONS
Hip Bursitis Exercises   AMBULATORY CARE:   Hip bursitis exercises  help strengthen the muscles in your hip and keep the joint flexible  Strong muscles can help reduce pain, prevent injury, and keep the joint stable  The exercises can also help increase the range of motion in your hip joint  What you need to know about exercise safety:   Move slowly and smoothly  Avoid fast or jerky motions  This will help prevent an injury  Breathe normally  Do not hold your breath  It is important to breathe in and out so you do not tense up during exercise  Tension could prevent you from moving your joint in a full range of motion  Do the exercises and stretches on both legs  Do this so both hips remain strong and flexible  Stop if you feel sharp pain or an increase in pain  Contact your healthcare provider or physical therapist  It is normal to feel some discomfort during exercise  Regular exercise will help decrease your discomfort over time  Warm up before you stretch and exercise  Walk or ride a stationary bike for 5 to 10 minutes  How to do hip stretches: Your healthcare provider or physical therapist will tell you how many times to do each stretch  Do the stretch on both sides before you move to the next stretch  Standing iliotibial band stretch:  Stand with the leg on your injured side behind your other leg  Bend sideways toward the side that is not injured  Stop when you feel a stretch in your outer hip  Hold for 5 to 10 seconds  Then return to the starting position  Lying iliotibial band stretch:  Lie on your back  Bend the knee on your injured side toward your chest  Place your hands on the outside of your knee and thigh  Slowly pull the knee across your body  Stop when you feel a stretch in your hip and outer thigh  Hold for 5 to 10 seconds  Return your leg to the starting position  Hip stretch:  Lie on your back with both legs straight and on the ground   Bend the knee on your injured side toward your chest until you can reach your lower leg  Place both hands on your shin and pull your knee toward your chest  Hold for 5 to 10 seconds  Return your leg to the starting position  Knee to chest:  Lie on your back with both knees bent and feet flat on the floor  Bend the knee on your injured side toward your chest until you can reach your lower leg  Place both hands on your shin and pull your knee toward your chest  Hold for 5 to 10 seconds  Return your leg to the starting position  Internal hip rotator stretch:  You will do this exercise on a table  Lie on your side with the injured hip on top  You may be told to keep a pillow between your thighs  Move the top leg so the foot hangs below the edge of the table  Rotate your hip to raise your foot in the opposite direction of the bottom shoulder  Raise your foot as high as you can so you feel a stretch in the back of your thigh  Hold for 5 seconds  Then slowly lower your foot to the starting position  External hip rotator stretch:  You will do this exercise on a table  Lie on your side with the injured hip on the bottom  You do not need a pillow between your thighs for this exercise  Move the bottom leg so the foot is off the edge of the table  Rotate your hip to lift the foot in the opposite direction of the bottom shoulder  Raise your foot as high as you can so you feel a stretch in your buttock  Hold for 5 seconds  Then slowly lower your foot to the starting position  Kneeling hip flexor stretch:  Kneel on your knee on the injured side  Place the foot of your other leg on the floor so the knee is bent  Put both hands on top of your thigh  Keep your back straight and abdominal muscles tight  Lean forward until you feel a stretch in your other thigh  Hold the stretch for 10 seconds  Return to the starting position  How to do hip strengthening exercises:   Your healthcare provider or physical therapist will tell you how many times to do each exercise  Do the exercise on both sides before you move to the next exercise  Straight leg lift to the side: This may also be called hip abduction  Lie on your side with straight legs, with the injured hip on top  Slowly raise your top leg toward the ceiling as high as you can  Keep your foot pointed  Hold for 5 seconds  Then slowly lower your leg to the starting position  Inner thigh lift: This may also be called hip adduction  Lie on your side with straight legs, with the injured hip on the bottom  Cross your top leg over your bottom leg  Put the foot of your top leg on the floor in front of you  Raise your bottom leg until it touches the top leg  Hold for 5 seconds  Then slowly lower the leg to the floor  Clam exercise:  Lie on your side so your injured side is on top  Bend your knees  Keep your heels together during this exercise  Slowly raise your top knee toward the ceiling  Then lower your leg so your knees are together  Call your doctor if:   You have sharp pain during exercise or at rest     You have questions or concerns about the stretches or exercises  © Copyright [employer] 2022 Information is for End User's use only and may not be sold, redistributed or otherwise used for commercial purposes  All illustrations and images included in CareNotes® are the copyrighted property of [employer]  or [address] [place] [address]  The above information is an  only  It is not intended as medical advice for individual conditions or treatments  Talk to your doctor, nurse or pharmacist before following any medical regimen to see if it is safe and effective for you  Duloxetine (By mouth)   Duloxetine ([id number]-[id number])  Treats depression, anxiety, diabetic peripheral neuropathy, fibromyalgia, and chronic muscle or bone pain  This medicine is an SSNRI     Brand Name(s): Louis Weir Irenka   There may be other brand names for this medicine  When This Medicine Should Not Be Used: This medicine is not right for everyone  Do not use it if you had an allergic reaction to duloxetine  How to Use This Medicine:   Capsule, Delayed Release Capsule  Take your medicine as directed  Your dose may need to be changed several times to find what works best for you  Delayed-release capsule: Swallow the capsule whole  Do not crush, chew, break, or open it  Do not open the Cymbalta® delayed-release capsule and sprinkle the contents on food or in liquids  If you have trouble swallowing the Alexia Rain delayed release capsule: You may open the capsule and sprinkle the contents over one tablespoon (15 mL) of applesauce  Swallow the mixture right away and do not save any of the mixture to use later  You may open the capsule and pour the contents to an all plastic catheter tip syringe and add 50 mL of water  Do not use other liquids  Gently shake it for 10 seconds, and then use it through a nasogastric tube  Rinse with additional water (about 15 mL) if needed  This medicine should come with a Medication Guide  Ask your pharmacist for a copy if you do not have one  Missed dose: Take a dose as soon as you remember  If it is almost time for your next dose, wait until then and take a regular dose  Do not take extra medicine to make up for a missed dose  Store the medicine in a closed container at room temperature, away from heat, moisture, and direct light  Drugs and Foods to Avoid:   Ask your doctor or pharmacist before using any other medicine, including over-the-counter medicines, vitamins, and herbal products  Do not take duloxetine if you have used an MAO inhibitor (MAOI) within the past 14 days  Do not start taking an MAO inhibitor within 5 days of stopping duloxetine  Ask your doctor if you are not sure if you take an MAOI, including linezolid or methylene blue injection  Some medicines can affect how duloxetine works   Tell your doctor if you are using any of the following:  Buspirone, cimetidine, ciprofloxacin, enoxacin, fentanyl, fluvoxamine, lithium, Oscar's wort, theophylline, tramadol, tryptophan, warfarin  Amphetamines  Blood pressure medicine  Diuretic (water pill)  Medicine for heart rhythm problems (including flecainide, propafenone, quinidine)  Medicine to treat migraine headaches (including triptans)  NSAID pain or arthritis medicine (including aspirin, celecoxib, diclofenac, ibuprofen, naproxen)  Other medicine to treat depression or mood disorders (including amitriptyline, desipramine, fluoxetine, imipramine, nortriptyline, paroxetine)  Phenothiazine medicine (including thioridazine)  Stomach medicine (including famotidine, antacids containing aluminum or magnesium, PPIs)  Tell your doctor if you use anything else that makes you sleepy  Some examples are allergy medicine, narcotic pain medicine, and alcohol  Do not drink alcohol while you are using this medicine  Warnings While Using This Medicine:   Tell your doctor if you are pregnant or breastfeeding, or if you have kidney disease, liver disease, bleeding problems, diabetes, digestion problems, glaucoma, heart disease, high or low blood pressure, or problems with urination  Tell your doctor if you smoke or you have a history of seizures, mental health problems (including bipolar disorder, swati), or drug or alcohol addiction  This medicine may cause the following problems:   Serious liver problems  Serotonin syndrome, when used with certain medicines  Increased risk of bleeding problems  Serious skin reactions, including erythema multiforme, Garcia-Gerry syndrome  Low sodium levels in the blood  Sexual problems  This medicine can increase thoughts of suicide  Tell your doctor right away if you start to feel depressed and have thoughts about hurting yourself    This medicine may cause blurred vision, dizziness, drowsiness, trouble with thinking, or trouble with controlling body movements, which may lead to falls, fractures, or other injuries  Do not drive or do anything that could be dangerous until you know how this medicine affects you  Stand up slowly to avoid falls  Do not stop using this medicine suddenly  Your doctor will need to slowly decrease your dose before you stop it completely  Your doctor will check your progress and the effects of this medicine at regular visits  Keep all appointments  Keep all medicine out of the reach of children  Never share your medicine with anyone  Possible Side Effects While Using This Medicine:   Call your doctor right away if you notice any of these side effects: Allergic reaction: Itching or hives, swelling in your face or hands, swelling or tingling in your mouth or throat, chest tightness, trouble breathing  Anxiety, restlessness, fever, fast heartbeat, sweating, muscle spasms, diarrhea, seeing or hearing things that are not there  Blistering, peeling, red skin rash  Confusion, weakness, muscle twitching  Dark urine or pale stools, nausea, vomiting, loss of appetite, stomach pain, yellow skin or eyes  Decrease in how much or how often you urinate  Decrease in sexual ability, desire, drive, or performance  Eye pain, vision changes, seeing halos around lights  Feeling more energetic than usual  Lightheadedness, dizziness, fainting  Unusual moods or behaviors, worsening depression, thoughts about hurting yourself, trouble sleeping  Unusual bleeding or bruising  If you notice these less serious side effects, talk with your doctor:   Decrease in appetite or weight  Dry mouth, constipation, mild nausea  Headache  Unusual drowsiness, sleepiness, or tiredness  If you notice other side effects that you think are caused by this medicine, tell your doctor  Call your doctor for medical advice about side effects   You may report side effects to FDA at 2-327-FDA-0298    © Copyright [employer] 2022 Information is for End User's use only and may not be sold, redistributed or otherwise used for commercial purposes  The above information is an  only  It is not intended as medical advice for individual conditions or treatments  Talk to your doctor, nurse or pharmacist before following any medical regimen to see if it is safe and effective for you

## 2023-01-30 NOTE — PROGRESS NOTES
Have you had an allergic reaction to the flu (influenza) shot? no  Are you allergic to eggs or any component of the flu vaccine? no  Do you have a history of Guillain-Daphne Syndrome (GBS), which is paralysis after receiving the flu vaccine? no  Are you feeling well today? yes  Flu vaccine given as ordered. Patient tolerated it well. No questions re: VIS information. After obtaining consent, and per orders of Dr. Malena Bueno, injection of FLU VACCINE given in Left deltoid by Yair Roa LPN. Patient tolerated well. Patient instructed to remain in clinic for 20 minutes afterwards, and to report any adverse reaction immediately.

## 2023-01-30 NOTE — LETTER
January 31, 2023     Syed Tripp MD  [address] [address]    Patient: Kacy Davey   YOB: 1965   Date of Visit: 1/30/2023       Dear Dr Ada Blanco:    Thank you for referring Renetta Urbano to me for evaluation  Below are my notes for this consultation  If you have questions, please do not hesitate to call me  I look forward to following your patient along with you  Sincerely,        Sanaz Chao MD        CC: No Recipients  Sanaz Chao MD  1/30/2023  2:03 PM  Addendum      Assessment  1  Primary osteoarthritis of right hip - Right  -     Ambulatory referral to Physical Therapy; Future    Right sided hip pain described primarily by arthritic features  Aching, nagging, indolent, stabbing, throbbing features in left hip radiating to left groin  5/5 strength bilaterally, negative SLR  Pain with internal and external rotation of right hip, ttp over right GTB  Significant OA in right hip with bone on bone apposition with bulky osteophytes about the right femoral head/neck junction; subcondral lucent lesions representing cysts but can be correlated for concurrent erosions on xray pelvis  Currently she is neurologically intact without gait instability, saddle anesthesia or bowel/bladder abnormality  Risks, benefits and alternatives to right hip intra-articular joint injection thoroughly discussed with patient vs orthopedic surgery referral given severity of pathology  Handouts provided questions answered to patient satisfaction  Plan  -Right hip intra-articular joint injection deferred for now; patient to call back to schedule procedure if she wishes after consultation with orthopedics  -referral orthopedics for right ELISEO  -f/u 4 weeks  -cymbalta 30mg/day ordered for patient; counseled regarding sedative effects of taking this medication and provided up titration calendar    Counseled not to take medication while driving or operating heavy machinery/using stairs  -Meloxicam 7 5 mg b i d  prn pain previously prescribed  Patient educated regarding bleeding risk of taking this medication as well as not taking any other nonsteroidal anti-inflammatory medications while taking this medication; counseled thoroughly regarding potential risk of Cardiovascular injury, Kidney injury, Gastrointestinal ulceration/bleeding  Patient voiced understanding  -script provided for formal physical therapy for right-hip osteoarthritis; Physician directed home exercise plan as per [facility] demonstrated and handouts provided that patient plans to participate with for 1 hour, twice a week for the next 6 weeks  There are risks associated with opioid medications, including dependence, addiction and tolerance  The patient understands and agrees to use these medications only as prescribed  Potential side effects of the medications include, but are not limited to, constipation, drowsiness, addiction, impaired judgment and risk of fatal overdose if not taken as prescribed  The patient was warned against driving while taking sedation medications or operating heavy machinery  The patient voiced understanding  Sharing medications is a felony  At this point in time, the patient is showing no signs of addiction, abuse, diversion or suicidal ideation  [facility] Prescription Drug Monitoring Program report was reviewed and was appropriate      Complete risks and benefits including bleeding, infection, tissue reaction, nerve injury and allergic reaction were discussed  The approach was demonstrated using models and literature was provided  Verbal and written consent was obtained  My impressions and treatment recommendations were discussed in detail with the patient who verbalized understanding and had no further questions  Discharge instructions were provided  I personally saw and examined the patient and I agree with the above discussed plan of care      New Medications Ordered This Visit   Medications   • HYDROCODONE-ACETAMINOPHEN PO     Sig: Take by mouth   • amoxicillin (AMOXIL) 875 mg tablet     Sig: Take by mouth   • amoxicillin-clavulanate (AUGMENTIN) 875-125 mg per tablet     Sig: Take by mouth   • butalbital-aspirin-caffeine (FIORINAL) -40 MG per tablet     Sig: Take by mouth   • fluticasone (FLONASE) 50 mcg/act nasal spray     Sig: into each nostril   • ibuprofen (MOTRIN) 800 mg tablet     Sig: Take by mouth   • levothyroxine 125 mcg tablet     Sig: Take by mouth   • naproxen (NAPROSYN) 500 mg tablet     Sig: Take by mouth   • penicillin V potassium (VEETID) 500 mg tablet     Sig: Take by mouth   • traMADol (ULTRAM) 50 mg tablet     Sig: Take 50 mg by mouth   • triamterene-hydrochlorothiazide (MAXZIDE) 75-50 MG per tablet     Sig: Take by mouth   • LEVOTHYROXINE SODIUM PO     Sig: Take by mouth   • Cholecalciferol (VITAMIN D3 PO)     Sig: Take by mouth   • albuterol (PROVENTIL HFA,VENTOLIN HFA) 90 mcg/act inhaler   • benzonatate (TESSALON PERLES) 100 mg capsule   • butalbital-acetaminophen-caffeine (FIORICET,ESGIC) -40 mg per tablet     Sig: Take by mouth   • FLUoxetine (PROzac) 40 MG capsule     Sig: Take by mouth   • potassium chloride (K-DUR,KLOR-CON) 20 mEq tablet     Sig: Take by mouth   • levothyroxine 25 mcg tablet   • meloxicam (MOBIC) 7 5 mg tablet       History of Present Illness    Carmita Beckford is a 61 y o  female with pmhx of hypothyroidism, RA, HTN presenting with significant right sided hip pain described primarily as arthritic in nature  She describes 8/10 right hip pain that is worse in the mornings and worse at the end of the day  The pain is characterized by achy, nagging, indolent, crampy, stabbing pain in her right hip  The patient describes that the pain is worse with standing for long periods of time on hard surfaces as well as with walking    The patient is a very active individual and feels as though this pain compromises her participation with independent activities of daily living  The pain can be debilitating at times and contribute to significant disability, compromising overall activity and independent activities of daily living  She has not yet tried physical therapy  Medications the patient has tried in the past include ibuprofen, meloxicam, norco   She describes no radicular symptoms and has good strength  She denies any weakness numbness or paresthesias  The patient denies any bowel or bladder dysfunction, saddle anesthesia  She endorses antalgic gait  I have personally reviewed and/or updated the patient's past medical history, past surgical history, family history, social history, current medications, allergies, and vital signs today  Review of Systems   Constitutional: Positive for activity change  HENT: Negative  Eyes: Negative  Respiratory: Negative  Cardiovascular: Negative  Gastrointestinal: Negative  Endocrine: Negative  Genitourinary: Negative  Musculoskeletal: Positive for arthralgias, gait problem and myalgias  Negative for back pain  Skin: Negative  Allergic/Immunologic: Negative  Neurological: Negative for weakness and numbness  Hematological: Negative  Psychiatric/Behavioral: Negative  All other systems reviewed and are negative  There is no problem list on file for this patient  Past Medical History:   Diagnosis Date   • Hypertension    • Hypothyroid        Past Surgical History:   Procedure Laterality Date   • ANTERIOR CRUCIATE LIGAMENT REPAIR Left 2005   • BUNIONECTOMY Left    • ENDOMETRIAL ABLATION     • FOOT SURGERY Bilateral    • ROTATOR CUFF REPAIR Left        History reviewed  No pertinent family history      Social History     Occupational History   • Not on file   Tobacco Use   • Smoking status: Never   • Smokeless tobacco: Never   Vaping Use   • Vaping Use: Never used   Substance and Sexual Activity   • Alcohol use: Yes     Comment: occasional   • Drug use: Not Currently   • Sexual activity: Not on file       Current Outpatient Medications on File Prior to Visit   Medication Sig   • butalbital-acetaminophen-caffeine (FIORICET,ESGIC) -40 mg per tablet Take by mouth   • chlorthalidone 25 mg tablet Take 25 mg by mouth   • hydroxychloroquine (PLAQUENIL) 200 mg tablet Take 200 mg by mouth 2 (two) times a day   • ibuprofen (MOTRIN) 800 mg tablet Take by mouth   • levothyroxine 200 mcg tablet Take 225 mcg by mouth    • levothyroxine 25 mcg tablet    • meloxicam (MOBIC) 7 5 mg tablet    • montelukast (SINGULAIR) 10 mg tablet Take by mouth   • albuterol (PROVENTIL HFA,VENTOLIN HFA) 90 mcg/act inhaler  (Patient not taking: Reported on 1/30/2023)   • amoxicillin (AMOXIL) 875 mg tablet Take by mouth (Patient not taking: Reported on 1/30/2023)   • amoxicillin-clavulanate (AUGMENTIN) 875-125 mg per tablet Take by mouth (Patient not taking: Reported on 1/30/2023)   • benzonatate (TESSALON PERLES) 100 mg capsule  (Patient not taking: Reported on 1/30/2023)   • butalbital-aspirin-caffeine (FIORINAL) -40 MG per tablet Take by mouth (Patient not taking: Reported on 1/30/2023)   • Cholecalciferol (VITAMIN D3 PO) Take by mouth (Patient not taking: Reported on 1/30/2023)   • doxycycline hyclate (VIBRAMYCIN) 50 mg capsule Take 50 mg by mouth 2 (two) times a day (Patient not taking: Reported on 1/30/2023)   • FLUoxetine (PROzac) 40 MG capsule Take by mouth (Patient not taking: Reported on 1/30/2023)   • fluticasone (FLONASE) 50 mcg/act nasal spray into each nostril (Patient not taking: Reported on 1/30/2023)   • HYDROCODONE-ACETAMINOPHEN PO Take by mouth (Patient not taking: Reported on 1/30/2023)   • levothyroxine 125 mcg tablet Take by mouth (Patient not taking: Reported on 1/30/2023)   • LEVOTHYROXINE SODIUM PO Take by mouth (Patient not taking: Reported on 1/30/2023)   • naproxen (NAPROSYN) 500 mg tablet Take by mouth (Patient not taking: Reported on 1/30/2023)   • penicillin V potassium (VEETID) 500 mg tablet Take by mouth (Patient not taking: Reported on 1/30/2023)   • potassium chloride (K-DUR,KLOR-CON) 20 mEq tablet Take by mouth (Patient not taking: Reported on 1/30/2023)   • traMADol (ULTRAM) 50 mg tablet Take 50 mg by mouth (Patient not taking: Reported on 1/30/2023)   • triamterene-hydrochlorothiazide (MAXZIDE) 75-50 MG per tablet Take by mouth (Patient not taking: Reported on 1/30/2023)     No current facility-administered medications on file prior to visit  Allergies   Allergen Reactions   • Molds & Smuts      Seasonal per patient   • Sulfa Antibiotics Rash     Physical Exam    /84   Pulse 89   Temp 97 9 °F (36 6 °C)   Resp 20   Ht 5' 4 5" (1 638 m)   Wt 87 1 kg (192 lb)   BMI 32 45 kg/m²     Constitutional: normal, well developed, well nourished, alert, in no distress and non-toxic and no overt pain behavior  and obese  Eyes: anicteric  HEENT: grossly intact  Neck: supple, symmetric, trachea midline and no masses   Pulmonary:even and unlabored  Cardiovascular:No edema or pitting edema present  Skin:Normal without rashes or lesions and well hydrated  Psychiatric:Mood and affect appropriate  Neurologic:Cranial Nerves II-XII grossly intact Sensation grossly intact; no clonus negative varma's  Reflexes 2+ and brisk  SLR negative bilaterally  Musculoskeletal:normal gait  5/5 strength bilaterally with AROM in lower extremities  Normal heel toe and tip toe walking  No pain with lumbar facet loading bilaterally and with lateral spine rotation  No ttp over lumbar paraspinal muscles  Negative gildardo's test, negative gaenslen's negative SIJ loading bilaterally   Pain with internal/external rotation of right hip; ttp over right gtb    Imaging    Xray PELVIS:    Significant OA in right hip with bone on bone apposition with bulky osteophytes about the right femoral head/neck junction; subcondral lucent lesions representing cysts but can be correlated for concurrent erosions

## 2023-01-30 NOTE — PROGRESS NOTES
[facility]  [address]  Dept: [phone]  Dept YXO:[phone]    Cristino Robbins is a 61 y.o. female who presents today for her medical conditions/complaints as notedbelow. Cristino Robbins is c/o of Diabetes (6mo follow up)      Assessment/Plan:     1. Type 2 diabetes mellitus without complication, without long-term current use of insulin (HCC)  -     POCT glycosylated hemoglobin (Hb A1C)  -     Semaglutide,0.25 or 0.5MG/DOS, 2 MG/1.5ML SOPN; Inject 0.25 mg into the skin once a week, Disp-1 Adjustable Dose Pre-filled Pen Syringe, R-1Normal  2. Depression with anxiety  3. Primary hypertension  4. Mixed hyperlipidemia  5. Vitamin D deficiency  6. Visit for screening mammogram  -     [facility] DIGITAL SCREEN W OR WO CAD BILATERAL; Future  7. Colon cancer screening  -     Patricia Go DO, General Surgery, [place]      Suspect the abilify has significantly contributed to the Weight gain and the elevation of the lipids. It has significantly improved her mental health however so would not recommend stopping this unless in a very stable place. Will add in the [address]. With her history of bulimia watch carefully for nausea and encouraged her to continue with closely with her counselor. Diabetes is otherwise asymptomatic and reasonably controlled. Hypertension hyperlipidemia are both asymptomatic and well controlled. No changes in her current medications. Vitamin D 1000 units once daily.      Lab Results   Component Value Date    WBC 4.6 (L) 01/19/2022    HGB 13.6 01/19/2022    HCT 40.2 01/19/2022    .9 01/19/2022    CHOL 289 (H) 01/23/2023    TRIG 80 01/23/2023    HDL 93 (H) 01/23/2023    ALT 26 01/23/2023    AST 28 01/23/2023     01/23/2023    K 4.6 01/23/2023     01/23/2023    CREATININE 0.7 01/23/2023    BUN 17 01/23/2023    CO2 30 01/23/2023    TSH 0.90 12/03/2021    LABA1C 6.0 01/30/2023    LABA1C 5.8 07/28/2022    LABA1C 5.5 03/01/2022    LABMICR CANNOT BE CALCULATED 02/18/2021       Return in about 3 months (around 4/30/2023) for Medication recheck. Subjective:      HPI:     RHONDA Sanchez is a 61 y.o. female who presents for follow-up of hypertension, diabetes, and hyperlipidemia. She indicates that she is feeling well and denies any symptoms referable to her elevated blood pressure or diabetes. Specifically denies chest pain, palpitations, dyspnea, peripheral edema, thirst, frequent urination, and blurred vision. Current medication regimen is as listed below. She denies any side effects of medication, and has been taking it regularly. Home glucose readings have been higher. Checks blood sugars once in awhile. Last eye exam was7/22 withEliceo. Diabetic complications includenone. shehas been exercising regularly several days per week. Hasbeen following a diabetic diet but has had some binging and more than she she should she knows. Labs are elevated- show very poorly controlled hyperlipidemia. HDL is still very good at 93- LDL at 180. Total cholesterol at 289. Vit D low also today. Has rectocele-- bowels are hard at times -- occasional blood on the Tp- 1-2 times per week only. No abd pain. Occ nausea. No significant abdominal pain.   Some cramping at times but no other pain      BP Readings from Last 3 Encounters:   01/30/23 136/86   01/12/23 138/80   07/28/22 126/74          (goal 120/80)    Wt Readings from Last 3 Encounters:   07/28/22 212 lb (96.2 kg)   03/04/22 202 lb 1.6 oz (91.7 kg)   03/01/22 197 lb (89.4 kg)        Past Medical History:   Diagnosis Date    History of bulimia     in 19 and 30's    Hyperlipidemia     Hypertension     Type 2 diabetes mellitus without complication, without long-term current use of insulin ([facility] [address] 7/29/2018      Past Surgical History:   Procedure Laterality Date    ANKLE SURGERY      scope    CERVICAL POLYP REMOVAL  01/25/2017    CHOLECYSTECTOMY DILATION AND CURETTAGE OF UTERUS  01/2017    due to menorrhagia    TUBAL LIGATION         Family History   Problem Relation Age of Onset    Arthritis Mother     Cancer Mother         endometrial       Social History     Tobacco Use    Smoking status: Never    Smokeless tobacco: Never   Substance Use Topics    Alcohol use: No      Current Outpatient Medications   Medication Sig Dispense Refill    Semaglutide,0.25 or 0.5MG/DOS, 2 MG/1.5ML SOPN Inject 0.25 mg into the skin once a week 1 Adjustable Dose Pre-filled Pen Syringe 1    ARIPiprazole (ABILIFY) 5 MG tablet Take 5 mg by mouth daily      busPIRone (BUSPAR) 15 MG tablet Take 15 mg by mouth in the morning, at noon, and at bedtime      buPROPion (WELLBUTRIN XL) 150 MG extended release tablet Take 150 mg by mouth in the morning. With 300 mg tab. buPROPion (WELLBUTRIN XL) 300 MG extended release tablet Take 300 mg by mouth every morning With 150 mg tab      TRINTELLIX 20 MG TABS tablet TAKE 1 TABLET BY MOUTH IN THE MORNING      glucose blood VI test strips (ASCENSIA AUTODISC VI;ONE TOUCH ULTRA TEST VI) strip 1 each by In Vitro route daily As needed. 100 each 3    Lancets MISC Check sugars daily 100 each 3     No current facility-administered medications for this visit.      No Known Allergies    Health Maintenance   Topic Date Due    HIV screen  Never done    Hepatitis B vaccine (1 of 3 - Risk 3-dose series) Never done    Colorectal Cancer Screen  Never done    Cervical cancer screen  01/25/2020    COVID-19 Vaccine (4 - Booster for Moderna series) 01/13/2022    Diabetic Alb to Cr ratio (uACR) test  02/18/2022    Diabetic foot exam  03/01/2023    Diabetic retinal exam  07/18/2023    Depression Monitoring  01/12/2024    Lipids  01/23/2024    GFR test (Diabetes, CKD 3-4, OR last GFR 15-59)  01/23/2024    A1C test (Diabetic or Prediabetic)  01/30/2024    Breast cancer screen  02/02/2025    DTaP/Tdap/Td vaccine (2 - Td or Tdap) 11/04/2031    Flu vaccine  Completed Shingles vaccine  Completed    Pneumococcal 0-64 years Vaccine  Completed    Hepatitis C screen  Completed    Hepatitis A vaccine  Aged Out    Hib vaccine  Aged Out    Meningococcal (ACWY) vaccine  Aged Out         Review of Systems    Objective:     /86 (Site: Left Upper Arm, Position: Sitting, Cuff Size: Large Adult)   Pulse 72   LMP  (LMP Unknown) Comment: more than a couple years  SpO2 99%     Physical Exam  Vitals and nursing note reviewed. Constitutional:       Appearance: Normal appearance. She is well-developed. HENT:      Head: Normocephalic and atraumatic. Right Ear: External ear normal.      Left Ear: External ear normal.   Eyes:      Extraocular Movements: Extraocular movements intact. Conjunctiva/sclera: Conjunctivae normal.   Neck:      Thyroid: No thyromegaly. Vascular: No JVD. Cardiovascular:      Rate and Rhythm: Normal rate and regular rhythm. Pulses: Normal pulses. Heart sounds: Normal heart sounds. No murmur heard. No friction rub. No gallop. Pulmonary:      Effort: Pulmonary effort is normal. No respiratory distress. Breath sounds: Normal breath sounds. Abdominal:      Palpations: Abdomen is soft. There is no mass. Tenderness: There is no abdominal tenderness. Musculoskeletal:      Cervical back: Normal range of motion and neck supple. Right lower leg: No edema. Left lower leg: No edema. Lymphadenopathy:      Cervical: No cervical adenopathy. Skin:     General: Skin is warm. Capillary Refill: Capillary refill takes less than 2 seconds. Neurological:      General: No focal deficit present. Mental Status: She is alert and oriented to person, place, and time. Psychiatric:         Mood and Affect: Mood normal.         Behavior: Behavior normal.         Thought Content:  Thought content normal.         Judgment: Judgment normal.             Multiple labs and other testing may have been ordered which may not be completely evident from the above note due to system interface incompatibilities. Patient given educational materials - see patientinstructions. Discussed use, benefit, and side effects of prescribed medications. All patient questions answered. Pt voiced understanding. Reviewed health maintenance. Instructed to continue current medications, diet andexercise. Patient agreed with treatment plan. Follow up as directed.      (Please note that portions of this note were completed with a voice-recognition program. Efforts were made to edit the dictation but occasionally words are mis-transcribed.)    Electronically signed by Angeline Yung MD on 2/5/2023

## 2023-01-30 NOTE — PROGRESS NOTES
Assessment  1  Primary osteoarthritis of right hip - Right  -     Ambulatory referral to Physical Therapy; Future    Right sided hip pain described primarily by arthritic features  Aching, nagging, indolent, stabbing, throbbing features in left hip radiating to left groin  5/5 strength bilaterally, negative SLR  Pain with internal and external rotation of right hip, ttp over right GTB  Significant OA in right hip with bone on bone apposition with bulky osteophytes about the right femoral head/neck junction; subcondral lucent lesions representing cysts but can be correlated for concurrent erosions on xray pelvis  Currently she is neurologically intact without gait instability, saddle anesthesia or bowel/bladder abnormality  Risks, benefits and alternatives to right hip intra-articular joint injection thoroughly discussed with patient vs orthopedic surgery referral given severity of pathology  Handouts provided questions answered to patient satisfaction  Plan  -Right hip intra-articular joint injection deferred for now; patient to call back to schedule procedure if she wishes after consultation with orthopedics  -referral orthopedics for right ELISEO  -f/u 4 weeks  -cymbalta 30mg/day ordered for patient; counseled regarding sedative effects of taking this medication and provided up titration calendar  Counseled not to take medication while driving or operating heavy machinery/using stairs  -Meloxicam 7 5 mg b i d  prn pain previously prescribed  Patient educated regarding bleeding risk of taking this medication as well as not taking any other nonsteroidal anti-inflammatory medications while taking this medication; counseled thoroughly regarding potential risk of Cardiovascular injury, Kidney injury, Gastrointestinal ulceration/bleeding  Patient voiced understanding  -script provided for formal physical therapy for right-hip osteoarthritis;  Physician directed home exercise plan as per [facility] demonstrated and handouts provided that patient plans to participate with for 1 hour, twice a week for the next 6 weeks  There are risks associated with opioid medications, including dependence, addiction and tolerance  The patient understands and agrees to use these medications only as prescribed  Potential side effects of the medications include, but are not limited to, constipation, drowsiness, addiction, impaired judgment and risk of fatal overdose if not taken as prescribed  The patient was warned against driving while taking sedation medications or operating heavy machinery  The patient voiced understanding  Sharing medications is a felony  At this point in time, the patient is showing no signs of addiction, abuse, diversion or suicidal ideation  [place] Prescription Drug Monitoring Program report was reviewed and was appropriate      Complete risks and benefits including bleeding, infection, tissue reaction, nerve injury and allergic reaction were discussed  The approach was demonstrated using models and literature was provided  Verbal and written consent was obtained  My impressions and treatment recommendations were discussed in detail with the patient who verbalized understanding and had no further questions  Discharge instructions were provided  I personally saw and examined the patient and I agree with the above discussed plan of care      New Medications Ordered This Visit   Medications   • HYDROCODONE-ACETAMINOPHEN PO     Sig: Take by mouth   • amoxicillin (AMOXIL) 875 mg tablet     Sig: Take by mouth   • amoxicillin-clavulanate (AUGMENTIN) 875-125 mg per tablet     Sig: Take by mouth   • butalbital-aspirin-caffeine (FIORINAL) -40 MG per tablet     Sig: Take by mouth   • fluticasone (FLONASE) 50 mcg/act nasal spray     Sig: into each nostril   • ibuprofen (MOTRIN) 800 mg tablet     Sig: Take by mouth   • levothyroxine 125 mcg tablet     Sig: Take by mouth   • naproxen (NAPROSYN) 500 mg tablet Sig: Take by mouth   • penicillin V potassium (VEETID) 500 mg tablet     Sig: Take by mouth   • traMADol (ULTRAM) 50 mg tablet     Sig: Take 50 mg by mouth   • triamterene-hydrochlorothiazide (MAXZIDE) 75-50 MG per tablet     Sig: Take by mouth   • LEVOTHYROXINE SODIUM PO     Sig: Take by mouth   • Cholecalciferol (VITAMIN D3 PO)     Sig: Take by mouth   • albuterol (PROVENTIL HFA,VENTOLIN HFA) 90 mcg/act inhaler   • benzonatate (TESSALON PERLES) 100 mg capsule   • butalbital-acetaminophen-caffeine (FIORICET,ESGIC) -40 mg per tablet     Sig: Take by mouth   • FLUoxetine (PROzac) 40 MG capsule     Sig: Take by mouth   • potassium chloride (K-DUR,KLOR-CON) 20 mEq tablet     Sig: Take by mouth   • levothyroxine 25 mcg tablet   • meloxicam (MOBIC) 7 5 mg tablet       History of Present Illness    Hang Millan is a 61 y o  female with pmhx of hypothyroidism, RA, HTN presenting with significant right sided hip pain described primarily as arthritic in nature  She describes 8/10 right hip pain that is worse in the mornings and worse at the end of the day  The pain is characterized by achy, nagging, indolent, crampy, stabbing pain in her right hip  The patient describes that the pain is worse with standing for long periods of time on hard surfaces as well as with walking  The patient is a very active individual and feels as though this pain compromises her participation with independent activities of daily living  The pain can be debilitating at times and contribute to significant disability, compromising overall activity and independent activities of daily living  She has not yet tried physical therapy  Medications the patient has tried in the past include ibuprofen, meloxicam, norco   She describes no radicular symptoms and has good strength  She denies any weakness numbness or paresthesias  The patient denies any bowel or bladder dysfunction, saddle anesthesia  She endorses antalgic gait       I have personally reviewed and/or updated the patient's past medical history, past surgical history, family history, social history, current medications, allergies, and vital signs today  Review of Systems   Constitutional: Positive for activity change  HENT: Negative  Eyes: Negative  Respiratory: Negative  Cardiovascular: Negative  Gastrointestinal: Negative  Endocrine: Negative  Genitourinary: Negative  Musculoskeletal: Positive for arthralgias, gait problem and myalgias  Negative for back pain  Skin: Negative  Allergic/Immunologic: Negative  Neurological: Negative for weakness and numbness  Hematological: Negative  Psychiatric/Behavioral: Negative  All other systems reviewed and are negative  There is no problem list on file for this patient  Past Medical History:   Diagnosis Date   • Hypertension    • Hypothyroid        Past Surgical History:   Procedure Laterality Date   • ANTERIOR CRUCIATE LIGAMENT REPAIR Left 2005   • BUNIONECTOMY Left    • ENDOMETRIAL ABLATION     • FOOT SURGERY Bilateral    • ROTATOR CUFF REPAIR Left        History reviewed  No pertinent family history      Social History     Occupational History   • Not on file   Tobacco Use   • Smoking status: Never   • Smokeless tobacco: Never   Vaping Use   • Vaping Use: Never used   Substance and Sexual Activity   • Alcohol use: Yes     Comment: occasional   • Drug use: Not Currently   • Sexual activity: Not on file       Current Outpatient Medications on File Prior to Visit   Medication Sig   • butalbital-acetaminophen-caffeine (FIORICET,ESGIC) -40 mg per tablet Take by mouth   • chlorthalidone 25 mg tablet Take 25 mg by mouth   • hydroxychloroquine (PLAQUENIL) 200 mg tablet Take 200 mg by mouth 2 (two) times a day   • ibuprofen (MOTRIN) 800 mg tablet Take by mouth   • levothyroxine 200 mcg tablet Take 225 mcg by mouth    • levothyroxine 25 mcg tablet    • meloxicam (MOBIC) 7 5 mg tablet    • montelukast (SINGULAIR) 10 mg tablet Take by mouth   • albuterol (PROVENTIL HFA,VENTOLIN HFA) 90 mcg/act inhaler  (Patient not taking: Reported on 1/30/2023)   • amoxicillin (AMOXIL) 875 mg tablet Take by mouth (Patient not taking: Reported on 1/30/2023)   • amoxicillin-clavulanate (AUGMENTIN) 875-125 mg per tablet Take by mouth (Patient not taking: Reported on 1/30/2023)   • benzonatate (TESSALON PERLES) 100 mg capsule  (Patient not taking: Reported on 1/30/2023)   • butalbital-aspirin-caffeine (Justyna Inch) -40 MG per tablet Take by mouth (Patient not taking: Reported on 1/30/2023)   • Cholecalciferol (VITAMIN D3 PO) Take by mouth (Patient not taking: Reported on 1/30/2023)   • doxycycline hyclate (VIBRAMYCIN) 50 mg capsule Take 50 mg by mouth 2 (two) times a day (Patient not taking: Reported on 1/30/2023)   • FLUoxetine (PROzac) 40 MG capsule Take by mouth (Patient not taking: Reported on 1/30/2023)   • fluticasone (FLONASE) 50 mcg/act nasal spray into each nostril (Patient not taking: Reported on 1/30/2023)   • HYDROCODONE-ACETAMINOPHEN PO Take by mouth (Patient not taking: Reported on 1/30/2023)   • levothyroxine 125 mcg tablet Take by mouth (Patient not taking: Reported on 1/30/2023)   • LEVOTHYROXINE SODIUM PO Take by mouth (Patient not taking: Reported on 1/30/2023)   • naproxen (NAPROSYN) 500 mg tablet Take by mouth (Patient not taking: Reported on 1/30/2023)   • penicillin V potassium (VEETID) 500 mg tablet Take by mouth (Patient not taking: Reported on 1/30/2023)   • potassium chloride (K-DUR,KLOR-CON) 20 mEq tablet Take by mouth (Patient not taking: Reported on 1/30/2023)   • traMADol (ULTRAM) 50 mg tablet Take 50 mg by mouth (Patient not taking: Reported on 1/30/2023)   • triamterene-hydrochlorothiazide (MAXZIDE) 75-50 MG per tablet Take by mouth (Patient not taking: Reported on 1/30/2023)     No current facility-administered medications on file prior to visit         Allergies   Allergen Reactions • Molds & Smuts      Seasonal per patient   • Sulfa Antibiotics Rash     Physical Exam    /84   Pulse 89   Temp 97 9 °F (36 6 °C)   Resp 20   Ht 5' 4 5" (1 638 m)   Wt 87 1 kg (192 lb)   BMI 32 45 kg/m²     Constitutional: normal, well developed, well nourished, alert, in no distress and non-toxic and no overt pain behavior  and obese  Eyes: anicteric  HEENT: grossly intact  Neck: supple, symmetric, trachea midline and no masses   Pulmonary:even and unlabored  Cardiovascular:No edema or pitting edema present  Skin:Normal without rashes or lesions and well hydrated  Psychiatric:Mood and affect appropriate  Neurologic:Cranial Nerves II-XII grossly intact Sensation grossly intact; no clonus negative varma's  Reflexes 2+ and brisk  SLR negative bilaterally  Musculoskeletal:normal gait  5/5 strength bilaterally with AROM in lower extremities  Normal heel toe and tip toe walking  No pain with lumbar facet loading bilaterally and with lateral spine rotation  No ttp over lumbar paraspinal muscles  Negative gildardo's test, negative gaenslen's negative SIJ loading bilaterally   Pain with internal/external rotation of right hip; ttp over right gtb    Imaging    Xray PELVIS:    Significant OA in right hip with bone on bone apposition with bulky osteophytes about the right femoral head/neck junction; subcondral lucent lesions representing cysts but can be correlated for concurrent erosions

## 2023-02-17 ENCOUNTER — OFFICE VISIT (OUTPATIENT)
Dept: SURGERY | Age: 58
End: 2023-02-17

## 2023-02-17 VITALS
OXYGEN SATURATION: 99 % | DIASTOLIC BLOOD PRESSURE: 84 MMHG | HEART RATE: 77 BPM | WEIGHT: 220 LBS | HEIGHT: 65 IN | TEMPERATURE: 97.4 F | SYSTOLIC BLOOD PRESSURE: 148 MMHG | BODY MASS INDEX: 36.65 KG/M2 | RESPIRATION RATE: 18 BRPM

## 2023-02-17 DIAGNOSIS — N81.6 RECTOCELE: ICD-10-CM

## 2023-02-17 DIAGNOSIS — Z12.11 COLON CANCER SCREENING: Primary | ICD-10-CM

## 2023-02-17 DIAGNOSIS — K62.5 RECTAL BLEEDING: ICD-10-CM

## 2023-02-17 NOTE — PROGRESS NOTES
Name:  Doris Closs  Age:  61 y.o.   :  1965    Physician: Rodrigue Weaver MD       Chief Complaint: Colon cancer screening      HPI:  Has occasional rectal bleeding. Wanted to discuss the procedure as she has a rectocele and is concerned  that may cause problems. MEDICAL HISTORY:    Past Medical History:        Diagnosis Date    History of bulimia     in 19 and 30's    Hyperlipidemia     Hypertension     Type 2 diabetes mellitus without complication, without long-term current use of insulin ([facility] [address] 2018       Past Surgical History:        Procedure Laterality Date    ANKLE SURGERY      scope    CERVICAL POLYP REMOVAL  2017    CHOLECYSTECTOMY      DILATION AND CURETTAGE OF UTERUS  2017    due to menorrhagia    TUBAL LIGATION         Prior to Admission medications    Medication Sig Start Date End Date Taking? Authorizing Provider   Semaglutide,0.25 or 0.5MG/DOS, 2 MG/1.5ML SOPN Inject 0.25 mg into the skin once a week 23  Yes Christina Ariza MD   busPIRone (BUSPAR) 15 MG tablet Take 15 mg by mouth in the morning, at noon, and at bedtime 23  Yes Historical Provider, MD   buPROPion (WELLBUTRIN XL) 300 MG extended release tablet Take 300 mg by mouth every morning With 150 mg tab 22  Yes Historical Provider, MD   TRINTELLIX 20 MG TABS tablet TAKE 1 TABLET BY MOUTH IN THE MORNING 22  Yes Historical Provider, MD   glucose blood VI test strips (ASCENSIA AUTODISC VI;ONE TOUCH ULTRA TEST VI) strip 1 each by In Vitro route daily As needed. 18  Yes Christina Ariza MD   Lancets MISC Check sugars daily 18  Yes Christina Ariza MD   ARIPiprazole (ABILIFY) 5 MG tablet Take 5 mg by mouth daily    Historical Provider, MD   buPROPion (WELLBUTRIN XL) 150 MG extended release tablet Take 150 mg by mouth in the morning. With 300 mg tab. 22   Historical Provider, MD       No Known Allergies     reports that she has never smoked.  She has never used smokeless tobacco. reports no history of alcohol use. Family History   Problem Relation Age of Onset    Arthritis Mother     Cancer Mother         endometrial            REVIEW OF SYSTEMS:  General:  No fever, chills weight  loss  Eye:  negative   ENT:negative  Allergy/Immunology:  negative  Hematology/Lymphatic: negative  Lungs: No  shortness of breath. Cardiovascular: negative  Gastrointestinal: negative  : negative  Neurological: negative      PHYSICAL EXAM:    BP (!) 148/84 (Site: Left Upper Arm, Position: Sitting)   Pulse 77   Temp 97.4 °F (36.3 °C)   Resp 18   Ht 5' 5\" (1.651 m)   Wt 220 lb (99.8 kg)   LMP  (LMP Unknown) Comment: more than a couple years  SpO2 99%   BMI 36.61 kg/m²       Gen: Alert and oriented x3, no acute distress, well-appearing    Eyes: PERRL, Sclera Anicteric    Head: Normocephalic, non tender     Neck: Supple, no significant adenopathy. No carotid bruits, thyroid normal size and no masses    Chest: CTA, no wheezes, no rales, no rhonchi, symmetrical    Heart: Normal rate, regular rhythm, no murmurs    Abdomen: Soft, positive bowel sounds, non tender, non distended, no masses, no hernias, no HSM, no bruits. Neuro: Normal speech, motor/sensory grossly normal bilateral    MSK: No joint tenderness, deformity, or swelling           ASSESSMENT:  1) Colon Cancer Screening  2) Sporadic rectal bleeding  3) Known rectocele - should not cause a problem with a  colonoscopy. PLAN:  1) Colonoscopy - Risks and benefits of colonoscopy were discussed with Fidel Corado. In particular I discussed the possibility of incomplete colonoscopy and failure to make a diagnosis. I also discussed the risks and consequences of reactions to the sedatives, bleeding and perforation. Alternate ways of evaluating the colon including barium enema, CT colonography and sigmoidoscopy were discussed.   she was also given the opportunity to have any questions answered, and encouraged to call the office with additional issues.       Electronically signed by Nisha Allan MD on 2/17/2023 at 1:27 PM

## 2023-02-27 ENCOUNTER — PATIENT MESSAGE (OUTPATIENT)
Dept: FAMILY MEDICINE CLINIC | Age: 58
End: 2023-02-27

## 2023-02-27 DIAGNOSIS — E11.9 TYPE 2 DIABETES MELLITUS WITHOUT COMPLICATION, WITHOUT LONG-TERM CURRENT USE OF INSULIN (HCC): ICD-10-CM

## 2023-02-27 NOTE — TELEPHONE ENCOUNTER
Belia Gibbs is requesting a refill on the following medication(s):  Requested Prescriptions     Pending Prescriptions Disp Refills    Semaglutide,0.25 or 0.5MG/DOS, 2 MG/1.5ML SOPN 1 Adjustable Dose Pre-filled Pen Syringe 1     Sig: Inject 0.5 mg into the skin once a week       Last Visit Date (If Applicable):  1/85/0414    Next Visit Date:    5/8/2023

## 2023-02-27 NOTE — TELEPHONE ENCOUNTER
From: Tere Davis  To: Dr. Lori Reis: 2/27/2023 1:39 PM EST  Subject: Refill    [facility] said they need a prescription to refill my Ozempic pen to .5.

## 2023-03-07 ENCOUNTER — TELEPHONE (OUTPATIENT)
Dept: SURGERY | Age: 58
End: 2023-03-07

## 2023-03-07 NOTE — TELEPHONE ENCOUNTER
Spoke with patient about her inadequate bowel prep and explained that Dr. Jason Ortiz suggested that she do a 2-day bowel prep and she stated she would think about it and give us a call back.

## 2023-04-19 RX ORDER — SEMAGLUTIDE 0.68 MG/ML
INJECTION, SOLUTION SUBCUTANEOUS
Qty: 3 ML | Refills: 0 | OUTPATIENT
Start: 2023-04-19

## 2023-05-08 ENCOUNTER — OFFICE VISIT (OUTPATIENT)
Dept: FAMILY MEDICINE CLINIC | Age: 58
End: 2023-05-08
Payer: COMMERCIAL

## 2023-05-08 VITALS — DIASTOLIC BLOOD PRESSURE: 74 MMHG | HEART RATE: 72 BPM | SYSTOLIC BLOOD PRESSURE: 112 MMHG | OXYGEN SATURATION: 96 %

## 2023-05-08 DIAGNOSIS — E78.2 MIXED HYPERLIPIDEMIA: ICD-10-CM

## 2023-05-08 DIAGNOSIS — E66.01 SEVERE OBESITY (BMI 35.0-39.9) WITH COMORBIDITY (HCC): ICD-10-CM

## 2023-05-08 DIAGNOSIS — E11.9 TYPE 2 DIABETES MELLITUS WITHOUT COMPLICATION, WITHOUT LONG-TERM CURRENT USE OF INSULIN (HCC): Primary | ICD-10-CM

## 2023-05-08 DIAGNOSIS — F33.9 RECURRENT MAJOR DEPRESSION RESISTANT TO TREATMENT (HCC): ICD-10-CM

## 2023-05-08 DIAGNOSIS — I10 PRIMARY HYPERTENSION: ICD-10-CM

## 2023-05-08 PROCEDURE — 3078F DIAST BP <80 MM HG: CPT | Performed by: FAMILY MEDICINE

## 2023-05-08 PROCEDURE — 99212 OFFICE O/P EST SF 10 MIN: CPT | Performed by: FAMILY MEDICINE

## 2023-05-08 PROCEDURE — 3074F SYST BP LT 130 MM HG: CPT | Performed by: FAMILY MEDICINE

## 2023-05-08 PROCEDURE — 99214 OFFICE O/P EST MOD 30 MIN: CPT | Performed by: FAMILY MEDICINE

## 2023-05-08 PROCEDURE — 3044F HG A1C LEVEL LT 7.0%: CPT | Performed by: FAMILY MEDICINE

## 2023-05-08 RX ORDER — BUSPIRONE HYDROCHLORIDE 30 MG/1
30 TABLET ORAL 2 TIMES DAILY
COMMUNITY
Start: 2023-04-28

## 2023-05-08 RX ORDER — ROSUVASTATIN CALCIUM 10 MG/1
10 TABLET, COATED ORAL NIGHTLY
Qty: 30 TABLET | Refills: 5 | Status: SHIPPED | OUTPATIENT
Start: 2023-05-08

## 2023-05-08 RX ORDER — DESVENLAFAXINE 25 MG/1
25 TABLET, EXTENDED RELEASE ORAL DAILY
COMMUNITY

## 2023-05-08 SDOH — ECONOMIC STABILITY: FOOD INSECURITY: WITHIN THE PAST 12 MONTHS, YOU WORRIED THAT YOUR FOOD WOULD RUN OUT BEFORE YOU GOT MONEY TO BUY MORE.: NEVER TRUE

## 2023-05-08 SDOH — ECONOMIC STABILITY: FOOD INSECURITY: WITHIN THE PAST 12 MONTHS, THE FOOD YOU BOUGHT JUST DIDN'T LAST AND YOU DIDN'T HAVE MONEY TO GET MORE.: NEVER TRUE

## 2023-05-08 SDOH — ECONOMIC STABILITY: HOUSING INSECURITY
IN THE LAST 12 MONTHS, WAS THERE A TIME WHEN YOU DID NOT HAVE A STEADY PLACE TO SLEEP OR SLEPT IN A SHELTER (INCLUDING NOW)?: NO

## 2023-05-08 SDOH — ECONOMIC STABILITY: INCOME INSECURITY: HOW HARD IS IT FOR YOU TO PAY FOR THE VERY BASICS LIKE FOOD, HOUSING, MEDICAL CARE, AND HEATING?: NOT HARD AT ALL

## 2023-05-08 NOTE — PROGRESS NOTES
[address] [facility]  [address]  Dept: [phone]  Dept IGL:[phone]    Isreal Dickson is a 61 y.o. female who presents today for her medical conditions/complaints as notedbelow. Isreal Dickson is c/o of Diabetes (3mo follow up)    Assessment/Plan:     1. Type 2 diabetes mellitus without complication, without long-term current use of insulin ([facility])  -      DIABETES FOOT EXAM  2. Recurrent major depression resistant to treatment ([facility] [address]  3. Severe obesity (BMI 35.0-39. 9) with comorbidity ([facility] [address]  4. Primary hypertension  5. Mixed hyperlipidemia  -     rosuvastatin (CRESTOR) 10 MG tablet; Take 1 tablet by mouth nightly, Disp-30 tablet, R-5Normal    Diabetes control appears to be improving with the addition of the Ozempic. She is tolerating well. We will continue on this current dose at this time. Mood seems to be improving with the medication adjustments made by her psychiatrist.  We will continue along no adjustments needed at this time. Reviewed recent labs including the lipid profile. With the elevated risk of the diabetes and the marked increase in the overall cholesterol will increase the treatment from diet and exercise to include Crestor. Reviewed expected side effects    Lab Results   Component Value Date    WBC 5.3 02/23/2023    HGB 13.3 02/23/2023    HCT 41.3 02/23/2023    .0 02/23/2023    CHOL 277 (H) 02/23/2023    TRIG 106 02/23/2023    HDL 82 (H) 02/23/2023    ALT 25 02/23/2023    AST 25 02/23/2023     02/23/2023    K 4.3 02/23/2023     02/23/2023    CREATININE 0.8 02/23/2023    BUN 16 02/23/2023    CO2 30 02/23/2023    TSH 1.00 02/23/2023    LABA1C 5.5 02/23/2023    LABA1C 6.0 01/30/2023    LABA1C 5.8 07/28/2022    LABMICR CANNOT BE CALCULATED 02/18/2021       Return in about 6 months (around 11/8/2023) for Medication recheck.         Subjective:      HPI:     HPI    Isreal Dickson is a 61 y.o. female who

## 2023-08-29 DIAGNOSIS — E11.9 TYPE 2 DIABETES MELLITUS WITHOUT COMPLICATION, WITHOUT LONG-TERM CURRENT USE OF INSULIN (HCC): ICD-10-CM

## 2023-08-29 NOTE — TELEPHONE ENCOUNTER
Shari Mckeon is requesting a refill on the following medication(s):  Requested Prescriptions     Pending Prescriptions Disp Refills    OZEMPIC, 1 MG/DOSE, 4 MG/3ML SOPN [Pharmacy Med Name: Ozempic (1 MG/DOSE) 4 MG/3ML Subcutaneous Solution Pen-injector] 3 mL 0     Sig: INJECT 1 MG SUBCUTANEOUSLY ONCE A WEEK       Last Visit Date (If Applicable):  6/8/6678    Next Visit Date:    11/8/2023

## 2023-08-30 RX ORDER — SEMAGLUTIDE 1.34 MG/ML
INJECTION, SOLUTION SUBCUTANEOUS
Qty: 3 ML | Refills: 3 | Status: SHIPPED | OUTPATIENT
Start: 2023-08-30

## 2023-09-12 DIAGNOSIS — Z12.31 ENCOUNTER FOR SCREENING MAMMOGRAM FOR MALIGNANT NEOPLASM OF BREAST: ICD-10-CM

## 2023-09-13 ENCOUNTER — PATIENT MESSAGE (OUTPATIENT)
Dept: FAMILY MEDICINE CLINIC | Age: 58
End: 2023-09-13

## 2023-09-13 DIAGNOSIS — E11.9 TYPE 2 DIABETES MELLITUS WITHOUT COMPLICATION, WITHOUT LONG-TERM CURRENT USE OF INSULIN (HCC): Primary | ICD-10-CM

## 2023-09-13 NOTE — TELEPHONE ENCOUNTER
From: Gemini Burgos  To: Dr. Natahn Nest: 9/13/2023 4:39 PM EDT  Subject: Diabetes Sensor    I was wondering if I could have a prescription for a diabetes sensor. I have a free trial and need a prescription in order to get one. The free trial is through [employer].    Thanks, Enma Roberts

## 2023-10-15 DIAGNOSIS — E78.2 MIXED HYPERLIPIDEMIA: ICD-10-CM

## 2023-10-16 RX ORDER — ROSUVASTATIN CALCIUM 10 MG/1
10 TABLET, COATED ORAL NIGHTLY
Qty: 30 TABLET | Refills: 5 | Status: SHIPPED | OUTPATIENT
Start: 2023-10-16

## 2023-10-16 NOTE — TELEPHONE ENCOUNTER
Pily Ramirez is requesting a refill on the following medication(s):  Requested Prescriptions     Pending Prescriptions Disp Refills    rosuvastatin (CRESTOR) 10 MG tablet [Pharmacy Med Name: Rosuvastatin Calcium 10 MG Oral Tablet] 30 tablet 0     Sig: Take 1 tablet by mouth nightly       Last Visit Date (If Applicable):  4/9/9105    Next Visit Date:    11/8/2023

## 2023-11-08 ENCOUNTER — OFFICE VISIT (OUTPATIENT)
Dept: FAMILY MEDICINE CLINIC | Age: 58
End: 2023-11-08

## 2023-11-08 VITALS
SYSTOLIC BLOOD PRESSURE: 122 MMHG | WEIGHT: 197 LBS | OXYGEN SATURATION: 97 % | BODY MASS INDEX: 32.78 KG/M2 | HEART RATE: 87 BPM | DIASTOLIC BLOOD PRESSURE: 74 MMHG

## 2023-11-08 DIAGNOSIS — L03.114 LEFT ARM CELLULITIS: ICD-10-CM

## 2023-11-08 DIAGNOSIS — F33.9 RECURRENT MAJOR DEPRESSION RESISTANT TO TREATMENT (HCC): ICD-10-CM

## 2023-11-08 DIAGNOSIS — Z51.81 MEDICATION MONITORING ENCOUNTER: ICD-10-CM

## 2023-11-08 DIAGNOSIS — Z23 NEED FOR INFLUENZA VACCINATION: ICD-10-CM

## 2023-11-08 DIAGNOSIS — I10 PRIMARY HYPERTENSION: ICD-10-CM

## 2023-11-08 DIAGNOSIS — E78.2 MIXED HYPERLIPIDEMIA: ICD-10-CM

## 2023-11-08 DIAGNOSIS — E11.9 TYPE 2 DIABETES MELLITUS WITHOUT COMPLICATION, WITHOUT LONG-TERM CURRENT USE OF INSULIN (HCC): Primary | ICD-10-CM

## 2023-11-08 DIAGNOSIS — E66.01 SEVERE OBESITY (BMI 35.0-39.9) WITH COMORBIDITY (HCC): ICD-10-CM

## 2023-11-08 LAB — HBA1C MFR BLD: 5.4 %

## 2023-11-08 RX ORDER — LITHIUM CARBONATE 300 MG/1
300 TABLET, FILM COATED, EXTENDED RELEASE ORAL
COMMUNITY
Start: 2023-11-07

## 2023-11-08 RX ORDER — CEPHALEXIN 500 MG/1
1000 CAPSULE ORAL 2 TIMES DAILY
Qty: 28 CAPSULE | Refills: 0 | Status: SHIPPED | OUTPATIENT
Start: 2023-11-08 | End: 2023-11-15

## 2023-11-08 RX ORDER — LURASIDONE HYDROCHLORIDE 60 MG/1
TABLET, FILM COATED ORAL
COMMUNITY
Start: 2023-10-30

## 2023-11-08 RX ORDER — DESVENLAFAXINE 100 MG/1
100 TABLET, EXTENDED RELEASE ORAL EVERY MORNING
COMMUNITY
Start: 2023-10-22

## 2023-12-12 DIAGNOSIS — E11.9 TYPE 2 DIABETES MELLITUS WITHOUT COMPLICATION, WITHOUT LONG-TERM CURRENT USE OF INSULIN (HCC): ICD-10-CM

## 2023-12-12 RX ORDER — SEMAGLUTIDE 1.34 MG/ML
INJECTION, SOLUTION SUBCUTANEOUS
Qty: 3 ML | Refills: 2 | Status: SHIPPED | OUTPATIENT
Start: 2023-12-12

## 2023-12-12 NOTE — TELEPHONE ENCOUNTER
Teo Summers is requesting a refill on the following medication(s):  Requested Prescriptions     Pending Prescriptions Disp Refills    OZEMPIC, 1 MG/DOSE, 4 MG/3ML SOPN [Pharmacy Med Name: Ozempic (1 MG/DOSE) 4 MG/3ML Subcutaneous Solution Pen-injector] 3 mL 2     Sig: INJECT 1 MG SUBCUTANEOUSLY ONCE A WEEK       Last Visit Date (If Applicable):  69/5/9442    Next Visit Date:    5/10/2024

## 2023-12-28 ENCOUNTER — OFFICE VISIT (OUTPATIENT)
Dept: FAMILY MEDICINE CLINIC | Age: 58
End: 2023-12-28
Payer: COMMERCIAL

## 2023-12-28 VITALS
OXYGEN SATURATION: 98 % | HEIGHT: 65 IN | TEMPERATURE: 98.9 F | BODY MASS INDEX: 32.99 KG/M2 | DIASTOLIC BLOOD PRESSURE: 96 MMHG | HEART RATE: 86 BPM | WEIGHT: 198 LBS | SYSTOLIC BLOOD PRESSURE: 130 MMHG

## 2023-12-28 DIAGNOSIS — F32.9 DEPRESSION RESISTANT TO TREATMENT: Primary | ICD-10-CM

## 2023-12-28 DIAGNOSIS — R11.0 NAUSEA: ICD-10-CM

## 2023-12-28 PROCEDURE — 3080F DIAST BP >= 90 MM HG: CPT | Performed by: NURSE PRACTITIONER

## 2023-12-28 PROCEDURE — 99214 OFFICE O/P EST MOD 30 MIN: CPT | Performed by: NURSE PRACTITIONER

## 2023-12-28 PROCEDURE — 3075F SYST BP GE 130 - 139MM HG: CPT | Performed by: NURSE PRACTITIONER

## 2023-12-28 RX ORDER — ONDANSETRON 4 MG/1
4 TABLET, ORALLY DISINTEGRATING ORAL 3 TIMES DAILY PRN
Qty: 21 TABLET | Refills: 0 | Status: SHIPPED | OUTPATIENT
Start: 2023-12-28

## 2023-12-28 NOTE — PROGRESS NOTES
to harm herself.  I reviewed with her symptoms of serotonin syndrome and although she has symptoms of nausea she has not experienced any vomiting or diarrhea.  She has had no significant loss of coordination.  She initially stopped her medications for her right leg twitching and that has not changed in a few days since she has stopped her medicines either.  There is been no noticeable loss of coordination or abnormal eye movements.  No noticeable tardive dyskinesia. Her temperature has been normal and she denies heart palpitations or fast heart rate.  - External Referral To Psychiatry    2. Nausea  Discussed with patient that her nausea is likely due to with draw from her antidepressant therapy.  I strongly encouraged her to never abruptly stop taking her medications as this could likely cause serotonin syndrome.  She was given prescription for Zofran to help manage the symptoms of nausea and verbalized understanding of the harmful effects that suddenly stopping her medications could cause.  - ondansetron (ZOFRAN-ODT) 4 MG disintegrating tablet; Take 1 tablet by mouth 3 times daily as needed for Nausea or Vomiting  Dispense: 21 tablet; Refill: 0      Resume you medications. May hold off on taking the Lithium for now. Call and schedule appointment with your current Psychiatrist or call and schedule with a new provider that you can speak to about your medications.     Call the Suicide Hotline anytime at [phone]    Dr. Danitza Sykes  [address] [id number]  Make an Appointment  [phone]    Subjective:   Patient presents today with symptoms of nausea, headache, odd buzzing within her head, irritability and feelings of feeling spacey.  She reports that as of Christmas she stopped taking all of her antidepressant medications.  She states that she was started on lithium a couple months ago and feels that she has been gaining weight and not feeling herself for some time.  She currently sees a psychiatrist by

## 2023-12-28 NOTE — PATIENT INSTRUCTIONS
Resume you medications. May hold off on taking the Lithium for now. Call and schedule appointment with your current Psychiatrist or call and schedule with a new provider that you can speak to about your medications.     Call the Suicide Hotline anytime at 470    Dr. Danitza Sykes  [address] [place] [address]  Make an Appointment  [phone]

## 2024-01-02 ASSESSMENT — ENCOUNTER SYMPTOMS
NAUSEA: 1
SHORTNESS OF BREATH: 0
VOMITING: 0
COUGH: 0

## 2024-02-22 ENCOUNTER — PATIENT MESSAGE (OUTPATIENT)
Dept: FAMILY MEDICINE CLINIC | Age: 59
End: 2024-02-22

## 2024-02-22 DIAGNOSIS — E11.9 TYPE 2 DIABETES MELLITUS WITHOUT COMPLICATION, WITHOUT LONG-TERM CURRENT USE OF INSULIN (HCC): ICD-10-CM

## 2024-02-22 NOTE — TELEPHONE ENCOUNTER
From: Mary Mcguire  To: Dr. Shelly Lopez  Sent: 2/22/2024 7:31 AM EST  Subject: Ozempic    I’m not sure if the Ozempic is helping with the food cravings anymore. I started binging on food again. It does help with the blood sugars. Didn’t know if it could be increased to see if that would help.

## 2024-02-28 ENCOUNTER — OFFICE VISIT (OUTPATIENT)
Dept: FAMILY MEDICINE CLINIC | Age: 59
End: 2024-02-28
Payer: COMMERCIAL

## 2024-02-28 VITALS
RESPIRATION RATE: 14 BRPM | DIASTOLIC BLOOD PRESSURE: 86 MMHG | HEIGHT: 65 IN | TEMPERATURE: 98.4 F | SYSTOLIC BLOOD PRESSURE: 132 MMHG | OXYGEN SATURATION: 95 % | BODY MASS INDEX: 34.99 KG/M2 | WEIGHT: 210 LBS | HEART RATE: 86 BPM

## 2024-02-28 DIAGNOSIS — R05.1 ACUTE COUGH: ICD-10-CM

## 2024-02-28 DIAGNOSIS — J01.90 ACUTE BACTERIAL SINUSITIS: Primary | ICD-10-CM

## 2024-02-28 DIAGNOSIS — B96.89 ACUTE BACTERIAL SINUSITIS: Primary | ICD-10-CM

## 2024-02-28 PROCEDURE — 3075F SYST BP GE 130 - 139MM HG: CPT | Performed by: NURSE PRACTITIONER

## 2024-02-28 PROCEDURE — 99213 OFFICE O/P EST LOW 20 MIN: CPT | Performed by: NURSE PRACTITIONER

## 2024-02-28 PROCEDURE — 3079F DIAST BP 80-89 MM HG: CPT | Performed by: NURSE PRACTITIONER

## 2024-02-28 RX ORDER — AMOXICILLIN AND CLAVULANATE POTASSIUM 875; 125 MG/1; MG/1
1 TABLET, FILM COATED ORAL 2 TIMES DAILY
Qty: 20 TABLET | Refills: 0 | Status: SHIPPED | OUTPATIENT
Start: 2024-02-28 | End: 2024-03-09

## 2024-02-28 RX ORDER — BENZONATATE 100 MG/1
100 CAPSULE ORAL 3 TIMES DAILY PRN
Qty: 30 CAPSULE | Refills: 0 | Status: SHIPPED | OUTPATIENT
Start: 2024-02-28 | End: 2024-03-09

## 2024-02-28 ASSESSMENT — ENCOUNTER SYMPTOMS
COUGH: 1
ALLERGIC/IMMUNOLOGIC NEGATIVE: 1
NAUSEA: 0
DIARRHEA: 0
SINUS PRESSURE: 1
SORE THROAT: 1
VOMITING: 0
EYES NEGATIVE: 1
ABDOMINAL PAIN: 0
CONSTIPATION: 0
RHINORRHEA: 1

## 2024-02-28 ASSESSMENT — PATIENT HEALTH QUESTIONNAIRE - PHQ9: DEPRESSION UNABLE TO ASSESS: PT REFUSES

## 2024-02-28 NOTE — PROGRESS NOTES
equal, round, and reactive to light.   Cardiovascular:      Rate and Rhythm: Normal rate and regular rhythm.      Pulses: Normal pulses.      Heart sounds: Normal heart sounds.   Pulmonary:      Effort: Pulmonary effort is normal. No respiratory distress.      Breath sounds: Normal breath sounds. No decreased air movement. No decreased breath sounds, wheezing, rhonchi or rales.   Musculoskeletal:         General: Normal range of motion.      Cervical back: Normal range of motion.   Lymphadenopathy:      Cervical: No cervical adenopathy.      Right cervical: No superficial or posterior cervical adenopathy.     Left cervical: No superficial or posterior cervical adenopathy.      Upper Body:      Right upper body: No supraclavicular adenopathy.      Left upper body: No supraclavicular adenopathy.   Skin:     General: Skin is warm and dry.      Capillary Refill: Capillary refill takes less than 2 seconds.   Neurological:      General: No focal deficit present.      Mental Status: She is alert and oriented to person, place, and time. Mental status is at baseline.   Psychiatric:         Mood and Affect: Mood normal.         Behavior: Behavior normal.           Discussed exam, POCT findings, plan of care, and follow-up at length with patient and/or their caregiver.  Reviewed all prescribed and recommended medications, administration and side effects. Encouraged patient to follow up with PCP or return to the clinic for no improvement and or worsening of symptoms. All questions were addressed and answered with verbalization of understanding. The patient and/or the caregiver was agreeable with the plan.     Electronically signed by IRENE Salazar CNP on 2/28/2024 at 9:55 AM

## 2024-04-10 ENCOUNTER — PATIENT MESSAGE (OUTPATIENT)
Dept: FAMILY MEDICINE CLINIC | Age: 59
End: 2024-04-10

## 2024-04-10 DIAGNOSIS — R53.82 CHRONIC FATIGUE: Primary | ICD-10-CM

## 2024-04-10 NOTE — TELEPHONE ENCOUNTER
From: Mary Mcguire  To: Dr. Shelly Lopez  Sent: 4/10/2024 6:23 AM EDT  Subject: Blood work     I need to have blood work done this month because I have an appointment with you next month. Pretty Dumont who I see for my medication would like to know If you can also add vitamin D to my blood work?

## 2024-04-11 DIAGNOSIS — E78.2 MIXED HYPERLIPIDEMIA: ICD-10-CM

## 2024-04-11 RX ORDER — ROSUVASTATIN CALCIUM 10 MG/1
10 TABLET, COATED ORAL NIGHTLY
Qty: 30 TABLET | Refills: 3 | Status: SHIPPED | OUTPATIENT
Start: 2024-04-11

## 2024-04-11 NOTE — TELEPHONE ENCOUNTER
Mary Mcguire is requesting a refill on the following medication(s):  Requested Prescriptions     Pending Prescriptions Disp Refills    rosuvastatin (CRESTOR) 10 MG tablet [Pharmacy Med Name: Rosuvastatin Calcium 10 MG Oral Tablet] 30 tablet 3     Sig: Take 1 tablet by mouth nightly       Last Visit Date (If Applicable):  11/8/2023    Next Visit Date:    5/10/2024

## 2024-04-16 LAB
ALBUMIN/GLOBULIN RATIO: 1.5 G/DL
ALBUMIN: 4.4 G/DL (ref 3.5–5)
ALP BLD-CCNC: 58 UNITS/L (ref 38–126)
ALT SERPL-CCNC: 22 UNITS/L (ref 4–35)
ANION GAP SERPL CALCULATED.3IONS-SCNC: 7.4 MMOL/L (ref 3–11)
AST SERPL-CCNC: 29 UNITS/L (ref 14–36)
BASOPHILS %: 0.87 (ref 0–3)
BASOPHILS ABSOLUTE: 0.05 (ref 0–0.3)
BILIRUB SERPL-MCNC: 0.8 MG/DL (ref 0.2–1.3)
BUN BLDV-MCNC: 23 MG/DL (ref 7–17)
CALCIUM SERPL-MCNC: 9.6 MG/DL (ref 8.4–10.2)
CHLORIDE BLD-SCNC: 103 MMOL/L (ref 98–120)
CHOLESTEROL/HDL RATIO: 3.76 RATIO (ref 0–4.5)
CHOLESTEROL: 282 MG/DL (ref 50–200)
CO2: 29 MMOL/L (ref 22–31)
CREAT SERPL-MCNC: 0.9 MG/DL (ref 0.5–1)
CREATININE, RANDOM URINE: 203.1 MG/DL (ref 20–370)
EOSINOPHILS %: 2.38 (ref 0–10)
EOSINOPHILS ABSOLUTE: 0.12 (ref 0–1.1)
GFR CALCULATED: > 60
GLOBULIN: 2.9 G/DL
GLUCOSE: 89 MG/DL (ref 65–105)
HCT VFR BLD CALC: 42.8 % (ref 37–47)
HDLC SERPL-MCNC: 75 MG/DL (ref 36–68)
HEMOGLOBIN: 13.5 (ref 12–16)
LDL CHOLESTEROL CALCULATED: 184.6 MG/DL (ref 0–160)
LYMPHOCYTE %: 24.21 (ref 20–51.1)
LYMPHOCYTES ABSOLUTE: 1.26 (ref 1–5.5)
MAGNESIUM: 2.1 MG/DL (ref 1.6–2.3)
MCH RBC QN AUTO: 30.1 PG (ref 28.5–32.5)
MCHC RBC AUTO-ENTMCNC: 31.6 G/DL (ref 32–37)
MCV RBC AUTO: 95.5 FL (ref 80–94)
MICROALBUMIN UR-MCNC: 2.5 MG/DL (ref 0–1.7)
MICROALBUMIN/CREAT UR-RTO: 12.3
MONOCYTES %: 7.32 (ref 1.7–9.3)
MONOCYTES ABSOLUTE: 0.38 (ref 0.1–1)
NEUTROPHILS ABSOLUTE: 3.4 (ref 2–8.1)
NEUTROPHILS RELATIVE PERCENT: 65.21 (ref 42.2–75.2)
PDW BLD-RTO: 11.7 % (ref 8.5–15.5)
PLATELET # BLD: 341.4 THOU/MM3 (ref 130–400)
POTASSIUM SERPL-SCNC: 4.6 MMOL/L (ref 3.6–5)
RBC: 4.49 M/UL (ref 4.2–5.4)
SODIUM BLD-SCNC: 139 MMOL/L (ref 135–145)
TOTAL PROTEIN: 7.3 G/DL (ref 6.3–8.2)
TRIGL SERPL-MCNC: 112 MG/DL (ref 10–250)
TSH REFLEX FT4: 1.15 MIU/ML (ref 0.49–4.67)
VITAMIN B-12: 891 PG/ML (ref 239–931)
VITAMIN D 25-HYDROXY: 32.3 NG/ML (ref 30–100)
VLDLC SERPL CALC-MCNC: 22 MG/DL (ref 0–50)
WBC: 5.2 THOU/ML3 (ref 4.8–10.8)

## 2024-05-10 ENCOUNTER — OFFICE VISIT (OUTPATIENT)
Dept: FAMILY MEDICINE CLINIC | Age: 59
End: 2024-05-10
Payer: COMMERCIAL

## 2024-05-10 VITALS
DIASTOLIC BLOOD PRESSURE: 80 MMHG | BODY MASS INDEX: 32.95 KG/M2 | OXYGEN SATURATION: 96 % | HEART RATE: 92 BPM | WEIGHT: 198 LBS | SYSTOLIC BLOOD PRESSURE: 124 MMHG

## 2024-05-10 DIAGNOSIS — E11.9 TYPE 2 DIABETES MELLITUS WITHOUT COMPLICATION, WITHOUT LONG-TERM CURRENT USE OF INSULIN (HCC): ICD-10-CM

## 2024-05-10 DIAGNOSIS — F33.9 RECURRENT MAJOR DEPRESSION RESISTANT TO TREATMENT (HCC): ICD-10-CM

## 2024-05-10 DIAGNOSIS — F50.2 BULIMIA: ICD-10-CM

## 2024-05-10 DIAGNOSIS — Z12.4 SCREENING FOR CERVICAL CANCER: ICD-10-CM

## 2024-05-10 DIAGNOSIS — Z00.00 WELL ADULT EXAM: Primary | ICD-10-CM

## 2024-05-10 DIAGNOSIS — Z12.31 VISIT FOR SCREENING MAMMOGRAM: ICD-10-CM

## 2024-05-10 DIAGNOSIS — E78.2 MIXED HYPERLIPIDEMIA: ICD-10-CM

## 2024-05-10 LAB — HBA1C MFR BLD: 5.4 %

## 2024-05-10 PROCEDURE — 3079F DIAST BP 80-89 MM HG: CPT | Performed by: FAMILY MEDICINE

## 2024-05-10 PROCEDURE — 83036 HEMOGLOBIN GLYCOSYLATED A1C: CPT | Performed by: FAMILY MEDICINE

## 2024-05-10 PROCEDURE — 3074F SYST BP LT 130 MM HG: CPT | Performed by: FAMILY MEDICINE

## 2024-05-10 PROCEDURE — 99396 PREV VISIT EST AGE 40-64: CPT | Performed by: FAMILY MEDICINE

## 2024-05-10 RX ORDER — ROSUVASTATIN CALCIUM 10 MG/1
10 TABLET, COATED ORAL NIGHTLY
Qty: 30 TABLET | Refills: 5 | Status: SHIPPED | OUTPATIENT
Start: 2024-05-10

## 2024-05-10 RX ORDER — LURASIDONE HYDROCHLORIDE 60 MG/1
60 TABLET, FILM COATED ORAL
Qty: 30 TABLET | Refills: 5
Start: 2024-05-10

## 2024-05-10 SDOH — ECONOMIC STABILITY: FOOD INSECURITY: WITHIN THE PAST 12 MONTHS, YOU WORRIED THAT YOUR FOOD WOULD RUN OUT BEFORE YOU GOT MONEY TO BUY MORE.: NEVER TRUE

## 2024-05-10 SDOH — ECONOMIC STABILITY: FOOD INSECURITY: WITHIN THE PAST 12 MONTHS, THE FOOD YOU BOUGHT JUST DIDN'T LAST AND YOU DIDN'T HAVE MONEY TO GET MORE.: NEVER TRUE

## 2024-05-10 SDOH — ECONOMIC STABILITY: INCOME INSECURITY: HOW HARD IS IT FOR YOU TO PAY FOR THE VERY BASICS LIKE FOOD, HOUSING, MEDICAL CARE, AND HEATING?: NOT HARD AT ALL

## 2024-05-10 ASSESSMENT — PATIENT HEALTH QUESTIONNAIRE - PHQ9
2. FEELING DOWN, DEPRESSED OR HOPELESS: MORE THAN HALF THE DAYS
7. TROUBLE CONCENTRATING ON THINGS, SUCH AS READING THE NEWSPAPER OR WATCHING TELEVISION: MORE THAN HALF THE DAYS
5. POOR APPETITE OR OVEREATING: NOT AT ALL
1. LITTLE INTEREST OR PLEASURE IN DOING THINGS: NEARLY EVERY DAY
10. IF YOU CHECKED OFF ANY PROBLEMS, HOW DIFFICULT HAVE THESE PROBLEMS MADE IT FOR YOU TO DO YOUR WORK, TAKE CARE OF THINGS AT HOME, OR GET ALONG WITH OTHER PEOPLE: SOMEWHAT DIFFICULT
6. FEELING BAD ABOUT YOURSELF - OR THAT YOU ARE A FAILURE OR HAVE LET YOURSELF OR YOUR FAMILY DOWN: NOT AT ALL
4. FEELING TIRED OR HAVING LITTLE ENERGY: SEVERAL DAYS
SUM OF ALL RESPONSES TO PHQ QUESTIONS 1-9: 10
3. TROUBLE FALLING OR STAYING ASLEEP: SEVERAL DAYS
SUM OF ALL RESPONSES TO PHQ QUESTIONS 1-9: 10
SUM OF ALL RESPONSES TO PHQ QUESTIONS 1-9: 10
9. THOUGHTS THAT YOU WOULD BE BETTER OFF DEAD, OR OF HURTING YOURSELF: SEVERAL DAYS
SUM OF ALL RESPONSES TO PHQ QUESTIONS 1-9: 9
SUM OF ALL RESPONSES TO PHQ9 QUESTIONS 1 & 2: 5
8. MOVING OR SPEAKING SO SLOWLY THAT OTHER PEOPLE COULD HAVE NOTICED. OR THE OPPOSITE, BEING SO FIGETY OR RESTLESS THAT YOU HAVE BEEN MOVING AROUND A LOT MORE THAN USUAL: NOT AT ALL

## 2024-05-10 ASSESSMENT — COLUMBIA-SUICIDE SEVERITY RATING SCALE - C-SSRS
1. WITHIN THE PAST MONTH, HAVE YOU WISHED YOU WERE DEAD OR WISHED YOU COULD GO TO SLEEP AND NOT WAKE UP?: YES
6. HAVE YOU EVER DONE ANYTHING, STARTED TO DO ANYTHING, OR PREPARED TO DO ANYTHING TO END YOUR LIFE?: NO
2. HAVE YOU ACTUALLY HAD ANY THOUGHTS OF KILLING YOURSELF?: NO

## 2024-05-10 NOTE — PROGRESS NOTES
assistance of the [employer]  Pap Test Imaging System.  The Pap smear is a screening test primarily for squamous epithelial  lesions, which is subject to both false negative and false positive  results. Your patient should be reminded to consult you immediately if  she experiences any suspicious signs or symptoms, regardless of her  Pap smear result.  GYNECOLOGIC CYTOLOGY REPORT  Patient Name: AMANDA SANTIAGO  [facility] Rec: [id number]  [facility] PATHOLOGISTS [facility] [place]  ANATOMIC PATHOLOGY  [address] [place] [address].  [place], [place] [address] [phone]  Fax: [phone]     Orders Only on 04/16/2024   Component Date Value Ref Range Status    WBC 04/16/2024 5.2  4.8 - 10.8 Thou/mL3 Final    RBC 04/16/2024 4.49  4.20 - 5.40 M/uL Final    Hemoglobin 04/16/2024 13.5  12.0 - 16.0 Final    Hematocrit 04/16/2024 42.8  37.0 - 47.0 % Final    MCV 04/16/2024 95.5 (H)  80.0 - 94.0 fl Final    MCH 04/16/2024 30.1  28.5 - 32.5 pg Final    MCHC 04/16/2024 31.6 (L)  32.0 - 37.0 g/dL Final    RDW 04/16/2024 11.7  8.5 - 15.5 % Final    Platelets 04/16/2024 341.4  130 - 400 Thou/mm3 Final    Neutrophils % 04/16/2024 65.21  42.2 - 75.2 Final    Lymphocyte % 04/16/2024 24.21  20 - 51.1 Final    Monocytes % 04/16/2024 7.322  1.7 - 9.3 Final    Eosinophils % 04/16/2024 2.385  0.0 - 10.0 Final    Basophils % 04/16/2024 0.8739  0.0 - 3.0 Final    Neutrophils Absolute 04/16/2024 3.404  2.0 - 8.1 Final    Lymphocytes Absolute 04/16/2024 1.264  1.0 - 5.5 Final    Monocytes Absolute 04/16/2024 0.3822  0.1 - 1.0 Final    Eosinophils Absolute 04/16/2024 0.1245  0.0 - 1.1 Final    Basophils Absolute 04/16/2024 0.0456  0.0 - 0.3 Final    Creatinine, Random Urine 04/16/2024 203.1  20 - 370 mg/dL Final    Microalb, Ur 04/16/2024 2.5 (H)  0.0 - 1.7 mg/dl Final    Microalb/Creat Ratio 04/16/2024 12.30   Final    Sodium 04/16/2024 139  135 - 145 mmol/L Final    Potassium 04/16/2024 4.6  3.6 - 5.0 mmol/L Final    Chloride

## 2024-05-18 LAB — GYNECOLOGY CYTOLOGY REPORT: NORMAL

## 2024-05-19 PROBLEM — N95.0 POSTMENOPAUSAL BLEEDING: Status: RESOLVED | Noted: 2018-02-27 | Resolved: 2024-05-19

## 2024-05-19 PROBLEM — M25.559 PAIN IN JOINT, PELVIC REGION AND THIGH: Status: RESOLVED | Noted: 2019-10-09 | Resolved: 2024-05-19

## 2024-05-19 PROBLEM — M76.899 ENTHESOPATHY OF HIP REGION: Status: RESOLVED | Noted: 2019-10-09 | Resolved: 2024-05-19

## 2024-05-19 PROBLEM — E66.01 SEVERE OBESITY (BMI 35.0-39.9) WITH COMORBIDITY (HCC): Status: RESOLVED | Noted: 2023-05-08 | Resolved: 2024-05-19

## 2024-07-19 DIAGNOSIS — E11.9 TYPE 2 DIABETES MELLITUS WITHOUT COMPLICATION, WITHOUT LONG-TERM CURRENT USE OF INSULIN (HCC): ICD-10-CM

## 2024-07-19 RX ORDER — SEMAGLUTIDE 2.68 MG/ML
INJECTION, SOLUTION SUBCUTANEOUS
Qty: 3 ML | Refills: 5 | Status: SHIPPED | OUTPATIENT
Start: 2024-07-19

## 2024-07-19 NOTE — TELEPHONE ENCOUNTER
Mary Mcguire is requesting a refill on the following medication(s):  Requested Prescriptions     Pending Prescriptions Disp Refills    OZEMPIC, 2 MG/DOSE, 8 MG/3ML SOPN sc injection [Pharmacy Med Name: Ozempic (2 MG/DOSE) 8 MG/3ML Subcutaneous Solution Pen-injector] 3 mL 0     Sig: INJECT 2 MG SUBCUTANEOUSLY ONCE A WEEK       Last Visit Date (If Applicable):  5/10/2024    Next Visit Date:    11/8/2024

## 2024-07-22 DIAGNOSIS — E11.9 TYPE 2 DIABETES MELLITUS WITHOUT COMPLICATION, WITHOUT LONG-TERM CURRENT USE OF INSULIN (HCC): ICD-10-CM

## 2024-07-22 NOTE — TELEPHONE ENCOUNTER
Mary Mcguire is requesting a refill on the following medication(s):  Requested Prescriptions     Pending Prescriptions Disp Refills    Continuous Glucose Sensor (FREESTYLE MABLE 2 SENSOR) MISC 2 each 11     Si each by Does not apply route 4 times daily       Last Visit Date (If Applicable):  5/10/2024    Next Visit Date:    2024

## 2024-11-08 ENCOUNTER — OFFICE VISIT (OUTPATIENT)
Dept: FAMILY MEDICINE CLINIC | Age: 59
End: 2024-11-08

## 2024-11-08 VITALS
HEART RATE: 76 BPM | SYSTOLIC BLOOD PRESSURE: 130 MMHG | BODY MASS INDEX: 32.95 KG/M2 | OXYGEN SATURATION: 99 % | DIASTOLIC BLOOD PRESSURE: 78 MMHG | WEIGHT: 198 LBS

## 2024-11-08 DIAGNOSIS — E11.9 TYPE 2 DIABETES MELLITUS WITHOUT COMPLICATION, WITHOUT LONG-TERM CURRENT USE OF INSULIN (HCC): Primary | ICD-10-CM

## 2024-11-08 DIAGNOSIS — I10 PRIMARY HYPERTENSION: ICD-10-CM

## 2024-11-08 DIAGNOSIS — Z23 NEEDS FLU SHOT: ICD-10-CM

## 2024-11-08 DIAGNOSIS — E78.2 MIXED HYPERLIPIDEMIA: ICD-10-CM

## 2024-11-08 DIAGNOSIS — F50.22 MODERATE BULIMIA NERVOSA: ICD-10-CM

## 2024-11-08 DIAGNOSIS — F41.8 DEPRESSION WITH ANXIETY: ICD-10-CM

## 2024-11-08 LAB — HBA1C MFR BLD: 5.3 %

## 2024-11-08 RX ORDER — ROSUVASTATIN CALCIUM 10 MG/1
10 TABLET, COATED ORAL NIGHTLY
Qty: 90 TABLET | Refills: 5 | Status: SHIPPED | OUTPATIENT
Start: 2024-11-08

## 2024-11-08 RX ORDER — OMEPRAZOLE 40 MG/1
40 CAPSULE, DELAYED RELEASE ORAL
Qty: 90 CAPSULE | Refills: 3 | Status: SHIPPED | OUTPATIENT
Start: 2024-11-08

## 2024-11-08 RX ORDER — TOPIRAMATE 25 MG/1
75 TABLET, FILM COATED ORAL NIGHTLY
Qty: 90 TABLET | Refills: 3 | Status: SHIPPED | OUTPATIENT
Start: 2024-11-08

## 2024-11-08 NOTE — ASSESSMENT & PLAN NOTE
Orders:    omeprazole (PRILOSEC) 40 MG delayed release capsule; Take 1 capsule by mouth every morning (before breakfast)    topiramate (TOPAMAX) 25 MG tablet; Take 3 tablets by mouth nightly

## 2024-11-08 NOTE — PATIENT INSTRUCTIONS
This is a preventative medication.  Start on the topiramate at one tablet daily at night for 2 weeks.  Then start with 2 tablets at night for 2 weeks then 3 tablets at night..  Hold on this 75 mg dose until your follow up appointment.  Side effects most commonly seen are unusual taste, hand and finger paraesthesia (funny feeling/ tingling) and word finding difficulty.  Take at night to help prevent this and also drink a lot of water.  There is also a slight increased risk of kidney stones.

## 2024-11-08 NOTE — ASSESSMENT & PLAN NOTE
Orders:    POCT glycosylated hemoglobin (Hb A1C)    HM DIABETES FOOT EXAM    Comprehensive Metabolic Panel; Future    Lipid Panel; Future

## 2024-11-08 NOTE — ASSESSMENT & PLAN NOTE
Orders:    Lipid Panel; Future    rosuvastatin (CRESTOR) 10 MG tablet; Take 1 tablet by mouth nightly

## 2024-11-08 NOTE — PROGRESS NOTES
Have you had an allergic reaction to the flu (influenza) shot? no  Are you allergic to eggs or any component of the flu vaccine? no  Do you have a history of Guillain-Phoenix Syndrome (GBS), which is paralysis after receiving the flu vaccine? no  Are you feeling well today? yes  Flu vaccine given as ordered.  Patient tolerated it well.  No questions re: VIS information.    After obtaining consent, and per orders of Shelly Lopez MD, injection of FLU VACCINE given in Left deltoid by Ynes Colorado MA. Patient tolerated well.  Patient instructed to remain in clinic for 20 minutes afterwards, and to report any adverse reaction immediately.   
medications, diet andexercise.  Patient agreed with treatment plan. Follow up as directed.     The patient (or guardian, if applicable) and other individuals in attendance with the patient were advised that Artificial Intelligence will be utilized during this visit to record, process the conversation to generate a clinical note, and support improvement of the AI technology. The patient (or guardian, if applicable) and other individuals in attendance at the appointment consented to the use of AI, including the recording.                   Electronically signed by Shelly Lopez MD on 11/8/2024

## 2024-12-28 DIAGNOSIS — E11.9 TYPE 2 DIABETES MELLITUS WITHOUT COMPLICATION, WITHOUT LONG-TERM CURRENT USE OF INSULIN (HCC): ICD-10-CM

## 2024-12-31 RX ORDER — SEMAGLUTIDE 2.68 MG/ML
INJECTION, SOLUTION SUBCUTANEOUS
Qty: 3 ML | Refills: 3 | Status: SHIPPED | OUTPATIENT
Start: 2024-12-31

## 2024-12-31 NOTE — TELEPHONE ENCOUNTER
Mary Mcguire is requesting a refill on the following medication(s):  Requested Prescriptions     Pending Prescriptions Disp Refills    OZEMPIC, 2 MG/DOSE, 8 MG/3ML SOPN sc injection [Pharmacy Med Name: Ozempic (2 MG/DOSE) 8 MG/3ML Subcutaneous Solution Pen-injector] 3 mL 0     Sig: INJECT 2 MG SUBCUTANEOUSLY ONCE A WEEK       Last Visit Date (If Applicable):  11/8/2024    Next Visit Date:    5/9/2025

## 2025-02-27 ENCOUNTER — TELEPHONE (OUTPATIENT)
Dept: FAMILY MEDICINE CLINIC | Age: 60
End: 2025-02-27

## 2025-02-27 DIAGNOSIS — F50.22 MODERATE BULIMIA NERVOSA: ICD-10-CM

## 2025-02-27 RX ORDER — TOPIRAMATE 25 MG/1
75 TABLET, FILM COATED ORAL NIGHTLY
Qty: 90 TABLET | Refills: 3 | Status: SHIPPED | OUTPATIENT
Start: 2025-02-27

## 2025-02-27 NOTE — TELEPHONE ENCOUNTER
Mary Mcguire is requesting a refill on the following medication(s):  Requested Prescriptions     Pending Prescriptions Disp Refills    topiramate (TOPAMAX) 25 MG tablet 90 tablet 3     Sig: Take 3 tablets by mouth nightly       Last Visit Date (If Applicable):  11/8/2024    Next Visit Date:    5/9/2025

## 2025-04-16 DIAGNOSIS — E11.9 TYPE 2 DIABETES MELLITUS WITHOUT COMPLICATION, WITHOUT LONG-TERM CURRENT USE OF INSULIN: ICD-10-CM

## 2025-04-16 RX ORDER — SEMAGLUTIDE 2.68 MG/ML
INJECTION, SOLUTION SUBCUTANEOUS
Qty: 3 ML | Refills: 5 | Status: SHIPPED | OUTPATIENT
Start: 2025-04-16

## 2025-04-28 ENCOUNTER — RESULTS FOLLOW-UP (OUTPATIENT)
Dept: FAMILY MEDICINE CLINIC | Age: 60
End: 2025-04-28

## 2025-04-28 LAB
ALBUMIN/GLOBULIN RATIO: 1.9 G/DL
ALBUMIN: 4.3 G/DL (ref 3.5–5)
ALP BLD-CCNC: 58 UNITS/L (ref 38–126)
ALT SERPL-CCNC: 26 UNITS/L (ref 4–35)
ANION GAP SERPL CALCULATED.3IONS-SCNC: 3.9 MMOL/L (ref 3–11)
AST SERPL-CCNC: 26 UNITS/L (ref 14–36)
BILIRUB SERPL-MCNC: 0.6 MG/DL (ref 0.2–1.3)
BUN BLDV-MCNC: 17 MG/DL (ref 7–17)
CALCIUM SERPL-MCNC: 9.3 MG/DL (ref 8.4–10.2)
CHLORIDE BLD-SCNC: 109 MMOL/L (ref 98–120)
CHOLESTEROL, TOTAL: 179 MG/DL (ref 50–200)
CHOLESTEROL/HDL RATIO: 2.16 RATIO (ref 0–4.5)
CO2: 26 MMOL/L (ref 22–31)
CREAT SERPL-MCNC: 0.9 MG/DL (ref 0.5–1)
GFR, ESTIMATED: > 60
GLOBULIN: 2.3 G/DL
GLUCOSE: 82 MG/DL (ref 65–105)
HDLC SERPL-MCNC: 83 MG/DL (ref 36–68)
LDL CHOLESTEROL: 81.4 MG/DL (ref 0–160)
POTASSIUM SERPL-SCNC: 4.3 MMOL/L (ref 3.6–5)
SODIUM BLD-SCNC: 139 MMOL/L (ref 135–145)
TOTAL PROTEIN: 6.6 G/DL (ref 6.3–8.2)
TRIGL SERPL-MCNC: 73 MG/DL (ref 10–250)
VLDLC SERPL CALC-MCNC: 15 MG/DL (ref 0–50)

## 2025-05-09 ENCOUNTER — PATIENT MESSAGE (OUTPATIENT)
Dept: FAMILY MEDICINE CLINIC | Age: 60
End: 2025-05-09

## 2025-05-09 ENCOUNTER — OFFICE VISIT (OUTPATIENT)
Dept: FAMILY MEDICINE CLINIC | Age: 60
End: 2025-05-09

## 2025-05-09 VITALS
BODY MASS INDEX: 32.15 KG/M2 | DIASTOLIC BLOOD PRESSURE: 78 MMHG | SYSTOLIC BLOOD PRESSURE: 134 MMHG | OXYGEN SATURATION: 99 % | WEIGHT: 193 LBS | HEART RATE: 76 BPM | HEIGHT: 65 IN

## 2025-05-09 DIAGNOSIS — E11.9 TYPE 2 DIABETES MELLITUS WITHOUT COMPLICATION, WITHOUT LONG-TERM CURRENT USE OF INSULIN (HCC): Primary | ICD-10-CM

## 2025-05-09 DIAGNOSIS — F33.9 RECURRENT MAJOR DEPRESSION RESISTANT TO TREATMENT: ICD-10-CM

## 2025-05-09 DIAGNOSIS — E78.2 MIXED HYPERLIPIDEMIA: ICD-10-CM

## 2025-05-09 DIAGNOSIS — E11.649 HYPOGLYCEMIA ASSOCIATED WITH TYPE 2 DIABETES MELLITUS (HCC): ICD-10-CM

## 2025-05-09 DIAGNOSIS — Z12.31 ENCOUNTER FOR SCREENING MAMMOGRAM FOR BREAST CANCER: ICD-10-CM

## 2025-05-09 DIAGNOSIS — F50.22: ICD-10-CM

## 2025-05-09 DIAGNOSIS — I10 PRIMARY HYPERTENSION: ICD-10-CM

## 2025-05-09 DIAGNOSIS — F41.8 DEPRESSION WITH ANXIETY: ICD-10-CM

## 2025-05-09 LAB — HBA1C MFR BLD: 5.3 %

## 2025-05-09 RX ORDER — TOPIRAMATE 25 MG/1
100 TABLET, FILM COATED ORAL NIGHTLY
Qty: 90 TABLET | Refills: 3
Start: 2025-05-09 | End: 2025-05-13 | Stop reason: SDUPTHER

## 2025-05-09 RX ORDER — HYDROCHLOROTHIAZIDE 12.5 MG/1
CAPSULE ORAL
Qty: 2 EACH | Refills: 11 | Status: SHIPPED | OUTPATIENT
Start: 2025-05-09

## 2025-05-09 SDOH — ECONOMIC STABILITY: FOOD INSECURITY: WITHIN THE PAST 12 MONTHS, THE FOOD YOU BOUGHT JUST DIDN'T LAST AND YOU DIDN'T HAVE MONEY TO GET MORE.: NEVER TRUE

## 2025-05-09 SDOH — ECONOMIC STABILITY: FOOD INSECURITY: WITHIN THE PAST 12 MONTHS, YOU WORRIED THAT YOUR FOOD WOULD RUN OUT BEFORE YOU GOT MONEY TO BUY MORE.: NEVER TRUE

## 2025-05-09 ASSESSMENT — PATIENT HEALTH QUESTIONNAIRE - PHQ9
7. TROUBLE CONCENTRATING ON THINGS, SUCH AS READING THE NEWSPAPER OR WATCHING TELEVISION: NOT AT ALL
9. THOUGHTS THAT YOU WOULD BE BETTER OFF DEAD, OR OF HURTING YOURSELF: NOT AT ALL
1. LITTLE INTEREST OR PLEASURE IN DOING THINGS: SEVERAL DAYS
8. MOVING OR SPEAKING SO SLOWLY THAT OTHER PEOPLE COULD HAVE NOTICED. OR THE OPPOSITE, BEING SO FIGETY OR RESTLESS THAT YOU HAVE BEEN MOVING AROUND A LOT MORE THAN USUAL: NOT AT ALL
SUM OF ALL RESPONSES TO PHQ QUESTIONS 1-9: 6
SUM OF ALL RESPONSES TO PHQ QUESTIONS 1-9: 6
5. POOR APPETITE OR OVEREATING: SEVERAL DAYS
SUM OF ALL RESPONSES TO PHQ QUESTIONS 1-9: 6
4. FEELING TIRED OR HAVING LITTLE ENERGY: MORE THAN HALF THE DAYS
SUM OF ALL RESPONSES TO PHQ QUESTIONS 1-9: 6
3. TROUBLE FALLING OR STAYING ASLEEP: SEVERAL DAYS
6. FEELING BAD ABOUT YOURSELF - OR THAT YOU ARE A FAILURE OR HAVE LET YOURSELF OR YOUR FAMILY DOWN: NOT AT ALL
2. FEELING DOWN, DEPRESSED OR HOPELESS: SEVERAL DAYS

## 2025-05-09 NOTE — PATIENT INSTRUCTIONS
Increase the topirimate to 100 mg at night for the next 3-4 weeks If not seeing improvement in the binging then let me know and we would increase by adding an additional 50 mg in the  morning.

## 2025-05-09 NOTE — ASSESSMENT & PLAN NOTE
Orders:    POCT glycosylated hemoglobin (Hb A1C)    Albumin/Creatinine Ratio, Urine; Future    Continuous Glucose Sensor (FREESTYLE MABLE 3 PLUS SENSOR) MISC; Change every 2 weeks

## 2025-05-09 NOTE — PROGRESS NOTES
[facility]  [address], [address]  [place] [address]  Dept: [phone]  Dept Fax:[phone]    Mary Mcguire is a 59 y.o. female who presents today for her medical conditions/complaints as notedbelow.        Assessment/Plan:     Assessment & Plan  1. Hypoglycemia.  - Her hypoglycemic episodes are likely due to her underlying diabetes, characterized by high insulin levels and insulin resistance. This condition is expected to improve over time but will not completely resolve, leaving her susceptible to low blood sugar levels.  - Her current medication regimen does not appear to be contributing to these episodes. She is advised to monitor her blood sugar levels before engaging in physical activities such as walking or [employer] classes.  - It is recommended that she consume a snack rich in protein and complex carbohydrates prior to these activities. In the event of a hypoglycemic episode, she can consume simple sugars such as Smarties.  - A prescription for Freestyle Kanika 3+ will be provided to help manage her condition. A referral to [facility] will be made for dietary consultation. She is also advised to carry a granola bar with her during walks and to ensure she has breakfast before morning walks.    2. Depression.  - She has been on a number of different medications including Wellbutrin and Latuda, but is currently only taking Wellbutrin.  - The potential side effects of mirtazapine were discussed, and she was informed that it could help manage her binge eating. The possibility of weight gain with certain medications was also discussed.  - The dosage of mirtazapine will be increased to 100 mg at night for the next 3 to 4 weeks. If this proves ineffective, an additional dose of 25 mg and then 50 mg may be added in the morning.  - A GeneSight test will be ordered to guide future medication adjustments. The results will be shared with Mary Kay Harvey.    3. Forehead cyst.  - A

## 2025-05-13 DIAGNOSIS — F50.22: ICD-10-CM

## 2025-05-13 NOTE — TELEPHONE ENCOUNTER
Mary Mcguire is requesting a refill on the following medication(s):  Requested Prescriptions     Pending Prescriptions Disp Refills    topiramate (TOPAMAX) 25 MG tablet 90 tablet 3     Sig: Take 4 tablets by mouth nightly       Last Visit Date (If Applicable):  5/9/2025    Next Visit Date:    11/14/2025

## 2025-05-14 RX ORDER — TOPIRAMATE 25 MG/1
100 TABLET, FILM COATED ORAL NIGHTLY
Qty: 120 TABLET | Refills: 3 | Status: SHIPPED | OUTPATIENT
Start: 2025-05-14

## 2025-06-01 ENCOUNTER — PATIENT MESSAGE (OUTPATIENT)
Dept: FAMILY MEDICINE CLINIC | Age: 60
End: 2025-06-01

## 2025-06-01 DIAGNOSIS — F50.22: ICD-10-CM

## 2025-06-02 RX ORDER — TOPIRAMATE 50 MG/1
TABLET, FILM COATED ORAL
Qty: 90 TABLET | Refills: 3 | Status: SHIPPED | OUTPATIENT
Start: 2025-06-02

## 2025-06-03 ENCOUNTER — OFFICE VISIT (OUTPATIENT)
Dept: DIABETES SERVICES | Age: 60
End: 2025-06-03
Payer: COMMERCIAL

## 2025-06-03 VITALS
WEIGHT: 190 LBS | DIASTOLIC BLOOD PRESSURE: 86 MMHG | OXYGEN SATURATION: 99 % | SYSTOLIC BLOOD PRESSURE: 128 MMHG | BODY MASS INDEX: 31.65 KG/M2 | HEIGHT: 65 IN | HEART RATE: 76 BPM

## 2025-06-03 DIAGNOSIS — E11.9 TYPE 2 DIABETES MELLITUS WITHOUT COMPLICATION, WITHOUT LONG-TERM CURRENT USE OF INSULIN (HCC): Primary | ICD-10-CM

## 2025-06-03 DIAGNOSIS — I10 PRIMARY HYPERTENSION: ICD-10-CM

## 2025-06-03 DIAGNOSIS — E78.2 MIXED HYPERLIPIDEMIA: ICD-10-CM

## 2025-06-03 PROCEDURE — 99214 OFFICE O/P EST MOD 30 MIN: CPT | Performed by: NURSE PRACTITIONER

## 2025-06-03 PROCEDURE — 3079F DIAST BP 80-89 MM HG: CPT | Performed by: NURSE PRACTITIONER

## 2025-06-03 PROCEDURE — 3044F HG A1C LEVEL LT 7.0%: CPT | Performed by: NURSE PRACTITIONER

## 2025-06-03 PROCEDURE — G2211 COMPLEX E/M VISIT ADD ON: HCPCS | Performed by: NURSE PRACTITIONER

## 2025-06-03 PROCEDURE — 3074F SYST BP LT 130 MM HG: CPT | Performed by: NURSE PRACTITIONER

## 2025-06-03 PROCEDURE — 95251 CONT GLUC MNTR ANALYSIS I&R: CPT | Performed by: NURSE PRACTITIONER

## 2025-06-03 ASSESSMENT — ENCOUNTER SYMPTOMS
RESPIRATORY NEGATIVE: 1
SHORTNESS OF BREATH: 0

## 2025-06-03 NOTE — PROGRESS NOTES
gallops  Gastrointestinal: Soft, no tenderness, no distention, no masses  Extremities: No edema, no cyanosis  Musculoskeletal: No joint or muscular abnormalities noted  Skin: No abnormalities, no rashes or lesions  Physical Exam  Constitutional:       Appearance: Normal appearance. She is well-developed.   HENT:      Head: Normocephalic.   Eyes:      Pupils: Pupils are equal, round, and reactive to light.      Funduscopic exam:     Right eye: No AV nicking or papilledema.         Left eye: No AV nicking or papilledema.   Cardiovascular:      Rate and Rhythm: Normal rate and regular rhythm.   Pulmonary:      Effort: Pulmonary effort is normal.   Skin:     General: Skin is warm and dry.      Comments: Negative for open/nonhealing wounds. Negative for lipohypertrophy.     Neurological:      Mental Status: She is alert and oriented to person, place, and time. Mental status is at baseline.   Psychiatric:         Mood and Affect: Mood normal.         Behavior: Behavior normal.         Thought Content: Thought content normal.         Judgment: Judgment normal.           Results  Labs   - A1c: 06/2025, 5.3%    Diagnostic Testing   - [facility] Mediastyle Kanika report: 06/03/2025, Average glucose is 104 mg/dL, predicted A1c is 5.8%, target range is 98%, and there is 2% low blood sugars  CGM report downloaded and reviewed from the past 2 weeks scanned to media tab.     Hemoglobin A1C   Date Value Ref Range Status   05/09/2025 5.3 % Final      Results       ** No results found for the last 336 hours. **          Lab Results   Component Value Date/Time     04/28/2025 09:22 AM    K 4.3 04/28/2025 09:22 AM     04/28/2025 09:22 AM    CO2 26 04/28/2025 09:22 AM    BUN 17 04/28/2025 09:22 AM    CREATININE 0.9 04/28/2025 09:22 AM    GLUCOSE 82 04/28/2025 09:22 AM    CALCIUM 9.3 04/28/2025 09:22 AM    LABGLOM > 60.0 04/28/2025 09:22 AM      Lab Results   Component Value Date    CHOL 179 04/28/2025    TRIG 73 04/28/2025    HDL 83 (H)

## 2025-06-10 ENCOUNTER — RESULTS FOLLOW-UP (OUTPATIENT)
Dept: FAMILY MEDICINE CLINIC | Age: 60
End: 2025-06-10

## 2025-06-13 ENCOUNTER — OFFICE VISIT (OUTPATIENT)
Dept: URGENT CARE | Facility: CLINIC | Age: 60
End: 2025-06-13
Payer: COMMERCIAL

## 2025-06-13 ENCOUNTER — APPOINTMENT (OUTPATIENT)
Dept: RADIOLOGY | Facility: CLINIC | Age: 60
End: 2025-06-13
Payer: COMMERCIAL

## 2025-06-13 VITALS
WEIGHT: 193.4 LBS | HEART RATE: 82 BPM | HEIGHT: 64 IN | RESPIRATION RATE: 20 BRPM | BODY MASS INDEX: 33.02 KG/M2 | DIASTOLIC BLOOD PRESSURE: 66 MMHG | SYSTOLIC BLOOD PRESSURE: 147 MMHG | TEMPERATURE: 97.5 F | OXYGEN SATURATION: 95 %

## 2025-06-13 DIAGNOSIS — M77.31 HEEL SPUR, RIGHT: Primary | ICD-10-CM

## 2025-06-13 DIAGNOSIS — M79.671 RIGHT FOOT PAIN: ICD-10-CM

## 2025-06-13 DIAGNOSIS — F33.9 RECURRENT MAJOR DEPRESSION RESISTANT TO TREATMENT: ICD-10-CM

## 2025-06-13 DIAGNOSIS — F41.8 DEPRESSION WITH ANXIETY: ICD-10-CM

## 2025-06-13 PROCEDURE — G0383 LEV 4 HOSP TYPE B ED VISIT: HCPCS

## 2025-06-13 PROCEDURE — S9083 URGENT CARE CENTER GLOBAL: HCPCS

## 2025-06-13 PROCEDURE — 73630 X-RAY EXAM OF FOOT: CPT

## 2025-06-13 NOTE — PROGRESS NOTES
[facility]        NAME: Leda Collado is a 59 y.o. female  : 1965    MRN: [id number]  DATE: 2025  TIME: 5:32 PM    Assessment and Plan   Heel spur, right [M77.31]  1. Heel spur, right  XR foot 3+ vw right        XR RIGHT Foot: No obvious abnormalities seen on preliminary XR reading. Heel spur noted. Official radiology report pending.      Patient Instructions   Use tylenol and/or ibuprofen for pain. You may also purchase 4% lidocaine patches over the counter for use at home (available at [employer], [employer], [employer], etc.). If you received any NSAID medications in the clinic (such as a toradol injection or ibuprofen) do not take any NSAID containing products (ibuprofen/advil/aleve) for the next 6 hours.     Rest, Elevation.    Ice for 20 minutes at a time, 3-4 times per day for 3 days.    Insulate the skin from the ice to prevent frostbite.    Follow up with podiatry.      Follow up with PCP in 3-5 days.  Proceed to  ER if symptoms worsen.    If tests have been performed at [facility], our office will contact you with results if changes need to be made to the care plan discussed with you at the visit.  You can review your full results on [facility].    Chief Complaint     Chief Complaint   Patient presents with   • Foot Pain     Right foot pain (Heel) intermittently since November. May have injured it on concrete.           History of Present Illness       7 mos  1st evaluation   No numbness/tingling   Intermittent   7/10 with ambulation - 0/10 at rest  Just returned from [place] with lots of walking  No pain meds   Ice - helped mildly   No swelling  No bruising  Smashed heel off of ground in October       Review of Systems   Review of Systems      Current Medications     Current Medications[1]    Current Allergies     Allergies as of 2025 - Reviewed 2025   Allergen Reaction Noted   • Molds & smuts  2019   • Sulfa antibiotics Rash 2019            The following  "portions of the patient's history were reviewed and updated as appropriate: allergies, current medications, past family history, past medical history, past social history, past surgical history and problem list.     Past Medical History[2]    Past Surgical History[3]    Family History[4]      Medications have been verified.        Objective   /66   Pulse 82   Temp 97.5 °F (36.4 °C)   Resp 20   Ht 5' 4\" (1.626 m)   Wt 87.7 kg (193 lb 6.4 oz)   SpO2 95%   BMI 33.20 kg/m²   No LMP recorded. (Menstrual status: Amenorrheic other).       Physical Exam     Physical Exam                     [1]    Current Outpatient Medications:   •  hydroxychloroquine (PLAQUENIL) 200 mg tablet, Take 200 mg by mouth in the morning and 200 mg before bedtime., Disp: , Rfl:   •  ibuprofen (MOTRIN) 600 mg tablet, Take 1 tablet (600 mg total) by mouth 3 (three) times a day as needed for moderate pain (pain), Disp: 30 tablet, Rfl: 0  •  levothyroxine 200 mcg tablet, Take 225 mcg by mouth, Disp: , Rfl:   •  levothyroxine 25 mcg tablet, , Disp: , Rfl:   •  montelukast (SINGULAIR) 10 mg tablet, Take by mouth, Disp: , Rfl:   •  albuterol (PROVENTIL HFA,VENTOLIN HFA) 90 mcg/act inhaler, , Disp: , Rfl:   •  amoxicillin (AMOXIL) 875 mg tablet, Take by mouth (Patient not taking: Reported on 6/13/2025), Disp: , Rfl:   •  amoxicillin-clavulanate (AUGMENTIN) 875-125 mg per tablet, Take by mouth (Patient not taking: Reported on 1/30/2023), Disp: , Rfl:   •  benzonatate (TESSALON PERLES) 100 mg capsule, , Disp: , Rfl:   •  butalbital-acetaminophen-caffeine (FIORICET,ESGIC) -40 mg per tablet, Take by mouth (Patient not taking: Reported on 6/13/2025), Disp: , Rfl:   •  butalbital-aspirin-caffeine (FIORINAL) -40 MG per tablet, Take by mouth (Patient not taking: Reported on 6/13/2025), Disp: , Rfl:   •  chlorthalidone 25 mg tablet, Take 25 mg by mouth (Patient not taking: Reported on 6/13/2025), Disp: , Rfl:   •  Cholecalciferol (VITAMIN " D3 PO), Take by mouth (Patient not taking: Reported on 6/13/2025), Disp: , Rfl:   •  doxycycline hyclate (VIBRAMYCIN) 50 mg capsule, Take 50 mg by mouth 2 (two) times a day (Patient not taking: Reported on 6/13/2025), Disp: , Rfl:   •  DULoxetine (CYMBALTA) 30 mg delayed release capsule, Take 1 capsule (30 mg total) by mouth daily (Patient not taking: Reported on 6/13/2025), Disp: 30 capsule, Rfl: 0  •  FLUoxetine (PROzac) 40 MG capsule, Take by mouth (Patient not taking: Reported on 6/13/2025), Disp: , Rfl:   •  fluticasone (FLONASE) 50 mcg/act nasal spray, into each nostril (Patient not taking: Reported on 6/13/2025), Disp: , Rfl:   •  HYDROCODONE-ACETAMINOPHEN PO, Take by mouth (Patient not taking: Reported on 6/13/2025), Disp: , Rfl:   •  levothyroxine 125 mcg tablet, Take by mouth (Patient not taking: Reported on 6/13/2025), Disp: , Rfl:   •  LEVOTHYROXINE SODIUM PO, Take by mouth (Patient not taking: Reported on 6/13/2025), Disp: , Rfl:   •  penicillin V potassium (VEETID) 500 mg tablet, Take by mouth (Patient not taking: Reported on 6/13/2025), Disp: , Rfl:   •  potassium chloride (K-DUR,KLOR-CON) 20 mEq tablet, Take by mouth (Patient not taking: Reported on 6/13/2025), Disp: , Rfl:   •  traMADol (ULTRAM) 50 mg tablet, Take 50 mg by mouth (Patient not taking: Reported on 6/13/2025), Disp: , Rfl:   •  triamterene-hydrochlorothiazide (MAXZIDE) 75-50 MG per tablet, Take by mouth (Patient not taking: Reported on 6/13/2025), Disp: , Rfl:   [2]  Past Medical History:  Diagnosis Date   • Hypertension    • Hypothyroid    [3]  Past Surgical History:  Procedure Laterality Date   • ANTERIOR CRUCIATE LIGAMENT REPAIR Left 2005   • BUNIONECTOMY Left    • ENDOMETRIAL ABLATION     • FOOT SURGERY Bilateral    • ROTATOR CUFF REPAIR Left    • TOTAL HIP ARTHROPLASTY Right 2023   [4]  No family history on file.   Feet:       Comments: RIGHT heel TTP. No bruising/swelling/abrasion/wound noted. Ambulating independently without apparent gait disturbance.      Skin:     General: Skin is warm and dry.      Capillary Refill: Capillary refill takes less than 2 seconds.      Coloration: Skin is not pale.      Findings: No bruising, erythema, lesion or rash.     Neurological:      General: No focal deficit present.      Mental Status: She is alert. Mental status is at baseline.      Motor: No weakness.      Gait: Gait normal.     Psychiatric:         Mood and Affect: Mood normal.         Behavior: Behavior normal.         Thought Content: Thought content normal.         Judgment: Judgment normal.                        [1]   Current Outpatient Medications:     hydroxychloroquine (PLAQUENIL) 200 mg tablet, Take 200 mg by mouth in the morning and 200 mg before bedtime., Disp: , Rfl:     ibuprofen (MOTRIN) 600 mg tablet, Take 1 tablet (600 mg total) by mouth 3 (three) times a day as needed for moderate pain (pain), Disp: 30 tablet, Rfl: 0    levothyroxine 200 mcg tablet, Take 225 mcg by mouth, Disp: , Rfl:     levothyroxine 25 mcg tablet, , Disp: , Rfl:     montelukast (SINGULAIR) 10 mg tablet, Take by mouth, Disp: , Rfl:     albuterol (PROVENTIL HFA,VENTOLIN HFA) 90 mcg/act inhaler, , Disp: , Rfl:     amoxicillin (AMOXIL) 875 mg tablet, Take by mouth (Patient not taking: Reported on 6/13/2025), Disp: , Rfl:     amoxicillin-clavulanate (AUGMENTIN) 875-125 mg per tablet, Take by mouth (Patient not taking: Reported on 1/30/2023), Disp: , Rfl:     benzonatate (TESSALON PERLES) 100 mg capsule, , Disp: , Rfl:     butalbital-acetaminophen-caffeine (FIORICET,ESGIC) -40 mg per tablet, Take by mouth (Patient not taking: Reported on 6/13/2025), Disp: , Rfl:     butalbital-aspirin-caffeine (FIORINAL) -40 MG per tablet, Take by mouth (Patient not taking: Reported on 6/13/2025), Disp: , Rfl:     chlorthalidone 25 mg tablet, Take 25 mg by  mouth (Patient not taking: Reported on 6/13/2025), Disp: , Rfl:     Cholecalciferol (VITAMIN D3 PO), Take by mouth (Patient not taking: Reported on 6/13/2025), Disp: , Rfl:     doxycycline hyclate (VIBRAMYCIN) 50 mg capsule, Take 50 mg by mouth 2 (two) times a day (Patient not taking: Reported on 6/13/2025), Disp: , Rfl:     DULoxetine (CYMBALTA) 30 mg delayed release capsule, Take 1 capsule (30 mg total) by mouth daily (Patient not taking: Reported on 6/13/2025), Disp: 30 capsule, Rfl: 0    FLUoxetine (PROzac) 40 MG capsule, Take by mouth (Patient not taking: Reported on 6/13/2025), Disp: , Rfl:     fluticasone (FLONASE) 50 mcg/act nasal spray, into each nostril (Patient not taking: Reported on 6/13/2025), Disp: , Rfl:     HYDROCODONE-ACETAMINOPHEN PO, Take by mouth (Patient not taking: Reported on 6/13/2025), Disp: , Rfl:     levothyroxine 125 mcg tablet, Take by mouth (Patient not taking: Reported on 6/13/2025), Disp: , Rfl:     LEVOTHYROXINE SODIUM PO, Take by mouth (Patient not taking: Reported on 6/13/2025), Disp: , Rfl:     penicillin V potassium (VEETID) 500 mg tablet, Take by mouth (Patient not taking: Reported on 6/13/2025), Disp: , Rfl:     potassium chloride (K-DUR,KLOR-CON) 20 mEq tablet, Take by mouth (Patient not taking: Reported on 6/13/2025), Disp: , Rfl:     traMADol (ULTRAM) 50 mg tablet, Take 50 mg by mouth (Patient not taking: Reported on 6/13/2025), Disp: , Rfl:     triamterene-hydrochlorothiazide (MAXZIDE) 75-50 MG per tablet, Take by mouth (Patient not taking: Reported on 6/13/2025), Disp: , Rfl:   [2]   Past Medical History:  Diagnosis Date    Hypertension     Hypothyroid    [3]   Past Surgical History:  Procedure Laterality Date    ANTERIOR CRUCIATE LIGAMENT REPAIR Left 2005    BUNIONECTOMY Left     ENDOMETRIAL ABLATION      FOOT SURGERY Bilateral     ROTATOR CUFF REPAIR Left     TOTAL HIP ARTHROPLASTY Right 2023   [4] No family history on file.

## 2025-06-13 NOTE — PATIENT INSTRUCTIONS
Use tylenol and/or ibuprofen for pain. You may also purchase 4% lidocaine patches over the counter for use at home (available at [employer], [employer], [employer], etc.). If you received any NSAID medications in the clinic (such as a toradol injection or ibuprofen) do not take any NSAID containing products (ibuprofen/advil/aleve) for the next 6 hours.     Rest, Elevation.    Ice for 20 minutes at a time, 3-4 times per day for 3 days.    Insulate the skin from the ice to prevent frostbite.    Follow up with podiatry.      Follow up with PCP in 3-5 days.  Proceed to  ER if symptoms worsen.    If tests have been performed at [facility], our office will contact you with results if changes need to be made to the care plan discussed with you at the visit.  You can review your full results on [facility] MyChart.

## 2025-07-01 ENCOUNTER — OFFICE VISIT (OUTPATIENT)
Dept: DIABETES SERVICES | Age: 60
End: 2025-07-01
Payer: COMMERCIAL

## 2025-07-01 VITALS
HEART RATE: 78 BPM | HEIGHT: 65 IN | WEIGHT: 191 LBS | DIASTOLIC BLOOD PRESSURE: 78 MMHG | OXYGEN SATURATION: 99 % | SYSTOLIC BLOOD PRESSURE: 110 MMHG | BODY MASS INDEX: 31.82 KG/M2

## 2025-07-01 DIAGNOSIS — E11.9 TYPE 2 DIABETES MELLITUS WITHOUT COMPLICATION, WITHOUT LONG-TERM CURRENT USE OF INSULIN (HCC): Primary | ICD-10-CM

## 2025-07-01 DIAGNOSIS — I10 PRIMARY HYPERTENSION: ICD-10-CM

## 2025-07-01 DIAGNOSIS — E78.2 MIXED HYPERLIPIDEMIA: ICD-10-CM

## 2025-07-01 PROCEDURE — 99214 OFFICE O/P EST MOD 30 MIN: CPT | Performed by: NURSE PRACTITIONER

## 2025-07-01 PROCEDURE — 3078F DIAST BP <80 MM HG: CPT | Performed by: NURSE PRACTITIONER

## 2025-07-01 PROCEDURE — 95251 CONT GLUC MNTR ANALYSIS I&R: CPT | Performed by: NURSE PRACTITIONER

## 2025-07-01 PROCEDURE — 3044F HG A1C LEVEL LT 7.0%: CPT | Performed by: NURSE PRACTITIONER

## 2025-07-01 PROCEDURE — 3074F SYST BP LT 130 MM HG: CPT | Performed by: NURSE PRACTITIONER

## 2025-07-01 PROCEDURE — G2211 COMPLEX E/M VISIT ADD ON: HCPCS | Performed by: NURSE PRACTITIONER

## 2025-07-01 ASSESSMENT — ENCOUNTER SYMPTOMS
SHORTNESS OF BREATH: 0
RESPIRATORY NEGATIVE: 1

## 2025-07-01 NOTE — PROGRESS NOTES
Patient Care Team:  Shelly Lopez MD as PCP - General (Family Medicine)  Shelly Lopez MD as PCP - Empaneled Provider  Lanny Braxton APRN - CNP as Diabetic Educator (Nurse Practitioner)    Past DM Medications   Metformin- hypoglycemia    glimepiride therapy completed  Janumet- therapy completed.      Current Diabetic Medications  Ozempic 2 mg once weekly         DKA episodes: 0    History of Present Illness  The patient presents for evaluation and management of diabetes mellitus.    The patient is currently utilizing the [employer] 3 continuous glucose monitoring system to track their blood glucose levels. They have been meticulous in documenting their dietary intake over the past month. Hypoglycemic episodes during physical activity have necessitated the consumption of pre-exercise meals, a practice they find undesirable. However, they report no recent occurrences of hypoglycemia following exercise. Their daily regimen includes early morning ambulation. On 06/25/2025, the functionality of their glucose sensor was compromised due to a bent needle. Additionally, the patient notes that their insurance does not cover the cost of the Kanika 3 system, although they find it advantageous for their diabetes management. The patient reports that ingestion of watermelon results in a marked elevation of their blood glucose levels. Similarly, consumption of ivette pudding with milk and blueberries also leads to significant hyperglycemia. The patient is currently prescribed Ozempic (semaglutide) 2 mg once weekly for glycemic control.      ICD-10-CM    1. Type 2 diabetes mellitus without complication, without long-term current use of insulin (HCC)  E11.9       2. Mixed hyperlipidemia  E78.2       3. Primary hypertension  I10           Assessment & Plan  1. Diabetes mellitus: Stable. Most recent A1c level, recorded in 05/2025, was 5.3%. Comprehensive cholesterol panel conducted in 04/2025 yielded satisfactory results.